# Patient Record
Sex: FEMALE | Race: WHITE | Employment: FULL TIME | URBAN - METROPOLITAN AREA
[De-identification: names, ages, dates, MRNs, and addresses within clinical notes are randomized per-mention and may not be internally consistent; named-entity substitution may affect disease eponyms.]

---

## 2017-01-17 ENCOUNTER — OFFICE VISIT (OUTPATIENT)
Dept: FAMILY MEDICINE CLINIC | Age: 52
End: 2017-01-17

## 2017-01-17 VITALS
BODY MASS INDEX: 34.36 KG/M2 | HEIGHT: 61 IN | DIASTOLIC BLOOD PRESSURE: 74 MMHG | OXYGEN SATURATION: 96 % | WEIGHT: 182 LBS | SYSTOLIC BLOOD PRESSURE: 121 MMHG | RESPIRATION RATE: 16 BRPM | TEMPERATURE: 96.9 F | HEART RATE: 72 BPM

## 2017-01-17 DIAGNOSIS — I10 ESSENTIAL HYPERTENSION: Primary | ICD-10-CM

## 2017-01-17 DIAGNOSIS — Z91.09 ENVIRONMENTAL ALLERGIES: ICD-10-CM

## 2017-01-17 DIAGNOSIS — R42 DIZZINESS: ICD-10-CM

## 2017-01-17 DIAGNOSIS — G89.29 CHRONIC MIDLINE LOW BACK PAIN WITHOUT SCIATICA: ICD-10-CM

## 2017-01-17 DIAGNOSIS — E55.9 HYPOVITAMINOSIS D: ICD-10-CM

## 2017-01-17 DIAGNOSIS — Z51.81 ENCOUNTER FOR MEDICATION MONITORING: ICD-10-CM

## 2017-01-17 DIAGNOSIS — G47.00 INSOMNIA, UNSPECIFIED TYPE: ICD-10-CM

## 2017-01-17 DIAGNOSIS — F32.A DEPRESSION, UNSPECIFIED DEPRESSION TYPE: ICD-10-CM

## 2017-01-17 DIAGNOSIS — M54.50 CHRONIC MIDLINE LOW BACK PAIN WITHOUT SCIATICA: ICD-10-CM

## 2017-01-17 RX ORDER — FAMOTIDINE 40 MG/1
TABLET, FILM COATED ORAL
Refills: 3 | COMMUNITY
Start: 2016-10-23 | End: 2017-01-17

## 2017-01-17 RX ORDER — PILOCARPINE HYDROCHLORIDE 5 MG/1
TABLET, FILM COATED ORAL
Refills: 2 | COMMUNITY
Start: 2016-11-04 | End: 2019-05-20

## 2017-01-17 RX ORDER — CELECOXIB 200 MG/1
CAPSULE ORAL
Refills: 4 | COMMUNITY
Start: 2016-11-04 | End: 2018-05-01 | Stop reason: SDUPTHER

## 2017-01-17 NOTE — MR AVS SNAPSHOT
Visit Information Date & Time Provider Department Dept. Phone Encounter #  
 1/17/2017  8:45 AM Marcheta Krabbe, MD [facility] [phone] [id number] Follow-up Instructions Return in about 3 months (around 4/17/2017). Upcoming Health Maintenance Date Due FOBT Q 1 YEAR AGE 50-75 6/12/2016 Pneumococcal 19-64 Highest Risk (2 of 3 - PCV13) 5/27/2017 PAP AKA CERVICAL CYTOLOGY 11/17/2018 DTaP/Tdap/Td series (2 - Td) 5/1/2023 Allergies as of 1/17/2017  Review Complete On: 1/17/2017 By: Tommie Madison LPN Severity Noted Reaction Type Reactions Other Food  10/18/2013    Other (comments) PT STATES OTHER FOOD ALLERGIES WITH VARYING REACTIONS:  PORK, LETTUCE, LIMA BEANS, BLACK PEPPER, TEA, MALT, WALNUTS. Beef Containing Products High 04/24/2012   Systemic Anaphylaxis Food Extracts High 04/10/2012    Anaphylaxis Beef, milk, peanuts Milk High 04/24/2012   Systemic Anaphylaxis Peanut High 04/24/2012   Systemic Anaphylaxis Alpha-d-galactosidase  11/13/2014    Diarrhea Azithromycin  07/19/2011   Side Effect Nausea and Vomiting Erythromycin  04/19/2010    Nausea and Vomiting Guaifenesin  06/22/2016    Nausea and Vomiting Morphine  10/25/2010    Nausea and Vomiting Projectile vomiting per patient Other Medication  10/03/2013    Other (comments) WHITE CELL STIMULATOR INJECTION (NEULASTIN?) CAUSED SEVERE BONE PAIN LASTING A COUPLE WEEKS. Current Immunizations  Reviewed on 1/17/2017 Name Date Influenza Vaccine 10/31/2016, 9/22/2015 Influenza Vaccine Split 10/20/2010 Tdap 5/1/2013 Reviewed by Tommie Madison LPN on 7/48/8126 at  9:52 AM  
 Reviewed by Marcheta Krabbe, MD on 1/17/2017 at 10:57 AM  
Vitals BP Pulse Temp Resp Height(growth percentile) Weight(growth percentile)  121/74 (BP 1 Location: Left arm, BP Patient Position: Sitting) 72 96.9 °F (36.1 °C) (Oral) 16 5' 1\" (1.549 m) 182 lb (82.6 kg) LMP SpO2 BMI OB Status Smoking Status 08/03/2012 96% 34.39 kg/m2 Hysterectomy Never Smoker Vitals History BMI and BSA Data Body Mass Index Body Surface Area  
 34.39 kg/m 2 1.89 m 2 Preferred Pharmacy Pharmacy Name Phone [id number]- [facility] [address] [phone] Your Updated Medication List  
  
   
This list is accurate as of: 1/17/17 11:10 AM.  Always use your most recent med list.  
  
  
  
  
 albuterol sulfate 90 mcg/actuation Aepb Commonly known as:  Margean Case Take 2 Puffs by inhalation every four (4) hours as needed. ALPRAZolam 0.25 mg tablet Commonly known as:  XANAX  
TAKE 1 OR 2 TABLETS THREE TIMES A DAY AS NEEDED  
  
 BENADRYL 25 mg capsule Generic drug:  diphenhydrAMINE Take 50 mg by mouth every six (6) hours as needed. Indications: ALLERGIC REACTIONS  
  
 celecoxib 200 mg capsule Commonly known as:  CELEBREX  
TAKE 1 CAPSULE TWICE A DAY AS NEEDED CLARITIN 10 mg tablet Generic drug:  loratadine Take 10 mg by mouth daily (after lunch). TAKES AS NEEDED  
  
 EPINEPHrine 0.3 mg/0.3 mL injection Commonly known as:  EPIPEN 2-JULIAN  
0.3 mL by IntraMUSCular route once as needed for up to 1 dose. esomeprazole 40 mg capsule Commonly known as:  NexIUM Take 1 Cap by mouth daily. gabapentin 300 mg capsule Commonly known as:  NEURONTIN  
TAKE ONE CAPSULE BY MOUTH EACH NIGHT HYDROcodone-acetaminophen 5-325 mg per tablet Commonly known as:  NORCO  
TAKE 1 TABLET EVERY 8 HOURS AS NEEDED FOR PAIN  
  
 metoprolol succinate 25 mg XL tablet Commonly known as:  TOPROL XL Take 0.5 Tabs by mouth daily. mometasone 50 mcg/actuation nasal spray Commonly known as:  NASONEX  
2 Sprays by Both Nostrils route as needed. pilocarpine 5 mg tablet Commonly known as:  Dickie Lee  
 Take 1 tab 2 times daily  
  
 promethazine-dextromethorphan 6.25-15 mg/5 mL syrup Commonly known as:  PROMETHAZINE-DM Take 5 mL by mouth every six (6) hours as needed for Cough. tamoxifen 20 mg tablet Commonly known as:  NOLVADEX Take 1 Tab by mouth nightly. traMADol 300 mg tablet Commonly known as:  ULTRAM-ER Take 1 Tab by mouth nightly. zolpidem CR 6.25 mg tablet Commonly known as:  AMBIEN CR  
TAKE 1 TABLET BY MOUTH IN THE EVENING AS NEEDED FOR SLEEP ZyrTEC 10 mg tablet Generic drug:  cetirizine Take 10 mg by mouth nightly. Follow-up Instructions Return in about 3 months (around 4/17/2017). Introducing [facility]! Dear Kay Allan: Thank you for requesting a [employer] account. Our records indicate that you already have an active [employer] account. You can access your account anytime at [email or web address] Did you know that you can access your hospital and ER discharge instructions at any time in [employer]? You can also review all of your test results from your hospital stay or ER visit. Additional Information If you have questions, please visit the Frequently Asked Questions section of the [employer] website at [email or web address] Remember, [employer] is NOT to be used for urgent needs. For medical emergencies, dial 911. Now available from your iPhone and Android! Please provide this summary of care documentation to your next provider. Your primary care clinician is listed as Sally Pollard. If you have any questions after today's visit, please call [phone].

## 2017-01-17 NOTE — PROGRESS NOTES
Pt here for follow up on hypertension. CMP 7/7/2016    Lipid 5/22/2015    Vit D 5/22/2015    A1C 5/21/2015    Pt has had a bilateral mastectomy 4/2012. Colonoscopy 9/14/2011 by Dr. Jack reagan in 10 years.

## 2017-01-17 NOTE — PROGRESS NOTES
HISTORY OF PRESENT ILLNESS  Asher Eckert is a 49 y.o. female. HPI   Follow up on chronic medical problems. C/o nasal congestion, headache and pressure in the face and cough for the past 2 weeks. Coughing up thick yellowish phlegm. No fever or chills noted. Has malaise. Throat is scratchy. Has post nasal drainage. No chest pain or SOB. No wheezing noted. Has trouble with swallowing over the past several weeks. Feels like food gets stuck in the throat. No nausea or abd pain. No vomiting noted. Cardiovascular Review:  The patient has hypertension. Diet and Lifestyle: generally follows a low fat low cholesterol diet, generally follows a low sodium diet, exercises sporadically  Home BP Monitoring: is not measured at home. Pertinent ROS: taking medications as instructed, no medication side effects noted, no TIA's, no chest pain on exertion, no dyspnea on exertion, no swelling of ankles. Anxiety Review:  Patient is seen for ADD, sleep disturbance and anxiety. Current treatment includes Xanax. She is currently not on the zoloft and vynase and tolerating being off of the medications. Ongoing symptoms include: insomnia. She is using [place] for sleep. Reported side effects from the treatment: none. Chronic Pain:  Patient has osteoarthritis and myalgias. She has been intolerant to NSAIDS and suffers with chronic pain. Symptoms onset: problem is longstanding. Rheumatological ROS: stable, mild-to-moderate joint symptoms intermittently, reasonably well controlled by PRN meds. Response to treatment plan: stable. {Choose one or more HPI Chronic Disease Notes, press DELETE if none desired:92012}  {Choose one or more SmartLinks; press DELETE if none desired:[id number]}   {Choose one or more Last Lab values; press DELETE if none desired:[id number]}     Review of Systems   Constitutional: Negative for malaise/fatigue. HENT: Negative for congestion. Eyes: Negative for blurred vision. Respiratory: Negative for cough and shortness of breath. Cardiovascular: Negative for chest pain, palpitations and leg swelling. Gastrointestinal: Negative for abdominal pain, constipation and heartburn. Genitourinary: Negative for dysuria, frequency and urgency. Musculoskeletal: Negative for back pain and joint pain. Neurological: Negative for dizziness, tingling and headaches. Endo/Heme/Allergies: Negative for environmental allergies. Psychiatric/Behavioral: Negative for depression. The patient does not have insomnia. Physical Exam   Constitutional: She appears well-developed and well-nourished. HENT:   Right Ear: Tympanic membrane and ear canal normal.   Left Ear: Tympanic membrane and ear canal normal.   Nose: No mucosal edema or rhinorrhea. Mouth/Throat: Oropharynx is clear and moist and mucous membranes are normal.   Neck: Normal range of motion. Neck supple. No thyromegaly present. Cardiovascular: Normal rate and regular rhythm. No murmur heard. Pulmonary/Chest: Effort normal and breath sounds normal.   Abdominal: Soft. Bowel sounds are normal. There is no tenderness. Musculoskeletal: Normal range of motion. She exhibits no edema. Lymphadenopathy:     She has no cervical adenopathy. Skin: Skin is warm and dry. Psychiatric: She has a normal mood and affect. Nursing note and vitals reviewed.       ASSESSMENT and PLAN  {ASSESSMENT/PLAN:81517}

## 2017-01-17 NOTE — PROGRESS NOTES
Pt was seen with student NP and I agree with note as outline. Assessment and plan discussed with the pt who understand her dx and treatment plan. I have discussed diagnosis listed in this note with pt and/or family. I have discussed treatment plans and options and the risk/benefit analysis of those options, including safe use of medications and possible medication side effects. Through the use of shared decision making we have agreed to the above plan. The patient has received an after-visit summary and questions were answered concerning future plans and follow up. Advise pt of any urgent changes then to proceed to the ER.

## 2017-01-17 NOTE — PROGRESS NOTES
HISTORY OF PRESENT ILLNESS  Corrinne Mitts is a 49 y.o. female. Here for HTN f/u. HPI   Follow up on chronic medical problems. Cardiovascular Review:  The patient has hypertension. Diet and Lifestyle: generally follows a low fat low cholesterol diet, generally follows a low sodium diet, exercises sporadically  Home BP Monitoring: is not measured at home. Taking medications as instructed, no medication side effects noted, no TIA's, no chest pain on exertion, no dyspnea on exertion, no swelling of ankles. Dizziness:   Pt has had dizziness in the past but it has been worse for the past 3 months which happens everyday. No associated weakness, or TIAs. Denies nasal congestion. Started pilocarpine and celebrex in November 2016. No CP, palpitations or SOB. Anxiety Review:  Patient is seen for ADD, sleep disturbance and anxiety. Current treatment includes Xanax. She is currently not on the zoloft and vynase and tolerating being off of the medications. Ongoing symptoms include: insomnia. She is using Trisha Lyme for sleep. Reported side effects from the treatment: none. She is looking for another job, current one is too stressful. 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 This has been discussed with her mental health specialist which she will see next Wednesday. Chronic Pain:  Patient has osteoarthritis and myalgias. She has been intolerant to NSAIDS and suffers with chronic pain. Symptoms onset: problem is longstanding. Rheumatological ROS: stable, mild-to-moderate joint symptoms intermittently, reasonably well controlled by PRN meds. Response to treatment plan: stable.      Patient Active Problem List   Diagnosis Code    Palpitations R00.2    Depression F32.9    Environmental allergies Z91.09    [facility] C[facility]    GERD (gastroesophageal reflux disease) K21.9    Insomnia G47.00    Essential hypertension I10    ADD (attention deficit disorder) F98.8    Encounter for medication monitoring Z51.81       Current Outpatient Prescriptions   Medication Sig Dispense Refill    celecoxib (CELEBREX) 200 mg capsule TAKE 1 CAPSULE TWICE A DAY AS NEEDED  4    pilocarpine (SALAGEN) 5 mg tablet Take 1 tab 2 times daily  2    zolpidem CR (AMBIEN CR) 6.25 mg tablet TAKE 1 TABLET BY MOUTH IN THE EVENING AS NEEDED FOR SLEEP 90 Tab 1    HYDROcodone-acetaminophen (NORCO) 5-325 mg per tablet TAKE 1 TABLET EVERY 8 HOURS AS NEEDED FOR PAIN 60 Tab 0    traMADol (ULTRAM-ER) 300 mg tablet Take 1 Tab by mouth nightly. 90 Tab 1    gabapentin (NEURONTIN) 300 mg capsule TAKE ONE CAPSULE BY MOUTH EACH NIGHT 90 Cap 0    ALPRAZolam (XANAX) 0.25 mg tablet TAKE 1 OR 2 TABLETS THREE TIMES A DAY AS NEEDED 90 Tab 1    metoprolol succinate (TOPROL XL) 25 mg XL tablet Take 0.5 Tabs by mouth daily. 45 Tab 3    promethazine-dextromethorphan (PROMETHAZINE-DM) 6.25-15 mg/5 mL syrup Take 5 mL by mouth every six (6) hours as needed for Cough. 180 mL 1    albuterol sulfate (PROAIR RESPICLICK) 90 mcg/actuation aepb Take 2 Puffs by inhalation every four (4) hours as needed. 1 Inhaler 0    esomeprazole (NEXIUM) 40 mg capsule Take 1 Cap by mouth daily. 30 Cap 3    tamoxifen (NOLVADEX) 20 mg tablet Take 1 Tab by mouth nightly. 90 Tab 3    mometasone (NASONEX) 50 mcg/actuation nasal spray 2 Sprays by Both Nostrils route as needed. 1 Container 11    EPINEPHrine (EPIPEN 2-JULIAN) 0.3 mg/0.3 mL (1:1,000) injection 0.3 mL by IntraMUSCular route once as needed for up to 1 dose. 0.6 mL 1    diphenhydrAMINE (BENADRYL) 25 mg capsule Take 50 mg by mouth every six (6) hours as needed. Indications: ALLERGIC REACTIONS      loratadine (CLARITIN) 10 mg tablet Take 10 mg by mouth daily (after lunch). TAKES AS NEEDED      cetirizine (ZYRTEC) 10 mg tablet Take 10 mg by mouth nightly.          Allergies   Allergen Reactions    Other Food Other (comments) PT STATES OTHER FOOD ALLERGIES WITH VARYING REACTIONS:  PORK, LETTUCE, LIMA BEANS, BLACK PEPPER, TEA, MALT, WALNUTS.  Beef Containing Products Anaphylaxis    Food Extracts Anaphylaxis     Beef, milk, peanuts    Milk Anaphylaxis    Peanut Anaphylaxis    Alpha-D-Galactosidase Diarrhea    Azithromycin Nausea and Vomiting    Erythromycin Nausea and Vomiting    Guaifenesin Nausea and Vomiting    Morphine Nausea and Vomiting     Projectile vomiting per patient    Other Medication Other (comments)     WHITE CELL STIMULATOR INJECTION (NEULASTIN?) CAUSED SEVERE BONE PAIN LASTING A COUPLE WEEKS. Past Medical History   Diagnosis Date    Anemia 10/3/13     \"SINCE I WAS A KID\"    Anxiety      DURING CHEMO; PT STAYS XANAX AS OF 10/3/13    [facility] [id number]     breast,chemo ended 10/2012    Chronic pain      si joints sacrum    Depression     Nausea & vomiting      used patch in past with good results    Other ill-defined conditions(799.89)      anemia,chronic diarrhea, TMJ    Ovarian cyst     Palpitations      TAKES METOPROLOL FOR TACHYCARDIA    PUD (peptic ulcer disease)      occurred at age 33 .     Unspecified adverse effect of anesthesia      TMJ       Past Surgical History   Procedure Laterality Date    Pr laminotomy  2/11/2010    Hx gi       anal fissure repair    Pr colonoscopy flx dx w/collj spec when pfrmd  09/14/2011     Dr Malcom Alcantara Pr breast surgery procedure unlisted       breast biopsy x3 left breast    Hx mastectomy  4/24/2012     double    Hx breast reconstruction  7/10/2013     BREAST NIPPLE RECONSTRUCTION REVISION performed by Pati Luna MD at [address] [place] [address] Hx breast reconstruction  5/22/2012     REINCISION LEFT BREAST FOR REMAINDER OF BREAST CA    Hx breast reconstruction  5/22/2012     R SEROMA EXCISED, REPLACE IMPLANT R BREAST    Hx vascular access  7/2/2012     PORTACATH INSERTION    Hx vascular access  11/2012     PORTCATH REMOVAL    Hx other surgical DENTAL SURGERIES WITH SEDATION    Hx  section  1984    Hx gyn  10/21/13     [facility]/BSO    Hx hysterectomy  10/2013     Dr. Jeffrey Warner       Family History   Problem Relation Age of Onset    Breast Cancer Mother 64    Cancer Mother 64      OF LUNG CA    Hypertension Father     Diabetes Father     Emphysema Father     COPD Father     Heart Attack Sister 50    Heart Disease Sister     Breast Cancer Sister     No Known Problems Brother     Anxiety Brother     No Known Problems Sister     Breast Cancer Paternal Grandmother     Malignant Hyperthermia Neg Hx     Pseudocholinesterase Deficiency Neg Hx     Delayed Awakening Neg Hx     Post-op Nausea/Vomiting Neg Hx     Emergence Delirium Neg Hx     Post-op Cognitive Dysfunction Neg Hx     Other Neg Hx        Social History   Substance Use Topics    Smoking status: Never Smoker    Smokeless tobacco: Never Used    Alcohol use No     Labs done external thru [facility]      Review of Systems   Constitutional: Negative for chills and fever. Eyes: Negative for blurred vision and double vision. Respiratory: Negative for cough. Cardiovascular: Negative for chest pain, palpitations and leg swelling. Gastrointestinal: Negative for abdominal pain, blood in stool and heartburn. Genitourinary: Negative for dysuria and hematuria. Musculoskeletal: Positive for back pain and myalgias. Chronic back pain   Neurological: Positive for dizziness. Negative for headaches. Psychiatric/Behavioral: The patient does not have insomnia. Physical Exam   Constitutional: She is oriented to person, place, and time. She appears well-developed and well-nourished. No distress. HENT:   Head: Normocephalic and atraumatic. Right Ear: External ear normal.   Left Ear: External ear normal.   Nose: Nose normal.   Mouth/Throat: Oropharynx is clear and moist.   Eyes: Pupils are equal, round, and reactive to light. Neck: Neck supple.  No tracheal deviation present. No thyromegaly present. Cardiovascular: Normal rate, regular rhythm, normal heart sounds and intact distal pulses. No murmur heard. Pulmonary/Chest: Effort normal and breath sounds normal. No respiratory distress. She has no wheezes. Abdominal: Soft. Bowel sounds are normal. She exhibits no distension. There is no tenderness. Musculoskeletal: She exhibits no edema. Lymphadenopathy:     She has no cervical adenopathy. Neurological: She is alert and oriented to person, place, and time. Skin: Skin is warm and dry. She is not diaphoretic. No erythema. Psychiatric: She has a normal mood and affect. Vitals reviewed. Visit Vitals    /74 (BP 1 Location: Left arm, BP Patient Position: Sitting)    Pulse 72    Temp 96.9 °F (36.1 °C) (Oral)    Resp 16    Ht 5' 1\" (1.549 m)    Wt 182 lb (82.6 kg)    LMP 08/03/2012    SpO2 96%    BMI 34.39 kg/m2       Current Outpatient Prescriptions:     celecoxib (CELEBREX) 200 mg capsule, TAKE 1 CAPSULE TWICE A DAY AS NEEDED, Disp: , Rfl: 4    pilocarpine (SALAGEN) 5 mg tablet, Take 1 tab 2 times daily, Disp: , Rfl: 2    zolpidem CR (AMBIEN CR) 6.25 mg tablet, TAKE 1 TABLET BY MOUTH IN THE EVENING AS NEEDED FOR SLEEP, Disp: 90 Tab, Rfl: 1    HYDROcodone-acetaminophen (NORCO) 5-325 mg per tablet, TAKE 1 TABLET EVERY 8 HOURS AS NEEDED FOR PAIN, Disp: 60 Tab, Rfl: 0    traMADol (ULTRAM-ER) 300 mg tablet, Take 1 Tab by mouth nightly., Disp: 90 Tab, Rfl: 1    gabapentin (NEURONTIN) 300 mg capsule, TAKE ONE CAPSULE BY MOUTH EACH NIGHT, Disp: 90 Cap, Rfl: 0    ALPRAZolam (XANAX) 0.25 mg tablet, TAKE 1 OR 2 TABLETS THREE TIMES A DAY AS NEEDED, Disp: 90 Tab, Rfl: 1    metoprolol succinate (TOPROL XL) 25 mg XL tablet, Take 0.5 Tabs by mouth daily. , Disp: 45 Tab, Rfl: 3    promethazine-dextromethorphan (PROMETHAZINE-DM) 6.25-15 mg/5 mL syrup, Take 5 mL by mouth every six (6) hours as needed for Cough. , Disp: 180 mL, Rfl: 1    albuterol sulfate (PROAIR RESPICLICK) 90 mcg/actuation aepb, Take 2 Puffs by inhalation every four (4) hours as needed. , Disp: 1 Inhaler, Rfl: 0    esomeprazole (NEXIUM) 40 mg capsule, Take 1 Cap by mouth daily. , Disp: 30 Cap, Rfl: 3    tamoxifen (NOLVADEX) 20 mg tablet, Take 1 Tab by mouth nightly., Disp: 90 Tab, Rfl: 3    mometasone (NASONEX) 50 mcg/actuation nasal spray, 2 Sprays by Both Nostrils route as needed. , Disp: 1 Container, Rfl: 11    EPINEPHrine (EPIPEN 2-JULIAN) 0.3 mg/0.3 mL (1:1,000) injection, 0.3 mL by IntraMUSCular route once as needed for up to 1 dose., Disp: 0.6 mL, Rfl: 1    diphenhydrAMINE (BENADRYL) 25 mg capsule, Take 50 mg by mouth every six (6) hours as needed. Indications: ALLERGIC REACTIONS, Disp: , Rfl:     loratadine (CLARITIN) 10 mg tablet, Take 10 mg by mouth daily (after lunch). TAKES AS NEEDED, Disp: , Rfl:     cetirizine (ZYRTEC) 10 mg tablet, Take 10 mg by mouth nightly., Disp: , Rfl:       ASSESSMENT and Sheila Slider was seen today for hypertension. Diagnoses and all orders for this visit:    Essential hypertension  Stable. Pt is compliant with medications and diet. Dizziness  -     CBC W/O DIFF; Future  -     METABOLIC PANEL, COMPREHENSIVE; Future  -     MRI BRAIN WO CONT; Future  Will hold medications to seen if this will help. Hold Ambien, xanax and tramadol. Hypovitaminosis D  -     VITAMIN D, 25 HYDROXY; Future    Insomnia, unspecified type  Stable    Depression, unspecified depression type  Stable    Chronic midline low back pain without sciatica  RA doctor prescribed celebrex in November 2016 and patient is responding well. Environmental allergies  Stable    Encounter for medication monitoring  Discussed medication side-effects and compliance. Follow-up Disposition:  Return in about 3 months (around 4/17/2017).   lab results and schedule of future lab studies reviewed with patient  reviewed diet, exercise and weight control  cardiovascular risk and specific lipid/LDL goals reviewed  reviewed medications and side effects in detail  radiology results and schedule of future radiology studies reviewed with patient    Pt was seen with student NP and I agree with note as outline. Assessment and plan discussed with the pt who understand her dx and treatment plan. I have discussed diagnosis listed in this note with pt and/or family. I have discussed treatment plans and options and the risk/benefit analysis of those options, including safe use of medications and possible medication side effects. Through the use of shared decision making we have agreed to the above plan. The patient has received an after-visit summary and questions were answered concerning future plans and follow up. Advise pt of any urgent changes then to proceed to the ER.

## 2017-01-23 ENCOUNTER — TELEPHONE (OUTPATIENT)
Dept: FAMILY MEDICINE CLINIC | Age: 52
End: 2017-01-23

## 2017-01-23 DIAGNOSIS — Z13.820 SCREENING FOR OSTEOPOROSIS: Primary | ICD-10-CM

## 2017-01-23 DIAGNOSIS — Z90.710 STATUS POST HYSTERECTOMY: ICD-10-CM

## 2017-01-23 NOTE — TELEPHONE ENCOUNTER
----- Message from Naomi Mota MD sent at 1/17/2017  6:15 PM EST -----  Please schedule her MRI of the head at an [facility] facility

## 2017-01-25 NOTE — TELEPHONE ENCOUNTER
(late entry) Appointment scheduled at [facility] on [address], Friday, January 27, 2017 at 7:45 am for MRI brain w/o contrast.     Patient is going to reschedule this appointment and asked that we place an order for a bone density scan as well. Order placed. Both orders faxed to [facility] central scheduling.

## 2017-02-27 DIAGNOSIS — K21.9 GASTROESOPHAGEAL REFLUX DISEASE WITHOUT ESOPHAGITIS: ICD-10-CM

## 2017-02-27 RX ORDER — ESOMEPRAZOLE MAGNESIUM 40 MG/1
40 CAPSULE, DELAYED RELEASE ORAL DAILY
Qty: 30 CAP | Refills: 11 | Status: SHIPPED | OUTPATIENT
Start: 2017-02-27 | End: 2018-03-23 | Stop reason: SDUPTHER

## 2017-03-01 DIAGNOSIS — F32.A DEPRESSION, UNSPECIFIED DEPRESSION TYPE: ICD-10-CM

## 2017-03-01 RX ORDER — ALPRAZOLAM 0.25 MG/1
TABLET ORAL
Qty: 90 TAB | Refills: 1 | OUTPATIENT
Start: 2017-03-01 | End: 2017-07-05 | Stop reason: SDUPTHER

## 2017-03-24 ENCOUNTER — NURSE NAVIGATOR (OUTPATIENT)
Dept: FAMILY MEDICINE CLINIC | Age: 52
End: 2017-03-24

## 2017-06-05 DIAGNOSIS — G89.4 CHRONIC PAIN SYNDROME: ICD-10-CM

## 2017-06-08 RX ORDER — TAMOXIFEN CITRATE 20 MG/1
20 TABLET ORAL
Qty: 90 TAB | Refills: 3 | OUTPATIENT
Start: 2017-06-08

## 2017-06-08 RX ORDER — HYDROCODONE BITARTRATE AND ACETAMINOPHEN 5; 325 MG/1; MG/1
TABLET ORAL
Qty: 60 TAB | Refills: 0 | OUTPATIENT
Start: 2017-06-08

## 2017-06-08 NOTE — TELEPHONE ENCOUNTER
From: Tash Cortez  To: Karie Nathan MD  Sent: 6/5/2017 2:28 PM EDT  Subject: Medication Renewal Request    Original authorizing provider: MD Tash Gonsalez would like a refill of the following medications:  tamoxifen (NOLVADEX) 20 mg tablet Karie Nathan MD]  HYDROcodone-acetaminophen (NORCO) 5-325 mg per tablet Karie Nathan MD]    Preferred pharmacy: [facility]/PHARMACY [address] [facility], [address] [place] [address] ROAD    Comment:

## 2017-06-09 ENCOUNTER — OFFICE VISIT (OUTPATIENT)
Dept: FAMILY MEDICINE CLINIC | Age: 52
End: 2017-06-09

## 2017-06-09 VITALS
HEART RATE: 74 BPM | BODY MASS INDEX: 35.87 KG/M2 | RESPIRATION RATE: 16 BRPM | DIASTOLIC BLOOD PRESSURE: 81 MMHG | SYSTOLIC BLOOD PRESSURE: 136 MMHG | HEIGHT: 61 IN | OXYGEN SATURATION: 97 % | TEMPERATURE: 97.2 F | WEIGHT: 190 LBS

## 2017-06-09 DIAGNOSIS — M51.36 DDD (DEGENERATIVE DISC DISEASE), LUMBAR: ICD-10-CM

## 2017-06-09 DIAGNOSIS — Z01.89 ENCOUNTER FOR ROUTINE LABORATORY TESTING: ICD-10-CM

## 2017-06-09 DIAGNOSIS — G89.29 ENCOUNTER FOR CHRONIC PAIN MANAGEMENT: ICD-10-CM

## 2017-06-09 DIAGNOSIS — G89.4 CHRONIC PAIN SYNDROME: Primary | ICD-10-CM

## 2017-06-09 RX ORDER — HYDROCODONE BITARTRATE AND ACETAMINOPHEN 5; 325 MG/1; MG/1
TABLET ORAL
Qty: 60 TAB | Refills: 0 | Status: CANCELLED | OUTPATIENT
Start: 2017-06-09

## 2017-06-09 RX ORDER — HYDROCODONE BITARTRATE AND ACETAMINOPHEN 5; 325 MG/1; MG/1
TABLET ORAL
Qty: 60 TAB | Refills: 0 | Status: SHIPPED | OUTPATIENT
Start: 2017-06-09 | End: 2018-01-03 | Stop reason: SDUPTHER

## 2017-06-09 RX ORDER — ZOLPIDEM TARTRATE 6.25 MG/1
TABLET, FILM COATED, EXTENDED RELEASE ORAL
Qty: 90 TAB | Refills: 1 | Status: SHIPPED | OUTPATIENT
Start: 2017-06-09 | End: 2017-07-05 | Stop reason: SDUPTHER

## 2017-06-09 NOTE — PROGRESS NOTES
HISTORY OF PRESENT ILLNESS  Urvashi Israel is a 49 y.o. female. HPI   Encounter for pain management. 1./2. Medical history/Past medical History  Chronic Pain:  Patient has osteoarthritis in the lower back and suffer with chronic pain in her lower back. Has been taking celebrex twice a day as long as she does not have GI upset. She uses the hydrocodone for more severe pain. Pain ranges from 6-9/10. MRI lumbar spine in 2009 showed DDD then with bulging disc and central radial teat in L4-5. She had laminectomy in 2010 for one disc. She has had multiple steroid epidural injections. Symptoms onset: problem is longstanding. Rheumatological ROS: stable, mild-to-moderate joint symptoms intermittently, reasonably well controlled by PRN meds. Response to treatment plan: stable. 3. Applicable records from prior treatment providers are apart of [facility] under the media tab. 4. Diagnostic, therapeutic and laboratory results are available in the [address] [place] [address] chart. 5. Consultation notes are available for review in the media tab of the [address] [place] [address] chart. 6. Treatment goals include pain control so that the pt may be as active and function with her daily activities and improved comfort level. Previously pt has been limited by pain. 7. The risks and benefits of treatment has been discussed at this office visit with the pt. She understands that the medication has addicting potential.  Additionally the pt has been advised that narcotic pain medication may impair mental and/or physical ability required for performance of tasks such as driving or operating any other machinery. 8. Pt has an updated signed pain contract on file and can be found under the FYI section of the [facility] chart. 9. The pain contract is reviewed. Pain medication will be continued at the previous dosage. Urine drug screening will be done today. Diagnostic studies are not indicated at this time.   Interventional procedure are not indicated at this time. 10. Medication prescibed is HYDROcodone-acetaminophen (NORCO) 5-325 mg per tablet .  has been reviewed at this OV by me. 11. Patient instructions have been reviewed in detail as outlined above and in the pain contract. 12. Re-eval is planned for 3 months. Patient Active Problem List   Diagnosis Code    Palpitations R00.2    Depression F32.9    Environmental allergies Z91.09    Breast cancer, stage 1 ([facility] [address] C50.919    GERD (gastroesophageal reflux disease) K21.9    Insomnia G47.00    Essential hypertension I10    ADD (attention deficit disorder) F98.8    Encounter for medication monitoring Z51.81       Current Outpatient Prescriptions   Medication Sig Dispense Refill    ALPRAZolam (XANAX) 0.25 mg tablet TAKE 1 OR 2 TABLETS THREE TIMES A DAY AS NEEDED 90 Tab 1    esomeprazole (NEXIUM) 40 mg capsule Take 1 Cap by mouth daily. 30 Cap 11    celecoxib (CELEBREX) 200 mg capsule TAKE 1 CAPSULE TWICE A DAY AS NEEDED  4    pilocarpine (SALAGEN) 5 mg tablet Take 1 tab 2 times daily  2    zolpidem CR (AMBIEN CR) 6.25 mg tablet TAKE 1 TABLET BY MOUTH IN THE EVENING AS NEEDED FOR SLEEP 90 Tab 1    HYDROcodone-acetaminophen (NORCO) 5-325 mg per tablet TAKE 1 TABLET EVERY 8 HOURS AS NEEDED FOR PAIN 60 Tab 0    traMADol (ULTRAM-ER) 300 mg tablet Take 1 Tab by mouth nightly. 90 Tab 1    gabapentin (NEURONTIN) 300 mg capsule TAKE ONE CAPSULE BY MOUTH EACH NIGHT 90 Cap 0    metoprolol succinate (TOPROL XL) 25 mg XL tablet Take 0.5 Tabs by mouth daily. 45 Tab 3    promethazine-dextromethorphan (PROMETHAZINE-DM) 6.25-15 mg/5 mL syrup Take 5 mL by mouth every six (6) hours as needed for Cough. 180 mL 1    albuterol sulfate (PROAIR RESPICLICK) 90 mcg/actuation aepb Take 2 Puffs by inhalation every four (4) hours as needed. 1 Inhaler 0    tamoxifen (NOLVADEX) 20 mg tablet Take 1 Tab by mouth nightly.  90 Tab 3    mometasone (NASONEX) 50 mcg/actuation nasal spray 2 Sprays by Both Nostrils route as needed. 1 Container 11    EPINEPHrine (EPIPEN 2-JULIAN) 0.3 mg/0.3 mL (1:1,000) injection 0.3 mL by IntraMUSCular route once as needed for up to 1 dose. 0.6 mL 1    diphenhydrAMINE (BENADRYL) 25 mg capsule Take 50 mg by mouth every six (6) hours as needed. Indications: ALLERGIC REACTIONS      loratadine (CLARITIN) 10 mg tablet Take 10 mg by mouth daily (after lunch). TAKES AS NEEDED      cetirizine (ZYRTEC) 10 mg tablet Take 10 mg by mouth nightly. Allergies   Allergen Reactions    Other Food Other (comments)     PT STATES OTHER FOOD ALLERGIES WITH VARYING REACTIONS:  PORK, LETTUCE, LIMA BEANS, BLACK PEPPER, TEA, MALT, WALNUTS.  Beef Containing Products Anaphylaxis    Food Extracts Anaphylaxis     Beef, milk, peanuts    Milk Anaphylaxis    Peanut Anaphylaxis    Alpha-D-Galactosidase Diarrhea    Azithromycin Nausea and Vomiting    Erythromycin Nausea and Vomiting    Guaifenesin Nausea and Vomiting    Morphine Nausea and Vomiting     Projectile vomiting per patient    Other Medication Other (comments)     WHITE CELL STIMULATOR INJECTION (NEULASTIN?) CAUSED SEVERE BONE PAIN LASTING A COUPLE WEEKS. Past Medical History:   Diagnosis Date    Anemia 10/3/13    \"SINCE I WAS A KID\"    Anxiety     DURING CHEMO; PT STAYS XANAX AS OF 10/3/13    Cancer ([facility] [id number]    breast,chemo ended 10/2012    Chronic pain     si joints sacrum    Depression     Nausea & vomiting     used patch in past with good results    Other ill-defined conditions     anemia,chronic diarrhea, TMJ    Ovarian cyst     Palpitations     TAKES METOPROLOL FOR TACHYCARDIA    PUD (peptic ulcer disease)     occurred at age 33 .     Unspecified adverse effect of anesthesia     TMJ       Past Surgical History:   Procedure Laterality Date    BREAST SURGERY PROCEDURE UNLISTED      breast biopsy x3 left breast    HX BREAST RECONSTRUCTION  7/10/2013    BREAST NIPPLE RECONSTRUCTION REVISION performed by Priyank Alexander MD at [facility] MA[facility] OR    HX BREAST RECONSTRUCTION  2012    REINCISION LEFT BREAST FOR REMAINDER OF BREAST CA    HX BREAST RECONSTRUCTION  2012    R SEROMA EXCISED, REPLACE IMPLANT R BREAST    HX  SECTION  1984    HX GI      anal fissure repair    HX GYN  10/21/13    TL[facility]/BSO    HX HYSTERECTOMY  10/2013    Dr. Yemi Reese    HX MASTECTOMY  2012    double    HX OTHER SURGICAL      DENTAL SURGERIES WITH SEDATION    HX VASCULAR ACCESS  2012    PORTACATH INSERTION    HX VASCULAR ACCESS  2012    PORTCATH REMOVAL    LAMINOTOMY  2010    [place] COLONOSCOPY FLX DX W/COLLJ SPEC WHEN PFRMD  2011    Dr Stefan Lagunas       Family History   Problem Relation Age of Onset    Breast Cancer Mother 64    Cancer Mother 64      OF LUNG CA    Hypertension Father     Diabetes Father     Emphysema Father     COPD Father     Heart Attack Sister 50    Heart Disease Sister     Breast Cancer Sister     No Known Problems Brother     Anxiety Brother     No Known Problems Sister     Breast Cancer Paternal Grandmother     Malignant Hyperthermia Neg Hx     Pseudocholinesterase Deficiency Neg Hx     Delayed Awakening Neg Hx     Post-op Nausea/Vomiting Neg Hx     Emergence Delirium Neg Hx     Post-op Cognitive Dysfunction Neg Hx     Other Neg Hx        Social History   Substance Use Topics    Smoking status: Never Smoker    Smokeless tobacco: Never Used    Alcohol use No        Review of Systems   Constitutional: Negative for malaise/fatigue. HENT: Negative for congestion. Eyes: Negative for blurred vision. Respiratory: Negative for cough and shortness of breath. Cardiovascular: Negative for chest pain, palpitations and leg swelling. Gastrointestinal: Negative for abdominal pain, constipation and heartburn. Genitourinary: Negative for dysuria, frequency and urgency. Musculoskeletal: Positive for back pain. Negative for joint pain. Neurological: Negative for dizziness, tingling and headaches. Endo/Heme/Allergies: Negative for environmental allergies. Psychiatric/Behavioral: Negative for depression. The patient does not have insomnia. Physical Exam   Constitutional: She appears well-developed and well-nourished. /81 (BP 1 Location: Left arm, BP Patient Position: Sitting)  Pulse 74  Temp 97.2 °F (36.2 °C) (Oral)   Resp 16  Ht 5' 1\" (1.549 m)  Wt 190 lb (86.2 kg)  LMP 08/03/2012  SpO2 97%  BMI 35.9 kg/m2     HENT:   Right Ear: Tympanic membrane and ear canal normal.   Left Ear: Tympanic membrane and ear canal normal.   Nose: No mucosal edema or rhinorrhea. Mouth/Throat: Oropharynx is clear and moist and mucous membranes are normal.   Neck: Normal range of motion. Neck supple. No thyromegaly present. Cardiovascular: Normal rate and regular rhythm. No murmur heard. Pulmonary/Chest: Effort normal and breath sounds normal.   Abdominal: Soft. Bowel sounds are normal. There is no tenderness. Musculoskeletal: Normal range of motion. She exhibits no edema. Lumbar back: She exhibits tenderness and bony tenderness. She exhibits normal range of motion. Lymphadenopathy:     She has no cervical adenopathy. Skin: Skin is warm and dry. Psychiatric: She has a normal mood and affect. Nursing note and vitals reviewed. ASSESSMENT and PLAN  Jackie Bassett was seen today for medication management. Diagnoses and all orders for this visit:    Chronic pain syndrome//  DDD (degenerative disc disease), lumbar  -     HYDROcodone-acetaminophen (NORCO) 5-325 mg per tablet; TAKE 1 TABLET EVERY 8 HOURS AS NEEDED FOR PAIN    Encounter for chronic pain management  -     9-DRUG SCREEN + OXY, UR  The pt has signed medication agreement. Pain contract is reviewed. Pain medications will be continued at the previous dosage. Urine drug screening will be done today. Diagnostic  studies are not indicated at this time. Interventional procedure are not indicated at this time. Re-eval in 3 months. Encounter for routine laboratory testing  -     CBC W/O DIFF; Future  -     LIPID PANEL; Future  -     METABOLIC PANEL, COMPREHENSIVE; Future  -     TSH 3RD GENERATION; Future  -     HEMOGLOBIN A1C WITH EAG; Future    Other orders  -     zolpidem CR (AMBIEN CR) 6.25 mg tablet; TAKE 1 TABLET BY MOUTH IN THE EVENING AS NEEDED FOR SLEEP      Follow-up Disposition:  Return in about 4 months (around 10/18/2017). I have discussed diagnosis listed in this note with pt and/or family. I have discussed treatment plans and options and the risk/benefit analysis of those options, including safe use of medications and possible medication side effects. Through the use of shared decision making we have agreed to the above plan. The patient has received an after-visit summary and questions were answered concerning future plans and follow up. Advise pt of any urgent changes then to proceed to the ER.

## 2017-06-10 LAB
AMPHETAMINES UR QL SCN: NEGATIVE NG/ML
BARBITURATES UR QL SCN: NEGATIVE NG/ML
BENZODIAZ UR QL SCN: POSITIVE NG/ML
BZE UR QL SCN: NEGATIVE NG/ML
CANNABINOIDS UR QL SCN: NEGATIVE NG/ML
CREAT UR-MCNC: 99.8 MG/DL (ref 20–300)
METHADONE UR QL SCN: NEGATIVE NG/ML
OPIATES UR QL SCN: NEGATIVE NG/ML
OXYCODONE+OXYMORPHONE UR QL SCN: NEGATIVE NG/ML
PCP UR QL SCN: NEGATIVE NG/ML
PH UR: 5.7 [PH] (ref 4.5–8.9)
PLEASE NOTE:, 733163: NORMAL
PROPOXYPH UR QL SCN: NEGATIVE NG/ML

## 2017-07-05 DIAGNOSIS — F32.A DEPRESSION, UNSPECIFIED DEPRESSION TYPE: ICD-10-CM

## 2017-07-05 RX ORDER — ZOLPIDEM TARTRATE 6.25 MG/1
TABLET, FILM COATED, EXTENDED RELEASE ORAL
Qty: 90 TAB | Refills: 0 | OUTPATIENT
Start: 2017-07-05 | End: 2017-10-04 | Stop reason: SDUPTHER

## 2017-07-05 RX ORDER — ALPRAZOLAM 0.25 MG/1
TABLET ORAL
Qty: 90 TAB | Refills: 0 | OUTPATIENT
Start: 2017-07-05 | End: 2017-08-23 | Stop reason: SDUPTHER

## 2017-07-05 NOTE — TELEPHONE ENCOUNTER
From: Carla Link  To: Liudmila Brewer MD  Sent: 7/5/2017 1:26 AM EDT  Subject: Medication Renewal Request    Original authorizing provider: MD Alfreda Mena would like a refill of the following medications:  ALPRAZolam (XANAX) 0.25 mg tablet [Trupti Kirk MD]  zolpidem CR (AMBIEN CR) 6.25 mg tablet Liudmila Brewer MD]    Preferred pharmacy: [facility], [address] [place] [address] ROAD    Comment:

## 2017-07-12 ENCOUNTER — TELEPHONE (OUTPATIENT)
Dept: FAMILY MEDICINE CLINIC | Age: 52
End: 2017-07-12

## 2017-09-01 DIAGNOSIS — J40 BRONCHITIS: ICD-10-CM

## 2017-09-01 RX ORDER — SULFAMETHOXAZOLE AND TRIMETHOPRIM 800; 160 MG/1; MG/1
1 TABLET ORAL 2 TIMES DAILY
Qty: 20 TAB | Refills: 0 | Status: SHIPPED | OUTPATIENT
Start: 2017-09-01 | End: 2017-09-11

## 2017-09-01 RX ORDER — PROMETHAZINE HYDROCHLORIDE AND DEXTROMETHORPHAN HYDROBROMIDE 6.25; 15 MG/5ML; MG/5ML
5 SYRUP ORAL
Qty: 180 ML | Refills: 1 | Status: SHIPPED | OUTPATIENT
Start: 2017-09-01 | End: 2018-01-03

## 2017-10-02 RX ORDER — METOPROLOL SUCCINATE 25 MG/1
12.5 TABLET, EXTENDED RELEASE ORAL DAILY
Qty: 45 TAB | Refills: 1 | Status: SHIPPED | OUTPATIENT
Start: 2017-10-02 | End: 2018-04-19 | Stop reason: SDUPTHER

## 2017-10-05 ENCOUNTER — DOCUMENTATION ONLY (OUTPATIENT)
Dept: FAMILY MEDICINE CLINIC | Age: 52
End: 2017-10-05

## 2017-10-05 RX ORDER — ZOLPIDEM TARTRATE 6.25 MG/1
TABLET, FILM COATED, EXTENDED RELEASE ORAL
Qty: 90 TAB | Refills: 0 | OUTPATIENT
Start: 2017-10-05

## 2017-10-05 RX ORDER — ZOLPIDEM TARTRATE 6.25 MG/1
TABLET, FILM COATED, EXTENDED RELEASE ORAL
Qty: 90 TAB | Refills: 0 | OUTPATIENT
Start: 2017-10-05 | End: 2017-10-06 | Stop reason: SDUPTHER

## 2017-10-05 NOTE — TELEPHONE ENCOUNTER
From: Venu Puga  To: Brent Rucker MD  Sent: 10/4/2017 11:43 PM EDT  Subject: Medication Renewal Request    Original authorizing provider: MD Mike Mitchell would like a refill of the following medications:  zolpidem CR (AMBIEN CR) 6.25 mg tablet Brent Rucker MD]    Preferred pharmacy: [facility], [address] [facility] [address]    Comment:  I will be out of Medicine tomorrow please fill as soon as possible. Thank you.

## 2017-10-06 RX ORDER — ZOLPIDEM TARTRATE 6.25 MG/1
TABLET, FILM COATED, EXTENDED RELEASE ORAL
Qty: 90 TAB | Refills: 1 | OUTPATIENT
Start: 2017-10-06 | End: 2018-01-02 | Stop reason: SDUPTHER

## 2017-12-04 RX ORDER — GABAPENTIN 300 MG/1
CAPSULE ORAL
Qty: 90 CAP | Refills: 0 | Status: SHIPPED | OUTPATIENT
Start: 2017-12-04 | End: 2018-03-14 | Stop reason: SDUPTHER

## 2017-12-04 NOTE — TELEPHONE ENCOUNTER
Requested Prescriptions     Pending Prescriptions Disp Refills    gabapentin (NEURONTIN) 300 mg capsule 90 Cap 0     Sig: TAKE ONE CAPSULE BY MOUTH EACH NIGHT     LOV:6/9/17  NOV: not schedule

## 2017-12-06 DIAGNOSIS — F32.A DEPRESSION, UNSPECIFIED DEPRESSION TYPE: ICD-10-CM

## 2017-12-06 RX ORDER — ALPRAZOLAM 0.25 MG/1
TABLET ORAL
Qty: 90 TAB | Refills: 1 | OUTPATIENT
Start: 2017-12-06 | End: 2018-04-13

## 2017-12-06 NOTE — TELEPHONE ENCOUNTER
Requested Prescriptions     Pending Prescriptions Disp Refills    ALPRAZolam (XANAX) 0.25 mg tablet 90 Tab 1     Sig: TAKE 1 OR 2 TABLETS THREE TIMES A DAY AS NEEDED     LOV:6/9/17  NOV: Not schedule

## 2017-12-21 RX ORDER — TAMOXIFEN CITRATE 20 MG/1
20 TABLET ORAL
Qty: 90 TAB | Refills: 3 | OUTPATIENT
Start: 2017-12-21

## 2018-01-02 DIAGNOSIS — Z91.09 ENVIRONMENTAL ALLERGIES: ICD-10-CM

## 2018-01-02 RX ORDER — MOMETASONE FUROATE 50 UG/1
2 SPRAY, METERED NASAL AS NEEDED
Qty: 1 CONTAINER | Refills: 11 | Status: SHIPPED | OUTPATIENT
Start: 2018-01-02 | End: 2018-01-03 | Stop reason: SDUPTHER

## 2018-01-02 NOTE — TELEPHONE ENCOUNTER
Requested Prescriptions     Pending Prescriptions Disp Refills    mometasone (NASONEX) 50 mcg/actuation nasal spray 1 Container 11     Si Sprays by Both Nostrils route as needed.

## 2018-01-03 ENCOUNTER — OFFICE VISIT (OUTPATIENT)
Dept: FAMILY MEDICINE CLINIC | Age: 53
End: 2018-01-03

## 2018-01-03 VITALS
SYSTOLIC BLOOD PRESSURE: 116 MMHG | TEMPERATURE: 98.4 F | HEIGHT: 61 IN | RESPIRATION RATE: 18 BRPM | WEIGHT: 194.4 LBS | HEART RATE: 88 BPM | BODY MASS INDEX: 36.7 KG/M2 | DIASTOLIC BLOOD PRESSURE: 87 MMHG | OXYGEN SATURATION: 96 %

## 2018-01-03 DIAGNOSIS — Z01.89 ENCOUNTER FOR PAIN MANAGEMENT PLANNING: ICD-10-CM

## 2018-01-03 DIAGNOSIS — Z51.81 ENCOUNTER FOR MEDICATION MONITORING: ICD-10-CM

## 2018-01-03 DIAGNOSIS — R42 DIZZINESS: ICD-10-CM

## 2018-01-03 DIAGNOSIS — R00.2 PALPITATIONS: ICD-10-CM

## 2018-01-03 DIAGNOSIS — E55.9 HYPOVITAMINOSIS D: ICD-10-CM

## 2018-01-03 DIAGNOSIS — I10 ESSENTIAL HYPERTENSION: Primary | ICD-10-CM

## 2018-01-03 DIAGNOSIS — E66.9 NON MORBID OBESITY: ICD-10-CM

## 2018-01-03 DIAGNOSIS — Z91.09 ENVIRONMENTAL ALLERGIES: ICD-10-CM

## 2018-01-03 DIAGNOSIS — M47.816 LUMBAR ARTHROPATHY: ICD-10-CM

## 2018-01-03 RX ORDER — HYDROCODONE BITARTRATE AND ACETAMINOPHEN 5; 325 MG/1; MG/1
TABLET ORAL
Qty: 60 TAB | Refills: 0 | Status: SHIPPED | OUTPATIENT
Start: 2018-01-03 | End: 2018-04-13 | Stop reason: SDUPTHER

## 2018-01-03 RX ORDER — MOMETASONE FUROATE 50 UG/1
2 SPRAY, METERED NASAL AS NEEDED
Qty: 1 CONTAINER | Refills: 11 | Status: SHIPPED | OUTPATIENT
Start: 2018-01-03 | End: 2020-05-28 | Stop reason: SDUPTHER

## 2018-01-03 NOTE — PROGRESS NOTES
Chief Complaint   Patient presents with    Insomnia     follow up     1. Have you been to the ER, urgent care clinic since your last visit? Hospitalized since your last visit? No    2. Have you seen or consulted any other health care providers outside of the [address] [place] [address] since your last visit? Include any pap smears or colon screening.  No

## 2018-01-03 NOTE — MR AVS SNAPSHOT
Visit Information Date & Time Provider Department Dept. Phone Encounter #  
 1/3/2018  3:00 PM Sourav Sanchez MD [facility] [phone] [id number] Follow-up Instructions Return in about 3 months (around 4/16/2018). Upcoming Health Maintenance Date Due FOBT Q 1 YEAR AGE 50-75 6/12/2016 Pneumococcal 19-64 Highest Risk (2 of 3 - PCV13) 5/27/2017 PAP AKA CERVICAL CYTOLOGY 11/17/2018 DTaP/Tdap/Td series (2 - Td) 5/1/2023 Allergies as of 1/3/2018  Review Complete On: 1/3/2018 By: Saba Kemp LPN Severity Noted Reaction Type Reactions Other Food  10/18/2013    Other (comments) PT STATES OTHER FOOD ALLERGIES WITH VARYING REACTIONS:  PORK, LETTUCE, LIMA BEANS, BLACK PEPPER, TEA, MALT, WALNUTS. Beef Containing Products High 04/24/2012   Systemic Anaphylaxis Food Extracts High 04/10/2012    Anaphylaxis Beef, milk, peanuts Milk High 04/24/2012   Systemic Anaphylaxis Peanut High 04/24/2012   Systemic Anaphylaxis Alpha-d-galactosidase  11/13/2014    Diarrhea Azithromycin  07/19/2011   Side Effect Nausea and Vomiting Erythromycin  04/19/2010    Nausea and Vomiting Guaifenesin  06/22/2016    Nausea and Vomiting Morphine  10/25/2010    Nausea and Vomiting Projectile vomiting per patient Other Medication  10/03/2013    Other (comments) WHITE CELL STIMULATOR INJECTION (NEULASTIN?) CAUSED SEVERE BONE PAIN LASTING A COUPLE WEEKS. Current Immunizations  Reviewed on 1/3/2018 Name Date Influenza Vaccine 9/10/2017, 10/31/2016, 9/22/2015 Influenza Vaccine Split 10/20/2010 Tdap 5/1/2013 Reviewed by Sourav Sanchez MD on 1/3/2018 at  4:32 PM  
You Were Diagnosed With   
  
 Codes Comments Dizziness    -  Primary ICD-10-CM: E30 ICD-9-CM: 780. 4 Chronic pain syndrome     ICD-10-CM: G89.4 ICD-9-CM: 338.4 Essential hypertension     ICD-10-CM: I10 
ICD-9-CM: 401.9 Encounter for medication monitoring     ICD-10-CM: Z51.81 
ICD-9-CM: V58.83 Palpitations     ICD-10-CM: R00.2 ICD-9-CM: 785.1 Encounter for pain management planning     ICD-10-CM: Z01.89 ICD-9-CM: V72.85 Hypovitaminosis D     ICD-10-CM: E55.9 ICD-9-CM: 268.9 Environmental allergies     ICD-10-CM: Z91.09 
ICD-9-CM: V15.09 Vitals BP Pulse Temp Resp Height(growth percentile) Weight(growth percentile) 116/87 (BP 1 Location: Right arm, BP Patient Position: Sitting) 88 98.4 °F (36.9 °C) (Oral) 18 5' 1\" (1.549 m) 194 lb 6.4 oz (88.2 kg) LMP SpO2 PF BMI OB Status Smoking Status 08/03/2012 96% 450 L/min 36.73 kg/m2 Hysterectomy Never Smoker Vitals History BMI and BSA Data Body Mass Index Body Surface Area  
 36.73 kg/m 2 1.95 m 2 Preferred Pharmacy Pharmacy Name Phone [id number], [place] [address] [phone] Your Updated Medication List  
  
   
This list is accurate as of: 1/3/18  5:02 PM.  Always use your most recent med list.  
  
  
  
  
 albuterol sulfate 90 mcg/actuation Aepb Commonly known as:  Adán Resides Take 2 Puffs by inhalation every four (4) hours as needed. ALPRAZolam 0.25 mg tablet Commonly known as:  XANAX  
TAKE 1 OR 2 TABLETS THREE TIMES A DAY AS NEEDED  
  
 BENADRYL 25 mg capsule Generic drug:  diphenhydrAMINE Take 50 mg by mouth every six (6) hours as needed. Indications: ALLERGIC REACTIONS  
  
 celecoxib 200 mg capsule Commonly known as:  CELEBREX  
TAKE 1 CAPSULE TWICE A DAY AS NEEDED CLARITIN 10 mg tablet Generic drug:  loratadine Take 10 mg by mouth daily (after lunch). TAKES AS NEEDED  
  
 EPINEPHrine 0.3 mg/0.3 mL injection Commonly known as:  EPIPEN 2-JULIAN  
0.3 mL by IntraMUSCular route once as needed for up to 1 dose. esomeprazole 40 mg capsule Commonly known as:  NexIUM Take 1 Cap by mouth daily. gabapentin 300 mg capsule Commonly known as:  NEURONTIN  
TAKE ONE CAPSULE BY MOUTH EACH NIGHT HYDROcodone-acetaminophen 5-325 mg per tablet Commonly known as:  NORCO  
TAKE 1 TABLET EVERY 8 HOURS AS NEEDED FOR PAIN  
  
 metoprolol succinate 25 mg XL tablet Commonly known as:  TOPROL XL Take 0.5 Tabs by mouth daily. mometasone 50 mcg/actuation nasal spray Commonly known as:  NASONEX  
2 Sprays by Both Nostrils route as needed. pilocarpine 5 mg tablet Commonly known as:  Merryl Whitney Take 1 tab 2 times daily  
  
 tamoxifen 20 mg tablet Commonly known as:  NOLVADEX Take 1 Tab by mouth nightly. zolpidem CR 6.25 mg tablet Commonly known as:  AMBIEN CR  
TAKE 1 TABLET BY MOUTH IN THE EVENING AS NEEDED FOR SLEEP ZyrTEC 10 mg tablet Generic drug:  cetirizine Take 10 mg by mouth nightly. Prescriptions Printed Refills HYDROcodone-acetaminophen (NORCO) 5-325 mg per tablet 0 Sig: TAKE 1 TABLET EVERY 8 HOURS AS NEEDED FOR PAIN Class: Print We Performed the Following 9-DRUG SCREEN + OXY, UR [id number] CPT(R)] METABOLIC PANEL, BASIC [01290 CPT(R)] REFERRAL TO CARDIOLOGY [JKC34 Custom] Comments:  
 cardiologist  
 TSH 3RD GENERATION [98603 CPT(R)] VITAMIN D, 25 HYDROXY [id number] CPT(R)] Follow-up Instructions Return in about 3 months (around 4/16/2018). Referral Information Referral ID Referred By Referred To  
  
 [id number] Daniele MOULTON Not Available Visits Status Start Date End Date 1 New Request 1/3/18 1/3/19 If your referral has a status of pending review or denied, additional information will be sent to support the outcome of this decision. Introducing [facility]! Dear Sterling Pink: Thank you for requesting a [employer] account. Our records indicate that you already have an active [employer] account. You can access your account anytime at [email or web address] Did you know that you can access your hospital and ER discharge instructions at any time in [employer] You can also review all of your test results from your hospital stay or ER visit. Additional Information If you have questions, please visit the Frequently Asked Questions section of the [employer] website at [email or web address] Remember, [employer] is NOT to be used for urgent needs. For medical emergencies, dial 911. Now available from your iPhone and Android! Please provide this summary of care documentation to your next provider. Your primary care clinician is listed as Marek Cruz. If you have any questions after today's visit, please call [phone].

## 2018-01-03 NOTE — PROGRESS NOTES
HISTORY OF PRESENT ILLNESS  Shai Burger is a 47 y.o. female. HPI   Follow up on chronic medical problems. Cardiovascular Review:  The patient has hypertension. Diet and Lifestyle: generally follows a low fat low cholesterol diet, generally follows a low sodium diet, exercises sporadically  Home BP Monitoring: is not measured at home. Pertinent ROS: taking medications as instructed, no medication side effects noted, no TIA's, no chest pain on exertion, no dyspnea on exertion, no swelling of ankles. She has been having period of palpitations and dizziness over the past several months. Feels like her heart is racing. Breaks out into a sweats at times. No chest pain or SOB noted. Sx occurs about 1 to 2 times in a weeks and may last for several minutes. Anxiety Review:  Patient is seen for ADD, sleep disturbance and anxiety. Current treatment includes Xanax. Ongoing symptoms include: insomnia. She is using [place] for sleep. Reported side effects from the treatment: none. Encounter for pain management. 1./2. Medical history/Past medical History  Chronic Pain:  Patient has osteoarthritis in the lower back and suffer with chronic pain in her lower back. Has been taking celebrex twice a day as long as she does not have GI upset. She uses the hydrocodone for more severe pain. Pain ranges from 6-9/10. MRI lumbar spine in 2009 showed DDD then with bulging disc and central radial teat in L4-5. She had laminectomy in 2010 for one disc. She has had multiple steroid epidural injections. Symptoms onset: problem is longstanding. Rheumatological ROS: stable, mild-to-moderate joint symptoms intermittently, reasonably well controlled by PRN meds. Response to treatment plan: stable. 3. Applicable records from prior treatment providers are apart of [facility] under the media tab. 4. Diagnostic, therapeutic and laboratory results are available in the [address] [place] [address] chart.     5. Consultation notes are available for review in the media tab of the [facility] chart. 6. Treatment goals include pain control so that the pt may be as active and function with her daily activities and improved comfort level. Previously pt has been limited by pain. 7. The risks and benefits of treatment has been discussed at this office visit with the pt. She understands that the medication has addicting potential.  Additionally the pt has been advised that narcotic pain medication may impair mental and/or physical ability required for performance of tasks such as driving or operating any other machinery. 8. Pt has an updated signed pain contract on file and can be found under the FYI section of the [facility] chart. 9. The pain contract is reviewed. Pain medication will be continued at the previous dosage. Urine drug screening will be done today. Diagnostic studies are not indicated at this time. Interventional procedure are not indicated at this time. 10. Medication prescibed is HYDROcodone-acetaminophen (NORCO) 5-325 mg per tablet .  has been reviewed at this OV by me. 11. Patient instructions have been reviewed in detail as outlined above and in the pain contract. 12. Re-eval is planned for 3 months. Patient Active Problem List   Diagnosis Code    Palpitations R00.2    Depression F32.9    Environmental allergies Z91.09    Breast cancer, stage 1 ([facility] [address] C50.919    GERD (gastroesophageal reflux disease) K21.9    Insomnia G47.00    Essential hypertension I10    ADD (attention deficit disorder) F98.8    Encounter for medication monitoring Z51.81       Current Outpatient Prescriptions   Medication Sig Dispense Refill    HYDROcodone-acetaminophen (NORCO) 5-325 mg per tablet TAKE 1 TABLET EVERY 8 HOURS AS NEEDED FOR PAIN 60 Tab 0    mometasone (NASONEX) 50 mcg/actuation nasal spray 2 Sprays by Both Nostrils route as needed.  1 Container 11    zolpidem CR (AMBIEN CR) 6.25 mg tablet TAKE 1 TABLET BY MOUTH IN THE EVENING AS NEEDED FOR SLEEP 90 Tab 1    ALPRAZolam (XANAX) 0.25 mg tablet TAKE 1 OR 2 TABLETS THREE TIMES A DAY AS NEEDED 90 Tab 1    gabapentin (NEURONTIN) 300 mg capsule TAKE ONE CAPSULE BY MOUTH EACH NIGHT 90 Cap 0    metoprolol succinate (TOPROL XL) 25 mg XL tablet Take 0.5 Tabs by mouth daily. 45 Tab 1    albuterol sulfate (PROAIR RESPICLICK) 90 mcg/actuation aepb Take 2 Puffs by inhalation every four (4) hours as needed. 1 Inhaler 0    esomeprazole (NEXIUM) 40 mg capsule Take 1 Cap by mouth daily. 30 Cap 11    celecoxib (CELEBREX) 200 mg capsule TAKE 1 CAPSULE TWICE A DAY AS NEEDED  4    pilocarpine (SALAGEN) 5 mg tablet Take 1 tab 2 times daily  2    tamoxifen (NOLVADEX) 20 mg tablet Take 1 Tab by mouth nightly. 90 Tab 3    EPINEPHrine (EPIPEN 2-JULIAN) 0.3 mg/0.3 mL (1:1,000) injection 0.3 mL by IntraMUSCular route once as needed for up to 1 dose. 0.6 mL 1    diphenhydrAMINE (BENADRYL) 25 mg capsule Take 50 mg by mouth every six (6) hours as needed. Indications: ALLERGIC REACTIONS      loratadine (CLARITIN) 10 mg tablet Take 10 mg by mouth daily (after lunch). TAKES AS NEEDED      cetirizine (ZYRTEC) 10 mg tablet Take 10 mg by mouth nightly. Allergies   Allergen Reactions    Other Food Other (comments)     PT STATES OTHER FOOD ALLERGIES WITH VARYING REACTIONS:  PORK, LETTUCE, LIMA BEANS, BLACK PEPPER, TEA, MALT, WALNUTS.  Beef Containing Products Anaphylaxis    Food Extracts Anaphylaxis     Beef, milk, peanuts    Milk Anaphylaxis    Peanut Anaphylaxis    Alpha-D-Galactosidase Diarrhea    Azithromycin Nausea and Vomiting    Erythromycin Nausea and Vomiting    Guaifenesin Nausea and Vomiting    Morphine Nausea and Vomiting     Projectile vomiting per patient    Other Medication Other (comments)     WHITE CELL STIMULATOR INJECTION (NEULASTIN?) CAUSED SEVERE BONE PAIN LASTING A COUPLE WEEKS.          Past Medical History:   Diagnosis Date    Anemia 10/3/13 \"SINCE I WAS A KID\"    Anxiety     DURING CHEMO; PT STAYS XANAX AS OF 10/3/13    Cancer ([facility] [address] [id number]    breast,chemo ended 10/2012    Chronic pain     si joints sacrum    Depression     Nausea & vomiting     used patch in past with good results    Other ill-defined conditions(799.89)     anemia,chronic diarrhea, TMJ    Ovarian cyst     Palpitations     TAKES METOPROLOL FOR TACHYCARDIA    PUD (peptic ulcer disease)     occurred at age 33 .     Unspecified adverse effect of anesthesia     TMJ         Past Surgical History:   Procedure Laterality Date    BREAST SURGERY PROCEDURE UNLISTED      breast biopsy x3 left breast    HX BREAST RECONSTRUCTION  7/10/2013    BREAST NIPPLE RECONSTRUCTION REVISION performed by Marena Seip, MD at [facility] MAIN OR    HX BREAST RECONSTRUCTION  2012    REINCISION LEFT BREAST FOR REMAINDER OF BREAST CA    HX BREAST RECONSTRUCTION  2012    R SEROMA EXCISED, REPLACE IMPLANT R BREAST    HX  SECTION      HX GI      anal fissure repair    HX GYN  10/21/13    [facility]/BSO    HX HYSTERECTOMY  10/2013    Dr. Dora Graham    HX MASTECTOMY  2012    double    HX OTHER SURGICAL      DENTAL SURGERIES WITH SEDATION    HX VASCULAR ACCESS  2012    PORTACATH INSERTION    HX VASCULAR ACCESS  2012    PORTCATH REMOVAL    LAMINOTOMY  2010    OR COLONOSCOPY FLX DX W/COLLJ SPEC WHEN PFRMD  2011    Dr Pam Baker         Family History   Problem Relation Age of Onset    Breast Cancer Mother 64    Cancer Mother 64      OF LUNG CA    Hypertension Father     Diabetes Father     Emphysema Father     COPD Father     Heart Attack Sister 50    Heart Disease Sister     Breast Cancer Sister     No Known Problems Brother     Anxiety Brother     No Known Problems Sister     Breast Cancer Paternal Grandmother     Malignant Hyperthermia Neg Hx     Pseudocholinesterase Deficiency Neg Hx     Delayed Awakening Neg Hx     Post-op Nausea/Vomiting Neg Hx     Emergence Delirium Neg Hx     Post-op Cognitive Dysfunction Neg Hx     Other Neg Hx        Social History   Substance Use Topics    Smoking status: Never Smoker    Smokeless tobacco: Never Used    Alcohol use No        Review of Systems   Constitutional: Negative for malaise/fatigue. HENT: Negative for congestion. Eyes: Negative for blurred vision. Respiratory: Negative for cough and shortness of breath. Cardiovascular: Negative for chest pain, palpitations and leg swelling. Gastrointestinal: Negative for abdominal pain, constipation and heartburn. Genitourinary: Negative for dysuria, frequency and urgency. Musculoskeletal: Positive for back pain. Negative for joint pain. Neurological: Negative for dizziness, tingling and headaches. Endo/Heme/Allergies: Negative for environmental allergies. Psychiatric/Behavioral: Negative for depression. The patient does not have insomnia. Physical Exam   Constitutional: She appears well-developed and well-nourished. /87 (BP 1 Location: Right arm, BP Patient Position: Sitting)  Pulse 88  Temp 98.4 °F (36.9 °C) (Oral)   Resp 18  Ht 5' 1\" (1.549 m)  Wt 194 lb 6.4 oz (88.2 kg)  LMP 08/03/2012  SpO2 96%   L/min  BMI 36.73 kg/m2     HENT:   Right Ear: Tympanic membrane and ear canal normal.   Left Ear: Tympanic membrane and ear canal normal.   Nose: No mucosal edema or rhinorrhea. Mouth/Throat: Oropharynx is clear and moist and mucous membranes are normal.   Neck: Normal range of motion. Neck supple. No thyromegaly present. Cardiovascular: Normal rate and regular rhythm. No murmur heard. Pulmonary/Chest: Effort normal and breath sounds normal.   Abdominal: Soft. Bowel sounds are normal. There is no tenderness. Musculoskeletal: Normal range of motion. She exhibits no edema. Lumbar back: She exhibits tenderness and bony tenderness. She exhibits normal range of motion.    Lymphadenopathy:     She has no cervical adenopathy. Skin: Skin is warm and dry. Small blackhead lesion on the upper left arm. Psychiatric: She has a normal mood and affect. Nursing note and vitals reviewed. ASSESSMENT and PLAN  Diagnoses and all orders for this visit:    1. Essential hypertension  Stable     2. Dizziness  3. Palpitations  -     REFERRAL TO CARDIOLOGY  -     TSH 3RD GENERATION    4. Lumbar arthropathy (HCC)  -     HYDROcodone-acetaminophen (NORCO) 5-325 mg per tablet; TAKE 1 TABLET EVERY 8 HOURS AS NEEDED FOR PAIN    5. Encounter for pain management planning  -     9-DRUG SCREEN + OXY, UR  -     METABOLIC PANEL, BASIC  The pt has signed medication agreement. Pain contract is reviewed. Pain medications will be continued at the previous dosage. Urine drug screening will be done today. Diagnostic  studies are not indicated at this time. Interventional procedure are not indicated at this time. Re-eval in 3 months. 6. Hypovitaminosis D  -     VITAMIN D, 25 HYDROXY    7. Environmental allergies  -     mometasone (NASONEX) 50 mcg/actuation nasal spray; 2 Sprays by Both Nostrils route as needed. 8. Encounter for medication monitoring    9. Non morbid obesity  Discussed weight loss options, diet and exercise. Also reviewed health issues related to obesity. Initial goal of weight loss 10% of current weight. Counseled on goal for exercise of eventual goal of 30-60 minutes 5-7 times a week as per AHA guidelines. Decrease carbohydrates (white foods, sweet foods, sweet drinks and alcohol), increase green leafy vegetables and protein (lean meats and beans). Avoid fried foods. Increase water intake. Eat 3-5 small meals daily. Increase physical activity. Follow-up Disposition:  Return in about 3 months (around 4/16/2018).   reviewed diet, exercise and weight control  cardiovascular risk and specific lipid/LDL goals reviewed  reviewed medications and side effects in detail    I have discussed diagnosis listed in this note with pt and/or family. I have discussed treatment plans and options and the risk/benefit analysis of those options, including safe use of medications and possible medication side effects. Through the use of shared decision making we have agreed to the above plan. The patient has received an after-visit summary and questions were answered concerning future plans and follow up. Advise pt of any urgent changes then to proceed to the ER.

## 2018-01-04 LAB
25(OH)D3+25(OH)D2 SERPL-MCNC: 22.7 NG/ML (ref 30–100)
AMPHETAMINES UR QL SCN: NEGATIVE NG/ML
BARBITURATES UR QL SCN: NEGATIVE NG/ML
BENZODIAZ UR QL SCN: NEGATIVE NG/ML
BUN SERPL-MCNC: 10 MG/DL (ref 6–24)
BUN/CREAT SERPL: 14 (ref 9–23)
BZE UR QL SCN: NEGATIVE NG/ML
CALCIUM SERPL-MCNC: 9.2 MG/DL (ref 8.7–10.2)
CANNABINOIDS UR QL SCN: NEGATIVE NG/ML
CHLORIDE SERPL-SCNC: 101 MMOL/L (ref 96–106)
CO2 SERPL-SCNC: 25 MMOL/L (ref 18–29)
CREAT SERPL-MCNC: 0.72 MG/DL (ref 0.57–1)
CREAT UR-MCNC: 38.8 MG/DL (ref 20–300)
GLUCOSE SERPL-MCNC: 93 MG/DL (ref 65–99)
METHADONE UR QL SCN: NEGATIVE NG/ML
OPIATES UR QL SCN: NEGATIVE NG/ML
OXYCODONE+OXYMORPHONE UR QL SCN: NEGATIVE NG/ML
PCP UR QL SCN: NEGATIVE NG/ML
PH UR: 6.2 [PH] (ref 4.5–8.9)
PLEASE NOTE:, 733163: NORMAL
POTASSIUM SERPL-SCNC: 4 MMOL/L (ref 3.5–5.2)
PROPOXYPH UR QL SCN: NEGATIVE NG/ML
SODIUM SERPL-SCNC: 141 MMOL/L (ref 134–144)
TSH SERPL DL<=0.005 MIU/L-ACNC: 2.33 UIU/ML (ref 0.45–4.5)

## 2018-01-05 RX ORDER — ERGOCALCIFEROL 1.25 MG/1
50000 CAPSULE ORAL
Qty: 5 CAP | Refills: 6 | Status: SHIPPED | OUTPATIENT
Start: 2018-01-05 | End: 2018-09-07 | Stop reason: SDUPTHER

## 2018-01-24 ENCOUNTER — OFFICE VISIT (OUTPATIENT)
Dept: CARDIOLOGY CLINIC | Age: 53
End: 2018-01-24

## 2018-01-24 VITALS
DIASTOLIC BLOOD PRESSURE: 74 MMHG | OXYGEN SATURATION: 98 % | HEIGHT: 61 IN | HEART RATE: 84 BPM | WEIGHT: 194.2 LBS | BODY MASS INDEX: 36.67 KG/M2 | SYSTOLIC BLOOD PRESSURE: 116 MMHG

## 2018-01-24 DIAGNOSIS — R00.2 PALPITATIONS: ICD-10-CM

## 2018-01-24 DIAGNOSIS — I10 ESSENTIAL HYPERTENSION: Primary | ICD-10-CM

## 2018-01-24 DIAGNOSIS — R55 NEAR SYNCOPE: ICD-10-CM

## 2018-01-24 DIAGNOSIS — R06.09 DOE (DYSPNEA ON EXERTION): ICD-10-CM

## 2018-01-24 NOTE — PROGRESS NOTES
Lorenzo England DNP, ANP-BC  Subjective/HPI: Kailey Grubbs is a 47 y.o. female is here for new patient consultation for episodes of intermittent dizziness with near syncopal feelings, palpitations and tachycardia without exertional activities. She states she has been seen by cardiology before with normal echo, stress test and monitoring performed approximately 5 years ago. She reports being on metoprolol for tachycardia and hypertension. Cardiac risk factors include  Obesity, hypertension, early family history of heart disease    PCP Provider  Marek Cruz MD  Past Medical History:   Diagnosis Date    Anemia 10/3/13    \"SINCE I WAS A KID\"    Anxiety     DURING CHEMO; PT STAYS XANAX AS OF 10/3/13    [facility] ([facility] [phone]    breast,chemo ended 10/2012    Chronic pain     si joints sacrum    Depression     Nausea & vomiting     used patch in past with good results    Other ill-defined conditions(799.89)     anemia,chronic diarrhea, TMJ    Ovarian cyst     Palpitations     TAKES METOPROLOL FOR TACHYCARDIA    PUD (peptic ulcer disease)     occurred at age 33 .     Unspecified adverse effect of anesthesia     TMJ      Past Surgical History:   Procedure Laterality Date    BREAST SURGERY PROCEDURE UNLISTED      breast biopsy x3 left breast    HX BREAST RECONSTRUCTION  7/10/2013    BREAST NIPPLE RECONSTRUCTION REVISION performed by Maribel Weinstein MD at [address] HX BREAST RECONSTRUCTION  2012    REINCISION LEFT BREAST FOR REMAINDER OF BREAST CA    HX BREAST RECONSTRUCTION  2012    R SEROMA EXCISED, REPLACE IMPLANT R BREAST    HX  SECTION  1984    HX GI      anal fissure repair    HX GYN  10/21/13    [facility]/BSO    HX HYSTERECTOMY  10/2013    Dr. Nahomi King    HX MASTECTOMY  2012    double    HX OTHER SURGICAL      DENTAL SURGERIES WITH SEDATION    HX VASCULAR ACCESS  2012    PORTACATH INSERTION    HX VASCULAR ACCESS  2012    [facility] REMOVAL    LAMINOTOMY  2010    OK COLONOSCOPY FLX DX W/COLLJ SPEC WHEN PFRMD  2011    Dr Esthela Jacobson Other (comments)     PT STATES OTHER FOOD ALLERGIES WITH VARYING REACTIONS:  PORK, LETTUCE, LIMA BEANS, BLACK PEPPER, TEA, MALT, WALNUTS.  Beef Containing Products Anaphylaxis    Food Extracts Anaphylaxis     Beef, milk, peanuts    Milk Anaphylaxis    Peanut Anaphylaxis    Alpha-D-Galactosidase Diarrhea    Azithromycin Nausea and Vomiting    Erythromycin Nausea and Vomiting    Guaifenesin Nausea and Vomiting    Morphine Nausea and Vomiting     Projectile vomiting per patient    Other Medication Other (comments)     WHITE CELL STIMULATOR INJECTION (NEULASTIN?) CAUSED SEVERE BONE PAIN LASTING A COUPLE WEEKS. Family History   Problem Relation Age of Onset    Breast Cancer Mother 64    Cancer Mother 64      OF LUNG CA    Hypertension Father     Diabetes Father     Emphysema Father     COPD Father     Heart Attack Sister 50    Heart Disease Sister     Breast Cancer Sister     No Known Problems Brother     Anxiety Brother     No Known Problems Sister     Breast Cancer Paternal Grandmother     Malignant Hyperthermia Neg Hx     Pseudocholinesterase Deficiency Neg Hx     Delayed Awakening Neg Hx     Post-op Nausea/Vomiting Neg Hx     Emergence Delirium Neg Hx     Post-op Cognitive Dysfunction Neg Hx     Other Neg Hx       Current Outpatient Prescriptions   Medication Sig    ergocalciferol (VITAMIN D2) 50,000 unit capsule Take 1 Cap by mouth every seven (7) days.  HYDROcodone-acetaminophen (NORCO) 5-325 mg per tablet TAKE 1 TABLET EVERY 8 HOURS AS NEEDED FOR PAIN    mometasone (NASONEX) 50 mcg/actuation nasal spray 2 Sprays by Both Nostrils route as needed.     zolpidem CR (AMBIEN CR) 6.25 mg tablet TAKE 1 TABLET BY MOUTH IN THE EVENING AS NEEDED FOR SLEEP    ALPRAZolam (XANAX) 0.25 mg tablet TAKE 1 OR 2 TABLETS THREE TIMES A DAY AS NEEDED    metoprolol succinate (TOPROL XL) 25 mg XL tablet Take 0.5 Tabs by mouth daily.  albuterol sulfate (PROAIR RESPICLICK) 90 mcg/actuation aepb Take 2 Puffs by inhalation every four (4) hours as needed.  esomeprazole (NEXIUM) 40 mg capsule Take 1 Cap by mouth daily.  celecoxib (CELEBREX) 200 mg capsule TAKE 1 CAPSULE TWICE A DAY AS NEEDED    pilocarpine (SALAGEN) 5 mg tablet Take 1 tab 2 times daily    tamoxifen (NOLVADEX) 20 mg tablet Take 1 Tab by mouth nightly.  EPINEPHrine (EPIPEN 2-JULIAN) 0.3 mg/0.3 mL (1:1,000) injection 0.3 mL by IntraMUSCular route once as needed for up to 1 dose.  diphenhydrAMINE (BENADRYL) 25 mg capsule Take 50 mg by mouth every six (6) hours as needed. Indications: ALLERGIC REACTIONS    loratadine (CLARITIN) 10 mg tablet Take 10 mg by mouth daily (after lunch). TAKES AS NEEDED    cetirizine (ZYRTEC) 10 mg tablet Take 10 mg by mouth nightly.  gabapentin (NEURONTIN) 300 mg capsule TAKE ONE CAPSULE BY MOUTH EACH NIGHT     No current facility-administered medications for this visit. Vitals:    01/24/18 0933 01/24/18 0940 01/24/18 0942   BP: 136/78 130/76 116/74   Pulse: 84     SpO2: 98%     Weight: 194 lb 3.2 oz (88.1 kg)     Height: 5' 1\" (1.549 m)       Social History     Social History    Marital status: SINGLE     Spouse name: N/A    Number of children: N/A    Years of education: N/A     Occupational History    Not on file. Social History Main Topics    Smoking status: Never Smoker    Smokeless tobacco: Never Used    Alcohol use No    Drug use: No    Sexual activity: Yes     Partners: Male     Other Topics Concern    Not on file     Social History Narrative       I have reviewed the nurses notes, vitals, problem list, allergy list, medical history, family, social history and medications. Review of Symptoms:    General: Pt denies excessive weight gain or loss.  Pt is able to conduct ADL's  HEENT: Denies blurred vision, headaches, epistaxis and difficulty swallowing. Respiratory: Denies shortness of breath, + HUYNH, denies wheezing or stridor. Cardiovascular: Denies precordial pain, + palpitations, edema or PND  Gastrointestinal: Denies poor appetite, indigestion, abdominal pain or blood in stool  Musculoskeletal: Denies pain or swelling from muscles or joints  Neurologic: Denies tremor, paresthesias, or sensory motor disturbance  Skin: Denies rash, itching or texture change. Physical Exam:      General: Well developed, in no acute distress, cooperative and alert  HEENT: No carotid bruits, no JVD, trach is midline. Neck Supple, PEERL, EOM intact. Heart:  Normal S1/S2 negative S3 or S4. Regular, no murmur, gallop or rub.   Respiratory: Clear bilaterally x 4, no wheezing or rales  Abdomen:   Soft, non-tender, no masses, bowel sounds are active.   Extremities:  No edema, normal cap refill, no cyanosis, atraumatic. Neuro: A&Ox3, speech clear, gait stable. Skin: Skin color is normal. No rashes or lesions.  Non diaphoretic  Vascular: 2+ pulses symmetric in all extremities    Cardiographics    ECG: Normal sinus rhythm  Results for orders placed or performed during the hospital encounter of 06/26/13   EKG, 12 LEAD, INITIAL   Result Value Ref Range    Ventricular Rate 70 BPM    Atrial Rate 70 BPM    P-R Interval 154 ms    QRS Duration 112 ms    Q-T Interval 412 ms    QTC Calculation (Bezet) 444 ms    Calculated P Axis 50 degrees    Calculated R Axis 31 degrees    Calculated T Axis 55 degrees    Diagnosis       Normal sinus rhythm  Normal ECG  When compared with ECG of 15-GORDON-2013 08:40,  No significant change was found  Confirmed by Chelle Balderrama M.D., Susan Tavarez ([id number]) on 6/26/2013 4:46:15 PM   Results for orders placed or performed in visit on 07/19/11   [facility] POC EKG ROUTINE W/ 12 LEADS, INTER & REP    Impression    wnl         Cardiology Labs:  Lab Results   Component Value Date/Time    Cholesterol, total 196 08/27/2014 09:01 AM    HDL Cholesterol 51 08/27/2014 09:01 AM    LDL, calculated 118 08/27/2014 09:01 AM    Triglyceride 136 08/27/2014 09:01 AM    CHOL/HDL Ratio 3.6 12/15/2009 02:10 PM       Lab Results   Component Value Date/Time    Sodium 141 01/03/2018 05:01 PM    Potassium 4.0 01/03/2018 05:01 PM    Chloride 101 01/03/2018 05:01 PM    CO2 25 01/03/2018 05:01 PM    Anion gap 6 10/22/2013 04:40 AM    Glucose 93 01/03/2018 05:01 PM    BUN 10 01/03/2018 05:01 PM    Creatinine 0.72 01/03/2018 05:01 PM    BUN/Creatinine ratio 14 01/03/2018 05:01 PM    GFR est  01/03/2018 05:01 PM    GFR est non-AA 97 01/03/2018 05:01 PM    Calcium 9.2 01/03/2018 05:01 PM    Bilirubin, total 0.3 08/27/2014 09:01 AM    AST (SGOT) 15 08/27/2014 09:01 AM    Alk. phosphatase 77 08/27/2014 09:01 AM    Protein, total 6.6 08/27/2014 09:01 AM    Albumin 4.3 08/27/2014 09:01 AM    Globulin 3.2 10/03/2013 04:29 PM    A-G Ratio 1.9 08/27/2014 09:01 AM    ALT (SGPT) 20 08/27/2014 09:01 AM           Assessment:     Assessment:     Diagnoses and all orders for this visit:    1. Essential hypertension  -     AMB POC EKG ROUTINE W/ 12 LEADS, INTER & REP  -     2D ECHO COMPLETE ADULT (TTE) W OR WO CONTR; Future  -     LEXISCAN/CARDIOLITE, Clinic Performed; Future  -     LOOP MONITOR, Clinic Performed; Future    2. Palpitations  -     AMB POC EKG ROUTINE W/ 12 LEADS, INTER & REP  -     2D ECHO COMPLETE ADULT (TTE) W OR WO CONTR; Future  -     LEXISCAN/CARDIOLITE, Clinic Performed; Future  -     LOOP MONITOR, Clinic Performed; Future    3. HUYNH (dyspnea on exertion)  -     2D ECHO COMPLETE ADULT (TTE) W OR WO CONTR; Future  -     LEXISCAN/CARDIOLITE, Clinic Performed; Future  -     LOOP MONITOR, Clinic Performed; Future    4. Near syncope  -     2D ECHO COMPLETE ADULT (TTE) W OR WO CONTR; Future  -     LEXISCAN/CARDIOLITE, Clinic Performed; Future  -     LOOP MONITOR, Clinic Performed; Future        ICD-10-CM ICD-9-CM    1.  Essential hypertension I10 401.9 AMB POC EKG ROUTINE W/ 12 LEADS, INTER & REP      2D ECHO COMPLETE ADULT (TTE) W OR WO CONTR      STRESS TEST LEXISCAN/CARDIOLITE      LOOP MONITOR   2. Palpitations R00.2 785.1 AMB POC EKG ROUTINE W/ 12 LEADS, INTER & REP      2D ECHO COMPLETE ADULT (TTE) W OR WO CONTR      STRESS TEST LEXISCAN/CARDIOLITE      LOOP MONITOR   3. HUYNH (dyspnea on exertion) R06.09 786.09 2D ECHO COMPLETE ADULT (TTE) W OR WO CONTR      STRESS TEST LEXISCAN/CARDIOLITE      LOOP MONITOR   4. Near syncope R55 780.2 2D ECHO COMPLETE ADULT (TTE) W OR WO CONTR      STRESS TEST LEXISCAN/CARDIOLITE      LOOP MONITOR     Orders Placed This Encounter    LOOP MONITOR, Clinic Performed     Standing Status:   Future     Standing Expiration Date:   7/24/2018     Order Specific Question:   Reason for Exam:     Answer:   non daily episodes of tachycardiac w/ dizziness    AMB POC EKG ROUTINE W/ 12 LEADS, INTER & REP     Order Specific Question:   Reason for Exam:     Answer:   ROUTINE    LEXISCAN/CARDIOLITE, Clinic Performed     Standing Status:   Future     Standing Expiration Date:   7/24/2018     Order Specific Question:   Reason for Exam:     Answer:   HUYNH, fatigue and strong [address] of CAD    2D ECHO COMPLETE ADULT (TTE) W OR WO CONTR     Standing Status:   Future     Standing Expiration Date:   7/24/2018     Order Specific Question:   Reason for Exam:     Answer:   HUYNH, hx of breast CA with chemotherapy. Order Specific Question:   Contrast Enhancement (Bubble Study, Definity, Optison) may be used if criteria listed in established evidence-based protocol has been identified. Answer:   Yes        Plan:     Patient is a 27-year-old female with recurrent episodes of tachycardia at times associated with dizziness palpitations. Additionally, has progressive dyspnea on exertion and fatigue. Cardiac risk factors include strong family history of early CAD, hypertension, obesity and mildly elevated LDL.   Will evaluate with Lexiscan nuclear stress test as patient reports she will be unable to sufficiently walk on the treadmill, I do not want her beta-blocker  held for stress test either. Echocardiogram for cardiac structure and history of chemotherapy use, no radiation therapy. Event monitor to evaluate for arrhythmia. Reviewed labs from primary care showing normal metabolic panel and thyroid function. Follow-up when testing complete    Lorenzo England NP    This note was created using voice recognition software. Despite editing, there may be syntax errors. [facility]    1/24/2018         Patient seen, examined by me personally. Plan discussed as detailed. Agree with note as outlined by  NP. I confirm findings in history and physical exam. No additional findings noted. Agree with plan as outlined above.      Kareem Ellis MD

## 2018-01-24 NOTE — PROGRESS NOTES
Chief Complaint   Patient presents with    Shortness of Breath    Dizziness    Palpitations     1. Have you been to the ER, urgent care clinic since your last visit? Hospitalized since your last visit? No    2. Have you seen or consulted any other health care providers outside of the [address] [place] [address] since your last visit? Include any pap smears or colon screening.  No

## 2018-01-24 NOTE — MR AVS SNAPSHOT
[address] 
[phone] Patient: Kailey Grubbs MRN:  [id number] Visit Information Date & Time Provider Department Dept. Phone Encounter #  
 2018  9:30 AM Kareem Ellis MD [facility] [phone] [id number] Your Appointments 2018  2:00 PM  
ROUTINE CARE with Marek Cruz MD  
[address]) Appt Note: 3 moths f/u. caharris .3.18  
 [address] [place] [address] [phone]  
[phone] [address] [place] [address] [address] Upcoming Health Maintenance Date Due FOBT Q 1 YEAR AGE 50-75 2016 PAP AKA CERVICAL CYTOLOGY 2018 Pneumococcal 19-64 Highest Risk (3 of 3 - PCV13) 1/3/2019 DTaP/Tdap/Td series (2 - Td) 2023 Allergies as of 2018  Review Complete On: 2018 By: Lorenzo England NP Severity Noted Reaction Type Reactions Other Food  10/18/2013    Other (comments) PT STATES OTHER FOOD ALLERGIES WITH VARYING REACTIONS:  PORK, LETTUCE, LIMA BEANS, BLACK PEPPER, TEA, MALT, WALNUTS. Beef Containing Products High 2012   Systemic Anaphylaxis Food Extracts High 04/10/2012    Anaphylaxis Beef, milk, peanuts Milk High 2012   Systemic Anaphylaxis Peanut High 2012   Systemic Anaphylaxis Alpha-d-galactosidase  2014    Diarrhea Azithromycin  2011   Side Effect Nausea and Vomiting Erythromycin  2010    Nausea and Vomiting Guaifenesin  2016    Nausea and Vomiting Morphine  10/25/2010    Nausea and Vomiting Projectile vomiting per patient Other Medication  10/03/2013    Other (comments) WHITE CELL STIMULATOR INJECTION (NEULASTIN?) CAUSED SEVERE BONE PAIN LASTING A COUPLE WEEKS. Current Immunizations  Reviewed on 1/3/2018 Name Date Influenza Vaccine 9/10/2017, 10/31/2016, 9/22/2015 Influenza Vaccine Split 10/20/2010 Tdap 5/1/2013 Not reviewed this visit You Were Diagnosed With   
  
 Codes Comments Essential hypertension    -  Primary ICD-10-CM: I10 
ICD-9-CM: 401.9 Palpitations     ICD-10-CM: R00.2 ICD-9-CM: 785.1 HUYNH (dyspnea on exertion)     ICD-10-CM: R06.09 
ICD-9-CM: 786.09 Near syncope     ICD-10-CM: R55 
ICD-9-CM: 780. 2 Vitals BP Pulse Height(growth percentile) Weight(growth percentile) LMP SpO2  
 116/74 84 5' 1\" (1.549 m) 194 lb 3.2 oz (88.1 kg) 08/03/2012 98% BMI OB Status Smoking Status 36.69 kg/m2 Hysterectomy Never Smoker Vitals History BMI and BSA Data Body Mass Index Body Surface Area  
 36.69 kg/m 2 1.95 m 2 Preferred Pharmacy Pharmacy Name Phone [id number], [place] - [address] [phone] Your Updated Medication List  
  
   
This list is accurate as of: 1/24/18 11:02 AM.  Always use your most recent med list.  
  
  
  
  
 albuterol sulfate 90 mcg/actuation Aepb Commonly known as:  Brenita Zapata Take 2 Puffs by inhalation every four (4) hours as needed. ALPRAZolam 0.25 mg tablet Commonly known as:  XANAX  
TAKE 1 OR 2 TABLETS THREE TIMES A DAY AS NEEDED  
  
 BENADRYL 25 mg capsule Generic drug:  diphenhydrAMINE Take 50 mg by mouth every six (6) hours as needed. Indications: ALLERGIC REACTIONS  
  
 celecoxib 200 mg capsule Commonly known as:  CELEBREX  
TAKE 1 CAPSULE TWICE A DAY AS NEEDED CLARITIN 10 mg tablet Generic drug:  loratadine Take 10 mg by mouth daily (after lunch). TAKES AS NEEDED  
  
 EPINEPHrine 0.3 mg/0.3 mL injection Commonly known as:  EPIPEN 2-JULIAN  
0.3 mL by IntraMUSCular route once as needed for up to 1 dose. ergocalciferol 50,000 unit capsule Commonly known as:  VITAMIN D2  
 Take 1 Cap by mouth every seven (7) days. esomeprazole 40 mg capsule Commonly known as:  NexIUM Take 1 Cap by mouth daily. gabapentin 300 mg capsule Commonly known as:  NEURONTIN  
TAKE ONE CAPSULE BY MOUTH EACH NIGHT HYDROcodone-acetaminophen 5-325 mg per tablet Commonly known as:  NORCO  
TAKE 1 TABLET EVERY 8 HOURS AS NEEDED FOR PAIN  
  
 metoprolol succinate 25 mg XL tablet Commonly known as:  TOPROL XL Take 0.5 Tabs by mouth daily. mometasone 50 mcg/actuation nasal spray Commonly known as:  NASONEX  
2 Sprays by Both Nostrils route as needed. pilocarpine 5 mg tablet Commonly known as:  Miguelito Garter Take 1 tab 2 times daily  
  
 tamoxifen 20 mg tablet Commonly known as:  NOLVADEX Take 1 Tab by mouth nightly. zolpidem CR 6.25 mg tablet Commonly known as:  AMBIEN CR  
TAKE 1 TABLET BY MOUTH IN THE EVENING AS NEEDED FOR SLEEP ZyrTEC 10 mg tablet Generic drug:  cetirizine Take 10 mg by mouth nightly. We Performed the Following AMB POC EKG ROUTINE W/ 12 LEADS, INTER & REP [01729 CPT(R)] To-Do List   
 01/25/2018 Cardiac Services:  LOOP MONITOR   
  
 01/31/2018 ECHO:  2D ECHO COMPLETE ADULT (TTE) W OR WO CONTR   
  
 01/31/2018 ECG:  STRESS TEST LEXISCAN/CARDIOLITE Introducing [facility]! Dear Fany Perez: Thank you for requesting a [employer] account. Our records indicate that you already have an active [employer] account. You can access your account anytime at [email or web address]. [email or web address]/"[email or web address] Did you know that you can access your hospital and ER discharge instructions at any time in [employer]? You can also review all of your test results from your hospital stay or ER visit. Additional Information If you have questions, please visit the Frequently Asked Questions section of the [employer] website at [email or web address] [email or web address]/. Remember, MyChart is NOT to be used for urgent needs. For medical emergencies, dial 911. Now available from your iPhone and Android! Please provide this summary of care documentation to your next provider. Your primary care clinician is listed as Argenis Adams. If you have any questions after today's visit, please call [phone].

## 2018-03-14 RX ORDER — GABAPENTIN 300 MG/1
CAPSULE ORAL
Qty: 90 CAP | Refills: 1 | Status: SHIPPED | OUTPATIENT
Start: 2018-03-14 | End: 2018-04-13

## 2018-03-23 DIAGNOSIS — K21.9 GASTROESOPHAGEAL REFLUX DISEASE WITHOUT ESOPHAGITIS: ICD-10-CM

## 2018-03-23 RX ORDER — ESOMEPRAZOLE MAGNESIUM 40 MG/1
40 CAPSULE, DELAYED RELEASE ORAL DAILY
Qty: 30 CAP | Refills: 0 | Status: SHIPPED | OUTPATIENT
Start: 2018-03-23 | End: 2018-04-19 | Stop reason: SDUPTHER

## 2018-04-13 ENCOUNTER — OFFICE VISIT (OUTPATIENT)
Dept: FAMILY MEDICINE CLINIC | Age: 53
End: 2018-04-13

## 2018-04-13 VITALS
WEIGHT: 195.8 LBS | TEMPERATURE: 97.6 F | SYSTOLIC BLOOD PRESSURE: 140 MMHG | OXYGEN SATURATION: 98 % | BODY MASS INDEX: 36.97 KG/M2 | HEART RATE: 71 BPM | RESPIRATION RATE: 16 BRPM | DIASTOLIC BLOOD PRESSURE: 83 MMHG | HEIGHT: 61 IN

## 2018-04-13 DIAGNOSIS — F32.A DEPRESSION, UNSPECIFIED DEPRESSION TYPE: ICD-10-CM

## 2018-04-13 DIAGNOSIS — G89.29 ENCOUNTER FOR CHRONIC PAIN MANAGEMENT: ICD-10-CM

## 2018-04-13 DIAGNOSIS — Z51.81 ENCOUNTER FOR MEDICATION MONITORING: ICD-10-CM

## 2018-04-13 DIAGNOSIS — M47.816 LUMBAR ARTHROPATHY: Primary | ICD-10-CM

## 2018-04-13 DIAGNOSIS — F51.01 PRIMARY INSOMNIA: ICD-10-CM

## 2018-04-13 DIAGNOSIS — M54.9 CHRONIC UPPER BACK PAIN: ICD-10-CM

## 2018-04-13 DIAGNOSIS — F41.9 ANXIETY: ICD-10-CM

## 2018-04-13 DIAGNOSIS — I10 ESSENTIAL HYPERTENSION: ICD-10-CM

## 2018-04-13 DIAGNOSIS — G89.29 CHRONIC UPPER BACK PAIN: ICD-10-CM

## 2018-04-13 PROBLEM — E66.01 SEVERE OBESITY (BMI 35.0-39.9) WITH COMORBIDITY (HCC): Status: ACTIVE | Noted: 2018-04-13

## 2018-04-13 RX ORDER — PROMETHAZINE HYDROCHLORIDE AND DEXTROMETHORPHAN HYDROBROMIDE 6.25; 15 MG/5ML; MG/5ML
SYRUP ORAL
Refills: 1 | COMMUNITY
Start: 2018-02-25 | End: 2018-07-17 | Stop reason: ALTCHOICE

## 2018-04-13 RX ORDER — ZOLPIDEM TARTRATE 6.25 MG/1
TABLET, FILM COATED, EXTENDED RELEASE ORAL
Qty: 90 TAB | Refills: 0 | Status: SHIPPED | OUTPATIENT
Start: 2018-04-13 | End: 2018-08-01 | Stop reason: SDUPTHER

## 2018-04-13 RX ORDER — HYDROCODONE BITARTRATE AND ACETAMINOPHEN 5; 325 MG/1; MG/1
TABLET ORAL
Qty: 60 TAB | Refills: 0 | Status: SHIPPED | OUTPATIENT
Start: 2018-04-13 | End: 2018-09-06 | Stop reason: SDUPTHER

## 2018-04-13 RX ORDER — BUSPIRONE HYDROCHLORIDE 10 MG/1
10 TABLET ORAL 2 TIMES DAILY
Qty: 60 TAB | Refills: 6 | Status: SHIPPED | OUTPATIENT
Start: 2018-04-13 | End: 2018-06-15 | Stop reason: SDUPTHER

## 2018-04-13 RX ORDER — EPINEPHRINE 0.3 MG/.3ML
0.3 INJECTION SUBCUTANEOUS
Qty: 0.6 ML | Refills: 1 | Status: SHIPPED | OUTPATIENT
Start: 2018-04-13 | End: 2019-06-03 | Stop reason: SDUPTHER

## 2018-04-13 RX ORDER — PREDNISONE 10 MG/1
TABLET ORAL
Qty: 21 TAB | Refills: 0 | Status: SHIPPED | OUTPATIENT
Start: 2018-04-13 | End: 2018-07-17 | Stop reason: ALTCHOICE

## 2018-04-13 RX ORDER — NALOXONE HYDROCHLORIDE 2 MG/.4ML
2 INJECTION, SOLUTION INTRAMUSCULAR; SUBCUTANEOUS
Qty: 0.4 ML | Refills: 0 | Status: SHIPPED | OUTPATIENT
Start: 2018-04-13 | End: 2018-10-22 | Stop reason: ALTCHOICE

## 2018-04-13 RX ORDER — ALPRAZOLAM 0.25 MG/1
TABLET ORAL
Qty: 30 TAB | Refills: 0 | Status: SHIPPED | OUTPATIENT
Start: 2018-04-13 | End: 2018-07-17

## 2018-04-13 RX ORDER — ALPRAZOLAM 0.25 MG/1
TABLET ORAL
COMMUNITY
End: 2018-04-13 | Stop reason: SDUPTHER

## 2018-04-13 NOTE — MR AVS SNAPSHOT
[facility] 
 
 
 [address] [facility] [address] 
[phone] Patient: Larissa Sloan MRN: [id number] :[id number] Visit Information Date & Time Provider Department Dept. Phone Encounter #  
 2018  2:00 PM Lei Hernandez [phone] [id number] Follow-up Instructions Return in about 3 months (around 2018). Upcoming Health Maintenance Date Due FOBT Q 1 YEAR AGE 50-75 2016 PAP AKA CERVICAL CYTOLOGY 2018 Pneumococcal 19-64 Highest Risk (3 of 3 - PCV13) 1/3/2019 DTaP/Tdap/Td series (2 - Td) 2023 Allergies as of 2018  Review Complete On: 2018 By: Delphine Souza LPN Severity Noted Reaction Type Reactions Other Food  10/18/2013    Other (comments) PT STATES OTHER FOOD ALLERGIES WITH VARYING REACTIONS:  PORK, LETTUCE, LIMA BEANS, BLACK PEPPER, TEA, MALT, WALNUTS. Beef Containing Products High 2012   Systemic Anaphylaxis Food Extracts High 04/10/2012    Anaphylaxis Beef, milk, peanuts Milk High 2012   Systemic Anaphylaxis Peanut High 2012   Systemic Anaphylaxis Alpha-d-galactosidase  2014    Diarrhea Azithromycin  2011   Side Effect Nausea and Vomiting Erythromycin  2010    Nausea and Vomiting Guaifenesin  2016    Nausea and Vomiting Morphine  10/25/2010    Nausea and Vomiting Projectile vomiting per patient Other Medication  10/03/2013    Other (comments) WHITE CELL STIMULATOR INJECTION (NEULASTIN?) CAUSED SEVERE BONE PAIN LASTING A COUPLE WEEKS. Current Immunizations  Reviewed on 1/3/2018 Name Date Influenza Vaccine 9/10/2017, 10/31/2016, 2015 Influenza Vaccine Split 10/20/2010 Tdap 2013 Not reviewed this visit You Were Diagnosed With   
  
 Codes Comments Encounter for chronic pain management    -  Primary ICD-10-CM: G89.29 ICD-9-CM: V65.49 Lumbar arthropathy (HCC)     ICD-10-CM: M46.96 
ICD-9-CM: 721.3 Primary insomnia     ICD-10-CM: F51.01 
ICD-9-CM: 307.42 Vitals BP Pulse Temp Resp Height(growth percentile) Weight(growth percentile) 140/83 (BP 1 Location: Right arm, BP Patient Position: Sitting) 71 97.6 °F (36.4 °C) (Oral) 16 5' 1\" (1.549 m) 195 lb 12.8 oz (88.8 kg) LMP SpO2 BMI OB Status Smoking Status 08/03/2012 98% 37 kg/m2 Hysterectomy Never Smoker BMI and BSA Data Body Mass Index Body Surface Area  
 37 kg/m 2 1.95 m 2 Preferred Pharmacy Pharmacy Name Phone [facility]Y [id number] [address] [phone] Your Updated Medication List  
  
   
This list is accurate as of 4/13/18  3:14 PM.  Always use your most recent med list.  
  
  
  
  
 albuterol sulfate 90 mcg/actuation Aepb Commonly known as:  Asaf Sabot Take 2 Puffs by inhalation every four (4) hours as needed. * ALPRAZolam 0.25 mg tablet Commonly known as:  XANAX  
TAKE 1 OR 2 TABLETS THREE TIMES A DAY AS NEEDED * ALPRAZolam 0.25 mg tablet Commonly known as:  Dick Corti Take 1 1/2 tabs daily as needed BENADRYL 25 mg capsule Generic drug:  diphenhydrAMINE Take 50 mg by mouth every six (6) hours as needed. Indications: ALLERGIC REACTIONS  
  
 busPIRone 10 mg tablet Commonly known as:  BUSPAR Take 1 Tab by mouth two (2) times a day. Indications: Generalized Anxiety Disorder  
  
 celecoxib 200 mg capsule Commonly known as:  CELEBREX  
TAKE 1 CAPSULE TWICE A DAY AS NEEDED CLARITIN 10 mg tablet Generic drug:  loratadine Take 10 mg by mouth daily (after lunch). TAKES AS NEEDED  
  
 EPINEPHrine 0.3 mg/0.3 mL injection Commonly known as:  EPIPEN 2-JULIAN  
0.3 mL by IntraMUSCular route once as needed for up to 1 dose. ergocalciferol 50,000 unit capsule Commonly known as:  VITAMIN D2 Take 1 Cap by mouth every seven (7) days. esomeprazole 40 mg capsule Commonly known as:  NexIUM Take 1 Cap by mouth daily. gabapentin 300 mg capsule Commonly known as:  NEURONTIN  
TAKE ONE CAPSULE BY MOUTH EACH NIGHT HYDROcodone-acetaminophen 5-325 mg per tablet Commonly known as:  NORCO  
TAKE 1 TABLET EVERY 8 HOURS AS NEEDED FOR PAIN  
  
 metoprolol succinate 25 mg XL tablet Commonly known as:  TOPROL XL Take 0.5 Tabs by mouth daily. mometasone 50 mcg/actuation nasal spray Commonly known as:  NASONEX  
2 Sprays by Both Nostrils route as needed. naloxone 2 mg/0.4 mL auto-injector Commonly known as:  EVZIO  
0.4 mL by IntraMUSCular route once as needed for Overdose for up to 1 dose. pilocarpine 5 mg tablet Commonly known as:  [place] Take 1 tab 2 times daily  
  
 predniSONE 10 mg dose pack Commonly known as:  STERAPRED DS See administration instruction per 10mg dose pack  
  
 promethazine-dextromethorphan 6.25-15 mg/5 mL syrup Commonly known as:  PROMETHAZINE-DM  
TAKE 5 ML BY MOUTH EVERY SIX (6) HOURS AS NEEDED FOR COUGH.  
  
 tamoxifen 20 mg tablet Commonly known as:  NOLVADEX Take 1 Tab by mouth nightly. zolpidem CR 6.25 mg tablet Commonly known as:  AMBIEN CR  
TAKE 1 TABLET BY MOUTH IN THE EVENING AS NEEDED FOR SLEEP ZyrTEC 10 mg tablet Generic drug:  cetirizine Take 10 mg by mouth nightly. * Notice: This list has 2 medication(s) that are the same as other medications prescribed for you. Read the directions carefully, and ask your doctor or other care provider to review them with you. Prescriptions Printed Refills HYDROcodone-acetaminophen (NORCO) 5-325 mg per tablet 0 Sig: TAKE 1 TABLET EVERY 8 HOURS AS NEEDED FOR PAIN Class: Print ALPRAZolam (XANAX) 0.25 mg tablet 0 Sig: Take 1 1/2 tabs daily as needed Class: Print  
 zolpidem CR (AMBIEN CR) 6.25 mg tablet 0 Sig: TAKE 1 TABLET BY MOUTH IN THE EVENING AS NEEDED FOR SLEEP Class: Print  
 naloxone (EVZIO) 2 mg/0.4 mL auto-injector 0 Si.4 mL by IntraMUSCular route once as needed for Overdose for up to 1 dose. Class: Print Route: IntraMUSCular Prescriptions Sent to Pharmacy Refills EPINEPHrine (EPIPEN 2-JULIAN) 0.3 mg/0.3 mL injection 1 Si.3 mL by IntraMUSCular route once as needed for up to 1 dose. Class: Normal  
 Pharmacy: [facility]/pharmacy [address] [place] [address] [address] Ph #: [phone] Route: IntraMUSCular  
 busPIRone (BUSPAR) 10 mg tablet 6 Sig: Take 1 Tab by mouth two (2) times a day. Indications: Generalized Anxiety Disorder Class: Normal  
 Pharmacy: [facility]/pharmacy [address] [place] [address] [address] Ph #: [phone] Route: Oral  
 predniSONE (STERAPRED DS) 10 mg dose pack 0 Sig: See administration instruction per 10mg dose pack Class: Normal  
 Pharmacy: [facility]/pharmacy [address] [place] [address] [address] Ph #: [phone] We Performed the Following 9-DRUG SCREEN + OXY, UR [id number] CPT(R)] Follow-up Instructions Return in about 3 months (around 2018). Introducing [facility]! Dear JENNIFER: Thank you for requesting a [employer] account. Our records indicate that you already have an active [employer] account. You can access your account anytime at [email or web address] Did you know that you can access your hospital and ER discharge instructions at any time in [employer]? You can also review all of your test results from your hospital stay or ER visit. Additional Information If you have questions, please visit the Frequently Asked Questions section of the [employer] website at [email or web address] Remember, [employer] is NOT to be used for urgent needs. For medical emergencies, dial 911. Now available from your iPhone and Android! Please provide this summary of care documentation to your next provider. Your primary care clinician is listed as Corey Blanchard. If you have any questions after today's visit, please call [phone].

## 2018-04-13 NOTE — PROGRESS NOTES
HISTORY OF PRESENT ILLNESS  Merlin Hamilton is a 47 y.o. female. HPI   Follow up on chronic medical problems. Cardiovascular Review:  The patient has hypertension. Diet and Lifestyle: generally follows a low fat low cholesterol diet, generally follows a low sodium diet, exercises sporadically  Home BP Monitoring: is not measured at home. Pertinent ROS: taking medications as instructed, no medication side effects noted, no TIA's, no chest pain on exertion, no dyspnea on exertion, no swelling of ankles. She has been having period of palpitations and dizziness over the past several months. Feels like her heart is racing. Breaks out into a sweats at times. No chest pain or SOB noted. Sx occurs about 1 to 2 times in a weeks and may last for several minutes. Anxiety Review:  Patient is seen for ADD, sleep disturbance and anxiety. Current treatment includes Xanax but she is willing to try another anti-anxiety medication in place of the xanax  Ongoing symptoms include: insomnia. She is using [place] for sleep. Reported side effects from the treatment: none. Encounter for pain management. 1./2. Medical history/Past medical History  Chronic Pain:  Patient has osteoarthritis in the lower back and suffer with chronic pain in her lower back. Has been taking celebrex twice a day as long as she does not have GI upset. She uses the hydrocodone for more severe pain. Pain ranges from 6-9/10. MRI lumbar spine in 2009 showed DDD then with bulging disc and central radial teat in L4-5. She had laminectomy in 2010 for one disc. She has had multiple steroid epidural injections. Symptoms onset: problem is longstanding. Rheumatological ROS: stable, mild-to-moderate joint symptoms intermittently, reasonably well controlled by PRN meds. Response to treatment plan: stable. 3. Applicable records from prior treatment providers are apart of [facility] under the media tab.   4. Diagnostic, therapeutic and laboratory results are available in the [address] [place] [address] chart. 5. Consultation notes are available for review in the media tab of the [address] [place] [address] chart. 6. Treatment goals include pain control so that the pt may be as active and function with her daily activities and improved comfort level. Previously pt has been limited by pain. 7. The risks and benefits of treatment has been discussed at this office visit with the pt. She understands that the medication has addicting potential.  Additionally the pt has been advised that narcotic pain medication may impair mental and/or physical ability required for performance of tasks such as driving or operating any other machinery. 8. Pt has an updated signed pain contract on file and can be found under the FYI section of the [facility] chart. 9. The pain contract is reviewed. Pain medication will be continued at the previous dosage. Urine drug screening will be done today. Diagnostic studies are not indicated at this time. Interventional procedure are not indicated at this time. 10. Medication prescibed is HYDROcodone-acetaminophen (NORCO) 5-325 mg per tablet .  has been reviewed at this OV by me. 11. Patient instructions have been reviewed in detail as outlined above and in the pain contract. 12. Re-eval is planned for 3 months. Patient Active Problem List   Diagnosis Code    Palpitations R00.2    Depression F32.9    Environmental allergies Z91.09    Breast cancer, stage 1 ([facility] C50.919    GERD (gastroesophageal reflux disease) K21.9    Insomnia G47.00    Essential hypertension I10    ADD (attention deficit disorder) F98.8    Encounter for medication monitoring Z51.81    Severe obesity (BMI 35.0-39. 9) with comorbidity (HCC) E66.01       Current Outpatient Prescriptions   Medication Sig Dispense Refill    promethazine-dextromethorphan (PROMETHAZINE-DM) 6.25-15 mg/5 mL syrup TAKE 5 ML BY MOUTH EVERY SIX (6) HOURS AS NEEDED FOR COUGH. 1    HYDROcodone-acetaminophen (NORCO) 5-325 mg per tablet TAKE 1 TABLET EVERY 8 HOURS AS NEEDED FOR PAIN 60 Tab 0    ALPRAZolam (XANAX) 0.25 mg tablet Take 1 1/2 tabs daily as needed 30 Tab 0    EPINEPHrine (EPIPEN 2-JULIAN) 0.3 mg/0.3 mL injection 0.3 mL by IntraMUSCular route once as needed for up to 1 dose. 0.6 mL 1    zolpidem CR (AMBIEN CR) 6.25 mg tablet TAKE 1 TABLET BY MOUTH IN THE EVENING AS NEEDED FOR SLEEP 90 Tab 0    naloxone (EVZIO) 2 mg/0.4 mL auto-injector 0.4 mL by IntraMUSCular route once as needed for Overdose for up to 1 dose. 0.4 mL 0    busPIRone (BUSPAR) 10 mg tablet Take 1 Tab by mouth two (2) times a day. Indications: Generalized Anxiety Disorder 60 Tab 6    predniSONE (STERAPRED DS) 10 mg dose pack See administration instruction per 10mg dose pack 21 Tab 0    ergocalciferol (VITAMIN D2) 50,000 unit capsule Take 1 Cap by mouth every seven (7) days. 5 Cap 6    mometasone (NASONEX) 50 mcg/actuation nasal spray 2 Sprays by Both Nostrils route as needed. 1 Container 11    albuterol sulfate (PROAIR RESPICLICK) 90 mcg/actuation aepb Take 2 Puffs by inhalation every four (4) hours as needed. 1 Inhaler 0    celecoxib (CELEBREX) 200 mg capsule TAKE 1 CAPSULE TWICE A DAY AS NEEDED  4    pilocarpine (SALAGEN) 5 mg tablet Take 1 tab 2 times daily  2    tamoxifen (NOLVADEX) 20 mg tablet Take 1 Tab by mouth nightly. 90 Tab 3    diphenhydrAMINE (BENADRYL) 25 mg capsule Take 50 mg by mouth every six (6) hours as needed. Indications: ALLERGIC REACTIONS      loratadine (CLARITIN) 10 mg tablet Take 10 mg by mouth daily (after lunch). TAKES AS NEEDED      cetirizine (ZYRTEC) 10 mg tablet Take 10 mg by mouth nightly.  metoprolol succinate (TOPROL XL) 25 mg XL tablet Take 0.5 Tabs by mouth daily. 45 Tab 1    esomeprazole (NEXIUM) 40 mg capsule Take 1 Cap by mouth daily.  90 Cap 3       Allergies   Allergen Reactions    Other Food Other (comments)     PT STATES OTHER FOOD ALLERGIES WITH VARYING REACTIONS:  PORK, LETTUCE, LIMA BEANS, BLACK PEPPER, TEA, MALT, WALNUTS.  Beef Containing Products Anaphylaxis    Food Extracts Anaphylaxis     Beef, milk, peanuts    Milk Anaphylaxis    Peanut Anaphylaxis    Alpha-D-Galactosidase Diarrhea    Azithromycin Nausea and Vomiting    Erythromycin Nausea and Vomiting    Guaifenesin Nausea and Vomiting    Morphine Nausea and Vomiting     Projectile vomiting per patient    Other Medication Other (comments)     WHITE CELL STIMULATOR INJECTION (NEULASTIN?) CAUSED SEVERE BONE PAIN LASTING A COUPLE WEEKS. Past Medical History:   Diagnosis Date    Anemia 10/3/13    \"SINCE I WAS A KID\"    Anxiety     DURING CHEMO; PT STAYS XANAX AS OF 10/3/13    Cancer ([facility] [id number]    breast,chemo ended 10/2012    Chronic pain     si joints sacrum    Depression     Nausea & vomiting     used patch in past with good results    Other ill-defined conditions(799.89)     anemia,chronic diarrhea, TMJ    Ovarian cyst     Palpitations     TAKES METOPROLOL FOR TACHYCARDIA    PUD (peptic ulcer disease)     occurred at age 33 .     Unspecified adverse effect of anesthesia     TMJ       Past Surgical History:   Procedure Laterality Date    BREAST SURGERY PROCEDURE UNLISTED      breast biopsy x3 left breast    HX BREAST RECONSTRUCTION  7/10/2013    BREAST NIPPLE RECONSTRUCTION REVISION performed by Carolann Schaumann, [facility] at [facility] [address] HX BREAST RECONSTRUCTION  2012    REINCISION LEFT BREAST FOR REMAINDER OF BREAST CA    HX BREAST RECONSTRUCTION  2012    R SEROMA EXCISED, REPLACE IMPLANT R BREAST    HX  SECTION  1984    HX GI      anal fissure repair    HX GYN  10/21/13    [facility]/BSO    HX HYSTERECTOMY  10/2013    Dr. Erich Galeas    HX MASTECTOMY  2012    double    HX OTHER SURGICAL      DENTAL SURGERIES WITH SEDATION    HX VASCULAR ACCESS  2012    PORTACATH INSERTION    HX VASCULAR ACCESS  2012    PORTCATH REMOVAL    LAMINOTOMY  2010    AR COLONOSCOPY FLX DX W/COLLJ SPEC WHEN PFRMD  2011    Dr Adams Ill       Family History   Problem Relation Age of Onset    Breast Cancer Mother 64    Cancer Mother 64      OF LUNG CA    Hypertension Father     Diabetes Father     Emphysema Father     COPD Father     Heart Attack Sister 50    Heart Disease Sister     Breast Cancer Sister     No Known Problems Brother     Anxiety Brother     No Known Problems Sister     Breast Cancer Paternal Grandmother     Malignant Hyperthermia Neg Hx     Pseudocholinesterase Deficiency Neg Hx     Delayed Awakening Neg Hx     Post-op Nausea/Vomiting Neg Hx     Emergence Delirium Neg Hx     Post-op Cognitive Dysfunction Neg Hx     Other Neg Hx        Social History   Substance Use Topics    Smoking status: Never Smoker    Smokeless tobacco: Never Used    Alcohol use No        Lab Results  Component Value Date/Time   WBC 5.9 2014 04:16 PM   HGB 12.2 2014 04:16 PM   HCT 37.4 2014 04:16 PM   PLATELET 293  04:16 PM   MCV 85 2014 04:16 PM     Lab Results  Component Value Date/Time   Cholesterol, total 196 2014 09:01 AM   HDL Cholesterol 51 2014 09:01 AM   LDL, calculated 118 (H) 2014 09:01 AM   LDL-C, External 139 2015 01:33 PM   Triglyceride 136 2014 09:01 AM   CHOL/HDL Ratio 3.6 12/15/2009 02:10 PM     Lab Results  Component Value Date/Time   TSH 2.330 2018 05:01 PM   T4, Free 1.05 2011 10:55 AM      Lab Results   Component Value Date/Time    Sodium 141 2018 05:01 PM    Potassium 4.0 2018 05:01 PM    Chloride 101 2018 05:01 PM    CO2 25 2018 05:01 PM    Anion gap 6 10/22/2013 04:40 AM    Glucose 93 2018 05:01 PM    BUN 10 2018 05:01 PM    Creatinine 0.72 2018 05:01 PM    BUN/Creatinine ratio 14 2018 05:01 PM    GFR est  2018 05:01 PM    GFR est non-AA 97 2018 05:01 PM    Calcium 9.2 2018 05:01 PM    Bilirubin, total 0.3 08/27/2014 09:01 AM    ALT (SGPT) 20 08/27/2014 09:01 AM    AST (SGOT) 15 08/27/2014 09:01 AM    Alk. phosphatase 77 08/27/2014 09:01 AM    Protein, total 6.6 08/27/2014 09:01 AM    Albumin 4.3 08/27/2014 09:01 AM    Globulin 3.2 10/03/2013 04:29 PM    A-G Ratio 1.9 08/27/2014 09:01 AM      Lab Results   Component Value Date/Time    Hemoglobin A1c (POC) 5.4% 08/27/2014 09:01 AM    Hemoglobin A1c, External 5.7 05/21/2015 01:33 PM         Review of Systems   Constitutional: Negative for malaise/fatigue. HENT: Negative for congestion. Eyes: Negative for blurred vision. Respiratory: Negative for cough and shortness of breath. Cardiovascular: Negative for chest pain, palpitations and leg swelling. Gastrointestinal: Negative for abdominal pain, constipation and heartburn. Genitourinary: Negative for dysuria, frequency and urgency. Musculoskeletal: Positive for back pain. Negative for joint pain. Neurological: Negative for dizziness, tingling and headaches. Endo/Heme/Allergies: Negative for environmental allergies. Psychiatric/Behavioral: Negative for depression. The patient does not have insomnia. Physical Exam   Constitutional: She appears well-developed and well-nourished. /83 (BP 1 Location: Right arm, BP Patient Position: Sitting)  Pulse 71  Temp 97.6 °F (36.4 °C) (Oral)   Resp 16  Ht 5' 1\" (1.549 m)  Wt 195 lb 12.8 oz (88.8 kg)  LMP 08/03/2012  SpO2 98%  BMI 37 kg/m2   HENT:   Right Ear: Tympanic membrane and ear canal normal.   Left Ear: Tympanic membrane and ear canal normal.   Nose: No mucosal edema or rhinorrhea. Mouth/Throat: Oropharynx is clear and moist and mucous membranes are normal.   Neck: Normal range of motion. Neck supple. No thyromegaly present. Cardiovascular: Normal rate and regular rhythm. No murmur heard. Pulmonary/Chest: Effort normal and breath sounds normal.   Abdominal: Soft. Bowel sounds are normal. There is no tenderness. Musculoskeletal: She exhibits no edema. Lumbar back: She exhibits tenderness, bony tenderness and pain. She exhibits normal range of motion and no spasm. Lymphadenopathy:     She has no cervical adenopathy. Skin: Skin is warm and dry. Psychiatric: She has a normal mood and affect. Nursing note and vitals reviewed. ASSESSMENT and PLAN  Diagnoses and all orders for this visit:    1. Lumbar arthropathy (HCC)  -     HYDROcodone-acetaminophen (NORCO) 5-325 mg per tablet; TAKE 1 TABLET EVERY 8 HOURS AS NEEDED FOR PAIN  -     predniSONE (STERAPRED DS) 10 mg dose pack; See administration instruction per 10mg dose pack    2. Chronic upper back pain  -     XR SPINE THORAC 2 V; Future  -     predniSONE (STERAPRED DS) 10 mg dose pack; See administration instruction per 10mg dose pack    3. Encounter for chronic pain management  -     naloxone (EVZIO) 2 mg/0.4 mL auto-injector; 0.4 mL by IntraMUSCular route once as needed for Overdose for up to 1 dose. -     9-DRUG SCREEN + OXY, UR  The pt has signed medication agreement. Pain contract is reviewed. Pain medications will be continued at the previous dosage. Urine drug screening will be done today. Diagnostic  studies are not indicated at this time. Interventional procedure are not indicated at this time. Re-eval in 3 months. 4. Primary insomnia  -     ALPRAZolam (XANAX) 0.25 mg tablet; Take 1 1/2 tabs daily as needed  -     zolpidem CR (AMBIEN CR) 6.25 mg tablet; TAKE 1 TABLET BY MOUTH IN THE EVENING AS NEEDED FOR SLEEP    5. Essential hypertension  Discussed sodium restriction, high k rich diet, maintaining ideal body weight and regular exercise program such as daily walking 30 min perday 4-5 times per week, as physiologic means to achieve blood pressure control.  Medication compliance advised. 6. Anxiety  Discussed stopping the xanax since she is taking an opioid also. -     Add busPIRone (BUSPAR) 10 mg tablet;  Take 1 Tab by mouth two (2) times a day. Indications: Generalized Anxiety Disorder    7. Depression, unspecified depression type  dermatitis    8. Encounter for medication monitoring    Other orders  -     EPINEPHrine (EPIPEN 2-JULIAN) 0.3 mg/0.3 mL injection; 0.3 mL by IntraMUSCular route once as needed for up to 1 dose. Follow-up Disposition:  Return in about 3 months (around 7/13/2018). reviewed diet, exercise and weight control  cardiovascular risk and specific lipid/LDL goals reviewed  reviewed medications and side effects in detail  specific diabetic recommendations: low cholesterol diet, weight control and daily exercise discussed and glycohemoglobin and other lab monitoring discussed     I have discussed diagnosis listed in this note with pt and/or family. I have discussed treatment plans and options and the risk/benefit analysis of those options, including safe use of medications and possible medication side effects. Through the use of shared decision making we have agreed to the above plan. The patient has received an after-visit summary and questions were answered concerning future plans and follow up. Advise pt of any urgent changes then to proceed to the ER.

## 2018-04-13 NOTE — PROGRESS NOTES
Chief Complaint   Patient presents with    Medication Management     1. Have you been to the ER, urgent care clinic since your last visit? Hospitalized since your last visit? No    2. Have you seen or consulted any other health care providers outside of the [address] [place] [address] since your last visit? Include any pap smears or colon screening.  No

## 2018-04-14 LAB
AMPHETAMINES UR QL SCN: NEGATIVE NG/ML
BARBITURATES UR QL SCN: NEGATIVE NG/ML
BENZODIAZ UR QL SCN: POSITIVE NG/ML
BZE UR QL SCN: NEGATIVE NG/ML
CANNABINOIDS UR QL SCN: NEGATIVE NG/ML
CREAT UR-MCNC: 121.3 MG/DL (ref 20–300)
METHADONE UR QL SCN: NEGATIVE NG/ML
OPIATES UR QL SCN: NEGATIVE NG/ML
OXYCODONE+OXYMORPHONE UR QL SCN: NEGATIVE NG/ML
PCP UR QL SCN: NEGATIVE NG/ML
PH UR: 5.3 [PH] (ref 4.5–8.9)
PLEASE NOTE:, 733163: NORMAL
PROPOXYPH UR QL SCN: NEGATIVE NG/ML

## 2018-04-19 DIAGNOSIS — K21.9 GASTROESOPHAGEAL REFLUX DISEASE WITHOUT ESOPHAGITIS: ICD-10-CM

## 2018-04-19 RX ORDER — METOPROLOL SUCCINATE 25 MG/1
12.5 TABLET, EXTENDED RELEASE ORAL DAILY
Qty: 45 TAB | Refills: 1 | Status: SHIPPED | OUTPATIENT
Start: 2018-04-19 | End: 2018-10-01 | Stop reason: SDUPTHER

## 2018-04-19 RX ORDER — ESOMEPRAZOLE MAGNESIUM 40 MG/1
40 CAPSULE, DELAYED RELEASE ORAL DAILY
Qty: 90 CAP | Refills: 3 | Status: SHIPPED | OUTPATIENT
Start: 2018-04-19 | End: 2018-07-17

## 2018-05-01 DIAGNOSIS — G89.29 CHRONIC MIDLINE LOW BACK PAIN WITHOUT SCIATICA: ICD-10-CM

## 2018-05-01 DIAGNOSIS — M54.50 CHRONIC MIDLINE LOW BACK PAIN WITHOUT SCIATICA: ICD-10-CM

## 2018-05-01 RX ORDER — CELECOXIB 200 MG/1
CAPSULE ORAL
Qty: 60 CAP | Refills: 11 | Status: SHIPPED | OUTPATIENT
Start: 2018-05-01 | End: 2018-06-12 | Stop reason: SDUPTHER

## 2018-05-01 NOTE — TELEPHONE ENCOUNTER
----- Message from Silvia Palomo sent at 5/1/2018 11:53 AM EDT -----  Regarding: /Refill  Pt is requesting a refill on Celebrax (dosage unknown) sent to the [facility] pharmacy on file.

## 2018-05-04 NOTE — TELEPHONE ENCOUNTER
----- Message from Bassam Umanzor sent at 9/1/2017  1:40 PM EDT -----  Regarding: Dr. Milton Vazquez/telephone  The patient is requesting a call back from the nurse with an appointment to be seen today for bronchitis.  [phone]
Pt c/o coughing up yellow sputum and feels like she is getting bronchitis and she had it last year about the same time. Would like something called in if possible. Will check with Dr. Prasanth Chi and call back. Will load Bactrim DS 1 BID x 10 days, promethazine DM cough syrup, and Proair respiclick per Dr. Prasanth Chi. Pt notified.
Yes

## 2018-05-07 ENCOUNTER — DOCUMENTATION ONLY (OUTPATIENT)
Dept: FAMILY MEDICINE CLINIC | Age: 53
End: 2018-05-07

## 2018-05-10 ENCOUNTER — TELEPHONE (OUTPATIENT)
Dept: FAMILY MEDICINE CLINIC | Age: 53
End: 2018-05-10

## 2018-05-10 NOTE — TELEPHONE ENCOUNTER
Called and LM to notify patient Evzio 2 mg /0.4ml was not approved by insurance Dr Jory Barber is aware and do not want to order another medication at this time.

## 2018-05-29 DIAGNOSIS — K21.9 GASTROESOPHAGEAL REFLUX DISEASE WITHOUT ESOPHAGITIS: Primary | ICD-10-CM

## 2018-05-29 RX ORDER — PANTOPRAZOLE SODIUM 40 MG/1
40 TABLET, DELAYED RELEASE ORAL DAILY
Qty: 30 TAB | Refills: 11 | Status: SHIPPED | OUTPATIENT
Start: 2018-05-29 | End: 2019-07-27 | Stop reason: SDUPTHER

## 2018-05-29 NOTE — TELEPHONE ENCOUNTER
Patient's insurance will not cover Nexium but will cover Protonix 40mg,     Will change to Protonix 40mg per Dr. Harrison Stiles. Called patient LM insurance will not cover Nexium but will cover Protonix. Dr. Harrison Stiles will change to Protonix. Any questions please call back.

## 2018-06-08 RX ORDER — AMOXICILLIN 500 MG/1
500 CAPSULE ORAL 3 TIMES DAILY
Qty: 30 CAP | Refills: 0 | Status: SHIPPED | OUTPATIENT
Start: 2018-06-08 | End: 2018-06-18

## 2018-06-08 NOTE — TELEPHONE ENCOUNTER
Called and notified patient  prescribed amoxicillin 500 mg 3 times a day for 10 days and medication will be sent to pharmacy. Patient verbalize understanding.

## 2018-06-12 DIAGNOSIS — G89.29 CHRONIC MIDLINE LOW BACK PAIN WITHOUT SCIATICA: ICD-10-CM

## 2018-06-12 DIAGNOSIS — M54.50 CHRONIC MIDLINE LOW BACK PAIN WITHOUT SCIATICA: ICD-10-CM

## 2018-06-12 RX ORDER — ERGOCALCIFEROL 1.25 MG/1
50000 CAPSULE ORAL
Qty: 5 CAP | Refills: 6 | Status: CANCELLED | OUTPATIENT
Start: 2018-06-12

## 2018-06-12 RX ORDER — CELECOXIB 200 MG/1
CAPSULE ORAL
Qty: 180 CAP | Refills: 3 | Status: SHIPPED | OUTPATIENT
Start: 2018-06-12 | End: 2019-09-08 | Stop reason: SDUPTHER

## 2018-06-12 RX ORDER — ERGOCALCIFEROL 1.25 MG/1
50000 CAPSULE ORAL
Qty: 12 CAP | Refills: 3 | Status: SHIPPED | OUTPATIENT
Start: 2018-06-12 | End: 2018-07-17

## 2018-06-12 NOTE — TELEPHONE ENCOUNTER
[facility] Requests A 90 day supply on the patients behalf. Last Visit: 4/13/1878-Rckcui-Sqhkqg  Next Appt: 7/17/1893-Cwwpzy-Crvojf  Previous Refill Encounter: N/A      Requested Prescriptions     Pending Prescriptions Disp Refills    ergocalciferol (ERGOCALCIFEROL) 50,000 unit capsule 12 Cap 3     Sig: Take 1 Cap by mouth every seven (7) days.     celecoxib (CELEBREX) 200 mg capsule 180 Cap 3     Sig: TAKE 1 CAPSULE TWICE A DAY AS NEEDED

## 2018-06-15 DIAGNOSIS — F41.9 ANXIETY: ICD-10-CM

## 2018-06-15 RX ORDER — BUSPIRONE HYDROCHLORIDE 10 MG/1
10 TABLET ORAL 2 TIMES DAILY
Qty: 180 TAB | Refills: 3 | Status: SHIPPED | OUTPATIENT
Start: 2018-06-15 | End: 2018-07-17

## 2018-07-17 ENCOUNTER — OFFICE VISIT (OUTPATIENT)
Dept: FAMILY MEDICINE CLINIC | Age: 53
End: 2018-07-17

## 2018-07-17 VITALS
SYSTOLIC BLOOD PRESSURE: 128 MMHG | TEMPERATURE: 97.7 F | HEART RATE: 75 BPM | BODY MASS INDEX: 36.4 KG/M2 | HEIGHT: 61 IN | DIASTOLIC BLOOD PRESSURE: 81 MMHG | OXYGEN SATURATION: 95 % | WEIGHT: 192.8 LBS | RESPIRATION RATE: 16 BRPM

## 2018-07-17 DIAGNOSIS — J98.09 BRONCHOSPASTIC AIRWAY DISEASE: ICD-10-CM

## 2018-07-17 DIAGNOSIS — Z13.1 SCREENING FOR DIABETES MELLITUS: ICD-10-CM

## 2018-07-17 DIAGNOSIS — F32.A DEPRESSION, UNSPECIFIED DEPRESSION TYPE: ICD-10-CM

## 2018-07-17 DIAGNOSIS — R05.8 ALLERGIC COUGH: ICD-10-CM

## 2018-07-17 DIAGNOSIS — I10 ESSENTIAL HYPERTENSION: Primary | ICD-10-CM

## 2018-07-17 DIAGNOSIS — Z91.09 ENVIRONMENTAL ALLERGIES: ICD-10-CM

## 2018-07-17 DIAGNOSIS — Z23 NEED FOR SHINGLES VACCINE: ICD-10-CM

## 2018-07-17 DIAGNOSIS — K21.9 GASTROESOPHAGEAL REFLUX DISEASE WITHOUT ESOPHAGITIS: ICD-10-CM

## 2018-07-17 DIAGNOSIS — Z51.81 ENCOUNTER FOR MEDICATION MONITORING: ICD-10-CM

## 2018-07-17 LAB — HBA1C MFR BLD HPLC: 5.8 %

## 2018-07-17 RX ORDER — IPRATROPIUM BROMIDE AND ALBUTEROL SULFATE 2.5; .5 MG/3ML; MG/3ML
3 SOLUTION RESPIRATORY (INHALATION)
Qty: 30 NEBULE | Refills: 0 | Status: CANCELLED
Start: 2018-07-17 | End: 2018-07-17

## 2018-07-17 RX ORDER — PREDNISONE 10 MG/1
10 TABLET ORAL 2 TIMES DAILY
Qty: 10 TAB | Refills: 0 | Status: SHIPPED | OUTPATIENT
Start: 2018-07-17 | End: 2018-07-22

## 2018-07-17 NOTE — PROGRESS NOTES
HISTORY OF PRESENT ILLNESS  Zell Meigs is a 47 y.o. female. HPI   Follow up on chronic medical problems. Cardiovascular Review:  The patient has hypertension. Diet and Lifestyle: generally follows a low fat low cholesterol diet, generally follows a low sodium diet, exercises sporadically  Home BP Monitoring: is not measured at home. Pertinent ROS: taking medications as instructed, no medication side effects noted, no TIA's, no chest pain on exertion, no dyspnea on exertion, no swelling of ankles. Anxiety Review:  Patient is seen for ADD, sleep disturbance and anxiety. Current treatment includes Xanax. Ongoing symptoms include: insomnia. She is using Missy Millers for sleep. Reported side effects from the treatment: none. The patient is being seen for cough and wheezing which seem to be induce by heat and humidity and allergies. She has had increase sx over the past few weeks and needing to use the albuterol inhaler. Symptoms occur: less than x1 in a week. Cough however has been going on for the pat 2-3 months. Wheezing when present is described as mild and easily relieved with rescue bronchodilator. Patient Active Problem List   Diagnosis Code    Palpitations R00.2    Depression F32.9    Environmental allergies Z91.09    Breast cancer, stage 1 ([facility] [address] C50.919    GERD (gastroesophageal reflux disease) K21.9    Insomnia G47.00    Essential hypertension I10    ADD (attention deficit disorder) F98.8    Encounter for medication monitoring Z51.81    Severe obesity (BMI 35.0-39. 9) with comorbidity (HCC) E66.01       Current Outpatient Prescriptions   Medication Sig Dispense Refill    varicella-zoster recombinant, PF, (SHINGRIX) 50 mcg/0.5 mL susr injection 0.5 mL by IntraMUSCular route once for 1 dose. Repeat second dose in 6 months. 0.5 mL 1    predniSONE (DELTASONE) 10 mg tablet Take 1 Tab by mouth two (2) times a day for 5 days.  10 Tab 0    celecoxib (CELEBREX) 200 mg capsule TAKE 1 CAPSULE TWICE A DAY AS NEEDED 180 Cap 3    pantoprazole (PROTONIX) 40 mg tablet Take 1 Tab by mouth daily. 30 Tab 11    metoprolol succinate (TOPROL XL) 25 mg XL tablet Take 0.5 Tabs by mouth daily. 45 Tab 1    HYDROcodone-acetaminophen (NORCO) 5-325 mg per tablet TAKE 1 TABLET EVERY 8 HOURS AS NEEDED FOR PAIN 60 Tab 0    EPINEPHrine (EPIPEN 2-JULIAN) 0.3 mg/0.3 mL injection 0.3 mL by IntraMUSCular route once as needed for up to 1 dose. 0.6 mL 1    zolpidem CR (AMBIEN CR) 6.25 mg tablet TAKE 1 TABLET BY MOUTH IN THE EVENING AS NEEDED FOR SLEEP 90 Tab 0    naloxone (EVZIO) 2 mg/0.4 mL auto-injector 0.4 mL by IntraMUSCular route once as needed for Overdose for up to 1 dose. 0.4 mL 0    ergocalciferol (VITAMIN D2) 50,000 unit capsule Take 1 Cap by mouth every seven (7) days. 5 Cap 6    mometasone (NASONEX) 50 mcg/actuation nasal spray 2 Sprays by Both Nostrils route as needed. 1 Container 11    albuterol sulfate (PROAIR RESPICLICK) 90 mcg/actuation aepb Take 2 Puffs by inhalation every four (4) hours as needed. 1 Inhaler 0    pilocarpine (SALAGEN) 5 mg tablet Take 1 tab 2 times daily  2    tamoxifen (NOLVADEX) 20 mg tablet Take 1 Tab by mouth nightly. 90 Tab 3    diphenhydrAMINE (BENADRYL) 25 mg capsule Take 50 mg by mouth every six (6) hours as needed. Indications: ALLERGIC REACTIONS      loratadine (CLARITIN) 10 mg tablet Take 10 mg by mouth daily (after lunch). TAKES AS NEEDED      cetirizine (ZYRTEC) 10 mg tablet Take 10 mg by mouth nightly. Allergies   Allergen Reactions    Other Food Other (comments)     PT STATES OTHER FOOD ALLERGIES WITH VARYING REACTIONS:  PORK, LETTUCE, LIMA BEANS, BLACK PEPPER, TEA, MALT, WALNUTS.     Beef Containing Products Anaphylaxis    Food Extracts Anaphylaxis     Beef, milk, peanuts    Milk Anaphylaxis    Peanut Anaphylaxis    Alpha-D-Galactosidase Diarrhea    Azithromycin Nausea and Vomiting    Erythromycin Nausea and Vomiting    Guaifenesin Nausea and Vomiting    Morphine Nausea and Vomiting     Projectile vomiting per patient    Other Medication Other (comments)     WHITE CELL STIMULATOR INJECTION (NEULASTIN?) CAUSED SEVERE BONE PAIN LASTING A COUPLE WEEKS. Past Medical History:   Diagnosis Date    Anemia 10/3/13    \"SINCE I WAS A KID\"    Anxiety     DURING CHEMO; PT STAYS XANAX AS OF 10/3/13    Cancer ([facility] [address] [id number]    breast,chemo ended 10/2012    Chronic pain     si joints sacrum    Depression     Nausea & vomiting     used patch in past with good results    Other ill-defined conditions(799.89)     anemia,chronic diarrhea, TMJ    Ovarian cyst     Palpitations     TAKES METOPROLOL FOR TACHYCARDIA    PUD (peptic ulcer disease)     occurred at age 33 .     Unspecified adverse effect of anesthesia     TMJ       Past Surgical History:   Procedure Laterality Date    BREAST SURGERY PROCEDURE UNLISTED      breast biopsy x3 left breast    HX BREAST RECONSTRUCTION  7/10/2013    BREAST NIPPLE RECONSTRUCTION REVISION performed by Lucy Su MD at [facility] MA[facility] OR    HX BREAST RECONSTRUCTION  2012    REINCISION LEFT BREAST FOR REMAINDER OF BREAST CA    HX BREAST RECONSTRUCTION  2012    R SEROMA EXCISED, REPLACE IMPLANT R BREAST    HX  SECTION      HX ENDOSCOPY      [facility].    HX GI      anal fissure repair    HX GYN  10/21/13    TLH/BSO    HX HYSTERECTOMY  10/2013    Dr. Fely Carrillo    HX MASTECTOMY  2012    double    HX OTHER SURGICAL      DENTAL SURGERIES WITH SEDATION    HX VASCULAR ACCESS  2012    PORTACATH INSERTION    HX VASCULAR ACCESS  2012    PORTCATH REMOVAL    LAMINOTOMY  2010    NM COLONOSCOPY FLX DX W/COLLJ SPEC WHEN PFRMD  2011    Dr Mary Barakat       Family History   Problem Relation Age of Onset   [address] [place] [address] Breast Cancer Mother 64    Cancer Mother 64      OF LUNG CA    Hypertension Father     Diabetes Father     Emphysema Father     COPD Father     Heart Attack Sister 50    Heart Disease Sister     Breast Cancer Sister     No Known Problems Brother     Anxiety Brother     No Known Problems Sister     Breast Cancer Paternal Grandmother     Malignant Hyperthermia Neg Hx     Pseudocholinesterase Deficiency Neg Hx     Delayed Awakening Neg Hx     Post-op Nausea/Vomiting Neg Hx     Emergence Delirium Neg Hx     Post-op Cognitive Dysfunction Neg Hx     Other Neg Hx        Social History   Substance Use Topics    Smoking status: Never Smoker    Smokeless tobacco: Never Used    Alcohol use No        Lab Results  Component Value Date/Time   WBC 5.9 01/17/2014 04:16 PM   HGB 12.2 01/17/2014 04:16 PM   HCT 37.4 01/17/2014 04:16 PM   PLATELET 961 96/92/3443 04:16 PM   MCV 85 01/17/2014 04:16 PM     Lab Results  Component Value Date/Time   Cholesterol, total 196 08/27/2014 09:01 AM   HDL Cholesterol 51 08/27/2014 09:01 AM   LDL, calculated 118 (H) 08/27/2014 09:01 AM   LDL-C, External 139 05/21/2015 01:33 PM   Triglyceride 136 08/27/2014 09:01 AM   CHOL/HDL Ratio 3.6 12/15/2009 02:10 PM     Lab Results  Component Value Date/Time   TSH 2.330 01/03/2018 05:01 PM   T4, Free 1.05 03/19/2011 10:55 AM      Lab Results   Component Value Date/Time    Sodium 141 01/03/2018 05:01 PM    Potassium 4.0 01/03/2018 05:01 PM    Chloride 101 01/03/2018 05:01 PM    CO2 25 01/03/2018 05:01 PM    Anion gap 6 10/22/2013 04:40 AM    Glucose 93 01/03/2018 05:01 PM    BUN 10 01/03/2018 05:01 PM    Creatinine 0.72 01/03/2018 05:01 PM    BUN/Creatinine ratio 14 01/03/2018 05:01 PM    GFR est  01/03/2018 05:01 PM    GFR est non-AA 97 01/03/2018 05:01 PM    Calcium 9.2 01/03/2018 05:01 PM    Bilirubin, total 0.3 08/27/2014 09:01 AM    ALT (SGPT) 20 08/27/2014 09:01 AM    AST (SGOT) 15 08/27/2014 09:01 AM    Alk.  phosphatase 77 08/27/2014 09:01 AM    Protein, total 6.6 08/27/2014 09:01 AM    Albumin 4.3 08/27/2014 09:01 AM    Globulin 3.2 10/03/2013 04:29 PM    A-G Ratio 1.9 08/27/2014 09:01 AM      Lab Results   Component Value Date/Time    Hemoglobin A1c (POC) 5.8 07/17/2018 03:50 PM    Hemoglobin A1c, External 5.7 05/21/2015 01:33 PM         Review of Systems   Constitutional: Negative for malaise/fatigue. HENT: Negative for congestion. Eyes: Negative for blurred vision. Respiratory: Negative for cough and shortness of breath. Cardiovascular: Negative for chest pain, palpitations and leg swelling. Gastrointestinal: Negative for abdominal pain, constipation and heartburn. Genitourinary: Negative for dysuria, frequency and urgency. Musculoskeletal: Negative for back pain and joint pain. Neurological: Negative for dizziness, tingling and headaches. Endo/Heme/Allergies: Negative for environmental allergies. Psychiatric/Behavioral: Negative for depression. The patient does not have insomnia. Physical Exam   Constitutional: She appears well-developed and well-nourished. /81 (BP 1 Location: Left arm, BP Patient Position: Sitting)  Pulse 75  Temp 97.7 °F (36.5 °C) (Oral)   Resp 16  Ht 5' 1\" (1.549 m)  Wt 192 lb 12.8 oz (87.5 kg)  LMP 08/03/2012  SpO2 95%   L/min  BMI 36.43 kg/m2   HENT:   Right Ear: Tympanic membrane and ear canal normal.   Left Ear: Tympanic membrane and ear canal normal.   Nose: No mucosal edema or rhinorrhea. Mouth/Throat: Oropharynx is clear and moist and mucous membranes are normal.   Neck: Normal range of motion. Neck supple. No thyromegaly present. Cardiovascular: Normal rate and regular rhythm. No murmur heard. Pulmonary/Chest: Effort normal. She has wheezes (faint anterior chest.  ). Abdominal: Soft. Bowel sounds are normal. There is no tenderness. Musculoskeletal: Normal range of motion. She exhibits no edema. Lymphadenopathy:     She has no cervical adenopathy. Skin: Skin is warm and dry. Psychiatric: She has a normal mood and affect. Nursing note and vitals reviewed.       ASSESSMENT and PLAN  Diagnoses and all orders for this visit:    1. Essential hypertension  Stable   -     LIPID PANEL    2. Depression, unspecified depression type  Stable     3. Environmental allergies//  4. Allergic cough  -     XR CHEST PA LAT; Future    5. Bronchospastic airway disease  -     predniSONE (DELTASONE) 10 mg tablet; Take 1 Tab by mouth two (2) times a day for 5 days. Continue use of the inhaler as needed. 6. Gastroesophageal reflux disease without esophagitis  Stable on protonix     7. Encounter for medication monitoring  -     METABOLIC PANEL, COMPREHENSIVE  -     CBC W/O DIFF    8. Need for shingles vaccine  -     varicella-zoster recombinant, PF, (SHINGRIX) 50 mcg/0.5 mL susr injection; 0.5 mL by IntraMUSCular route once for 1 dose. Repeat second dose in 6 months. 9. Screening for diabetes mellitus  -     AMB POC HEMOGLOBIN A1C    Follow-up Disposition:  Return in about 3 months (around 10/17/2018). reviewed diet, exercise and weight control  cardiovascular risk and specific lipid/LDL goals reviewed  reviewed medications and side effects in detail     I have discussed diagnosis listed in this note with pt and/or family. I have discussed treatment plans and options and the risk/benefit analysis of those options, including safe use of medications and possible medication side effects. Through the use of shared decision making we have agreed to the above plan. The patient has received an after-visit summary and questions were answered concerning future plans and follow up. Advise pt of any urgent changes then to proceed to the ER.

## 2018-07-17 NOTE — MR AVS SNAPSHOT
[facility] 
 
 
 [address] [facility] [address] [id number] 
[phone] Patient: Missy Gomez MRN: [id number] :1965 Visit Information Date & Time Provider Department Dept. Phone Encounter #  
 2018  2:45 PM Arvind FerraroLei [phone] [id number] Follow-up Instructions Return in about 3 months (around 10/17/2018). Upcoming Health Maintenance Date Due FOBT Q 1 YEAR AGE 50-75 2016 Influenza Age 5 to Adult 2018 PAP AKA CERVICAL CYTOLOGY 2018 Pneumococcal 19-64 Highest Risk (3 of 3 - PCV13) 1/3/2019 DTaP/Tdap/Td series (2 - Td) 2023 Allergies as of 2018  Review Complete On: 2018 By: Arvind Ferraro MD  
  
 Severity Noted Reaction Type Reactions Other Food  10/18/2013    Other (comments) PT STATES OTHER FOOD ALLERGIES WITH VARYING REACTIONS:  PORK, LETTUCE, LIMA BEANS, BLACK PEPPER, TEA, MALT, WALNUTS. Beef Containing Products High 2012   Systemic Anaphylaxis Food Extracts High 04/10/2012    Anaphylaxis Beef, milk, peanuts Milk High 2012   Systemic Anaphylaxis Peanut High 2012   Systemic Anaphylaxis Alpha-d-galactosidase  2014    Diarrhea Azithromycin  2011   Side Effect Nausea and Vomiting Erythromycin  2010    Nausea and Vomiting Guaifenesin  2016    Nausea and Vomiting Morphine  10/25/2010    Nausea and Vomiting Projectile vomiting per patient Other Medication  10/03/2013    Other (comments) WHITE CELL STIMULATOR INJECTION (NEULASTIN?) CAUSED SEVERE BONE PAIN LASTING A COUPLE WEEKS. Current Immunizations  Reviewed on 2018 Name Date Influenza Vaccine 9/10/2017, 10/31/2016, 2015 Influenza Vaccine Split 10/20/2010 Tdap 2013  Reviewed by Arvind Ferraro MD on 2018 at  3:17 PM  
 You Were Diagnosed With   
  
 Codes Comments Essential hypertension    -  Primary ICD-10-CM: I10 
ICD-9-CM: 401.9 Depression, unspecified depression type     ICD-10-CM: F32.9 ICD-9-CM: 406 Environmental allergies     ICD-10-CM: Z91.09 
ICD-9-CM: V15.09 Gastroesophageal reflux disease without esophagitis     ICD-10-CM: K21.9 ICD-9-CM: 530.81 Encounter for medication monitoring     ICD-10-CM: Z51.81 
ICD-9-CM: V58.83 Need for shingles vaccine     ICD-10-CM: W23 ICD-9-CM: V04.89 Screening for diabetes mellitus     ICD-10-CM: Z13.1 ICD-9-CM: V77.1 Allergic cough     ICD-10-CM: R05 ICD-9-CM: 786.2 HX: breast cancer     ICD-10-CM: Z85.3 ICD-9-CM: V10.3 Cough     ICD-10-CM: R05 ICD-9-CM: 575. 2 Vitals BP Pulse Temp Resp Height(growth percentile) Weight(growth percentile) 128/81 (BP 1 Location: Left arm, BP Patient Position: Sitting) 75 97.7 °F (36.5 °C) (Oral) 16 5' 1\" (1.549 m) 192 lb 12.8 oz (87.5 kg) LMP SpO2 PF BMI OB Status Smoking Status 08/03/2012 95% 400 L/min 36.43 kg/m2 Hysterectomy Never Smoker Vitals History BMI and BSA Data Body Mass Index Body Surface Area  
 36.43 kg/m 2 1.94 m 2 Preferred Pharmacy Pharmacy Name Phone [facility]ACY [facility][id number], [place] - [address] [phone] Your Updated Medication List  
  
   
This list is accurate as of 7/17/18  4:25 PM.  Always use your most recent med list.  
  
  
  
  
 albuterol sulfate 90 mcg/actuation Aepb Commonly known as:  Manda Fanti Take 2 Puffs by inhalation every four (4) hours as needed. ALPRAZolam 0.25 mg tablet Commonly known as:  Rayray Beecham Take 1 1/2 tabs daily as needed BENADRYL 25 mg capsule Generic drug:  diphenhydrAMINE Take 50 mg by mouth every six (6) hours as needed. Indications: ALLERGIC REACTIONS  
  
 celecoxib 200 mg capsule Commonly known as:  CELEBREX TAKE 1 CAPSULE TWICE A DAY AS NEEDED CLARITIN 10 mg tablet Generic drug:  loratadine Take 10 mg by mouth daily (after lunch). TAKES AS NEEDED  
  
 EPINEPHrine 0.3 mg/0.3 mL injection Commonly known as:  EPIPEN 2-JULIAN  
0.3 mL by IntraMUSCular route once as needed for up to 1 dose. ergocalciferol 50,000 unit capsule Commonly known as:  VITAMIN D2 Take 1 Cap by mouth every seven (7) days. HYDROcodone-acetaminophen 5-325 mg per tablet Commonly known as:  NORCO  
TAKE 1 TABLET EVERY 8 HOURS AS NEEDED FOR PAIN  
  
 metoprolol succinate 25 mg XL tablet Commonly known as:  TOPROL XL Take 0.5 Tabs by mouth daily. mometasone 50 mcg/actuation nasal spray Commonly known as:  NASONEX  
2 Sprays by Both Nostrils route as needed. naloxone 2 mg/0.4 mL auto-injector Commonly known as:  EVZIO  
0.4 mL by IntraMUSCular route once as needed for Overdose for up to 1 dose. pantoprazole 40 mg tablet Commonly known as:  PROTONIX Take 1 Tab by mouth daily. pilocarpine 5 mg tablet Commonly known as:  Khadijah Winthrop Take 1 tab 2 times daily  
  
 predniSONE 10 mg tablet Commonly known as:  Renee Aver Take 1 Tab by mouth two (2) times a day for 5 days. tamoxifen 20 mg tablet Commonly known as:  NOLVADEX Take 1 Tab by mouth nightly. varicella-zoster recombinant (PF) 50 mcg/0.5 mL Susr injection Commonly known as:  SHINGRIX  
0.5 mL by IntraMUSCular route once for 1 dose. Repeat second dose in 6 months. zolpidem CR 6.25 mg tablet Commonly known as:  AMBIEN CR  
TAKE 1 TABLET BY MOUTH IN THE EVENING AS NEEDED FOR SLEEP ZyrTEC 10 mg tablet Generic drug:  cetirizine Take 10 mg by mouth nightly. Prescriptions Printed Refills  
 varicella-zoster recombinant, PF, (SHINGRIX) 50 mcg/0.5 mL susr injection 1 Si.5 mL by IntraMUSCular route once for 1 dose. Repeat second dose in 6 months. Class: Print Route: IntraMUSCular Prescriptions Sent to Pharmacy Refills  
 predniSONE (DELTASONE) 10 mg tablet 0 Sig: Take 1 Tab by mouth two (2) times a day for 5 days. Class: Normal  
 Pharmacy: [facility]/pharmacy [id number] [address]  #: [phone] Route: Oral  
  
We Performed the Following AMB POC HEMOGLOBIN A1C [40874 CPT(R)] CBC W/O DIFF [38460 CPT(R)] LIPID PANEL [49277 CPT(R)] METABOLIC PANEL, COMPREHENSIVE [47808 CPT(R)] Follow-up Instructions Return in about 3 months (around 10/17/2018). To-Do List   
 07/17/2018 Imaging:  XR CHEST PA LAT Introducing [address]! Dear Morgan Black: Thank you for requesting a [employer] account. Our records indicate that you already have an active [employer] account. You can access your account anytime at [email or web address] Did you know that you can access your hospital and ER discharge instructions at any time in [employer]? You can also review all of your test results from your hospital stay or ER visit. Additional Information If you have questions, please visit the Frequently Asked Questions section of the [employer] website at [email or web address]. Remember, [employer] is NOT to be used for urgent needs. For medical emergencies, dial 911. Now available from your iPhone and Android! Please provide this summary of care documentation to your next provider. Your primary care clinician is listed as Keisha Beck. If you have any questions after today's visit, please call [phone].

## 2018-07-17 NOTE — PROGRESS NOTES
Chief Complaint   Patient presents with    Hypertension     3m f/u     1. Have you been to the ER, urgent care clinic since your last visit? Hospitalized since your last visit? No    2. Have you seen or consulted any other health care providers outside of the [address] [place] [address] since your last visit? Include any pap smears or colon screening. No    Pt c/o slight cough x 8 months.

## 2018-07-18 LAB
ALBUMIN SERPL-MCNC: 4.5 G/DL (ref 3.5–5.5)
ALBUMIN/GLOB SERPL: 1.7 {RATIO} (ref 1.2–2.2)
ALP SERPL-CCNC: 109 IU/L (ref 39–117)
ALT SERPL-CCNC: 35 IU/L (ref 0–32)
AST SERPL-CCNC: 23 IU/L (ref 0–40)
BILIRUB SERPL-MCNC: 0.4 MG/DL (ref 0–1.2)
BUN SERPL-MCNC: 8 MG/DL (ref 6–24)
BUN/CREAT SERPL: 12 (ref 9–23)
CALCIUM SERPL-MCNC: 9.7 MG/DL (ref 8.7–10.2)
CHLORIDE SERPL-SCNC: 103 MMOL/L (ref 96–106)
CHOLEST SERPL-MCNC: 221 MG/DL (ref 100–199)
CO2 SERPL-SCNC: 21 MMOL/L (ref 20–29)
CREAT SERPL-MCNC: 0.69 MG/DL (ref 0.57–1)
ERYTHROCYTE [DISTWIDTH] IN BLOOD BY AUTOMATED COUNT: 15.2 % (ref 12.3–15.4)
GLOBULIN SER CALC-MCNC: 2.6 G/DL (ref 1.5–4.5)
GLUCOSE SERPL-MCNC: 92 MG/DL (ref 65–99)
HCT VFR BLD AUTO: 38.7 % (ref 34–46.6)
HDLC SERPL-MCNC: 42 MG/DL
HGB BLD-MCNC: 12.5 G/DL (ref 11.1–15.9)
INTERPRETATION, 910389: NORMAL
LDLC SERPL CALC-MCNC: 148 MG/DL (ref 0–99)
MCH RBC QN AUTO: 25.9 PG (ref 26.6–33)
MCHC RBC AUTO-ENTMCNC: 32.3 G/DL (ref 31.5–35.7)
MCV RBC AUTO: 80 FL (ref 79–97)
PLATELET # BLD AUTO: 273 X10E3/UL (ref 150–379)
POTASSIUM SERPL-SCNC: 4.2 MMOL/L (ref 3.5–5.2)
PROT SERPL-MCNC: 7.1 G/DL (ref 6–8.5)
RBC # BLD AUTO: 4.83 X10E6/UL (ref 3.77–5.28)
SODIUM SERPL-SCNC: 141 MMOL/L (ref 134–144)
TRIGL SERPL-MCNC: 153 MG/DL (ref 0–149)
VLDLC SERPL CALC-MCNC: 31 MG/DL (ref 5–40)
WBC # BLD AUTO: 6.4 X10E3/UL (ref 3.4–10.8)

## 2018-08-01 DIAGNOSIS — F51.01 PRIMARY INSOMNIA: ICD-10-CM

## 2018-08-01 RX ORDER — ZOLPIDEM TARTRATE 6.25 MG/1
TABLET, FILM COATED, EXTENDED RELEASE ORAL
Qty: 90 TAB | Refills: 0 | OUTPATIENT
Start: 2018-08-01 | End: 2018-10-22 | Stop reason: SDUPTHER

## 2018-08-01 NOTE — TELEPHONE ENCOUNTER
Last Visit: 7/1794-Habfix-Ugpthe  Next Appt: 10/96-Usgxby-Vbkhhl  Previous Refill Encounter: 4/13-90+0    Requested Prescriptions     Pending Prescriptions Disp Refills    zolpidem CR (AMBIEN CR) 6.25 mg tablet 90 Tab 0     Sig: TAKE 1 TABLET BY MOUTH IN THE EVENING AS NEEDED FOR SLEEP

## 2018-09-06 DIAGNOSIS — M47.816 LUMBAR ARTHROPATHY: ICD-10-CM

## 2018-09-06 NOTE — TELEPHONE ENCOUNTER
/Print    Last Visit: 7/1747-Fshijn-Ugicap  Next Appt: 10/91-Clfdzx-Djtmyh  Previous Refill Encounter: 4/13-60+0    Requested Prescriptions     Pending Prescriptions Disp Refills    HYDROcodone-acetaminophen (NORCO) 5-325 mg per tablet 60 Tab 0     Sig: TAKE 1 TABLET EVERY 8 HOURS AS NEEDED FOR PAIN

## 2018-09-06 NOTE — TELEPHONE ENCOUNTER
From: Jose Mullen  To: Claude Combe, MD  Sent: 9/6/2018 4:16 PM EDT  Subject: Medication Renewal Request    Original authorizing provider: Claude Combe, MD    [facility] would like a refill of the following medications:  HYDROcodone-acetaminophen (Sergei Ill) 5-325 mg per tablet Claude Combe, MD]    Preferred pharmacy: [facility], [address] [place] [address] ROAD    Comment:

## 2018-09-07 RX ORDER — HYDROCODONE BITARTRATE AND ACETAMINOPHEN 5; 325 MG/1; MG/1
TABLET ORAL
Qty: 60 TAB | Refills: 0 | Status: SHIPPED | OUTPATIENT
Start: 2018-09-07 | End: 2018-10-22 | Stop reason: SDUPTHER

## 2018-09-07 RX ORDER — ERGOCALCIFEROL 1.25 MG/1
50000 CAPSULE ORAL
Qty: 13 CAP | Refills: 3 | Status: SHIPPED | OUTPATIENT
Start: 2018-09-07 | End: 2019-05-20

## 2018-09-07 NOTE — TELEPHONE ENCOUNTER
Last Visit: 7/1792-Uaqjtt-Khjiez  Next Appt: 10/15-Vgmfuh-Zzrgnv  Previous Refill Encounter: 1/5-5+6    Requested Prescriptions     Pending Prescriptions Disp Refills    ergocalciferol (VITAMIN D2) 50,000 unit capsule 13 Cap 3     Sig: Take 1 Cap by mouth every seven (7) days.

## 2018-10-01 RX ORDER — METOPROLOL SUCCINATE 25 MG/1
12.5 TABLET, EXTENDED RELEASE ORAL DAILY
Qty: 45 TAB | Refills: 3 | Status: SHIPPED | OUTPATIENT
Start: 2018-10-01 | End: 2019-04-22

## 2018-10-01 NOTE — TELEPHONE ENCOUNTER
Last Visit: 7/17  Next Appt: 10/22  Previous Refill Encounter: 4/19-45+1    Requested Prescriptions     Pending Prescriptions Disp Refills    metoprolol succinate (TOPROL XL) 25 mg XL tablet 45 Tab 3     Sig: Take 0.5 Tabs by mouth daily.

## 2018-10-22 ENCOUNTER — OFFICE VISIT (OUTPATIENT)
Dept: FAMILY MEDICINE CLINIC | Age: 53
End: 2018-10-22

## 2018-10-22 VITALS
OXYGEN SATURATION: 98 % | SYSTOLIC BLOOD PRESSURE: 124 MMHG | HEIGHT: 61 IN | WEIGHT: 197.6 LBS | TEMPERATURE: 96.8 F | HEART RATE: 72 BPM | DIASTOLIC BLOOD PRESSURE: 78 MMHG | BODY MASS INDEX: 37.31 KG/M2 | RESPIRATION RATE: 16 BRPM

## 2018-10-22 DIAGNOSIS — M47.816 LUMBAR ARTHROPATHY: ICD-10-CM

## 2018-10-22 DIAGNOSIS — G89.29 CHRONIC UPPER BACK PAIN: ICD-10-CM

## 2018-10-22 DIAGNOSIS — Z51.81 ENCOUNTER FOR MEDICATION MONITORING: ICD-10-CM

## 2018-10-22 DIAGNOSIS — I10 ESSENTIAL HYPERTENSION: Primary | ICD-10-CM

## 2018-10-22 DIAGNOSIS — F41.9 ANXIETY: Primary | ICD-10-CM

## 2018-10-22 DIAGNOSIS — F51.01 PRIMARY INSOMNIA: ICD-10-CM

## 2018-10-22 DIAGNOSIS — Z01.89 ENCOUNTER FOR PAIN MANAGEMENT PLANNING: ICD-10-CM

## 2018-10-22 DIAGNOSIS — E66.9 NON MORBID OBESITY: ICD-10-CM

## 2018-10-22 DIAGNOSIS — M54.9 CHRONIC UPPER BACK PAIN: ICD-10-CM

## 2018-10-22 DIAGNOSIS — Z91.09 ENVIRONMENTAL ALLERGIES: ICD-10-CM

## 2018-10-22 DIAGNOSIS — F41.9 ANXIETY: ICD-10-CM

## 2018-10-22 RX ORDER — DESONIDE 0.5 MG/ML
LOTION TOPICAL
Qty: 59 ML | Refills: 0 | Status: SHIPPED | OUTPATIENT
Start: 2018-10-22

## 2018-10-22 RX ORDER — ZOLPIDEM TARTRATE 6.25 MG/1
TABLET, FILM COATED, EXTENDED RELEASE ORAL
Qty: 90 TAB | Refills: 0 | Status: SHIPPED | OUTPATIENT
Start: 2018-10-22 | End: 2019-01-17 | Stop reason: SDUPTHER

## 2018-10-22 RX ORDER — HYDROCODONE BITARTRATE AND ACETAMINOPHEN 5; 325 MG/1; MG/1
TABLET ORAL
Qty: 60 TAB | Refills: 0 | Status: SHIPPED | OUTPATIENT
Start: 2018-10-22 | End: 2018-12-12 | Stop reason: SDUPTHER

## 2018-10-22 NOTE — PATIENT INSTRUCTIONS
Healthy Upper Back: Exercises  Your Care Instructions  Here are some examples of exercises for your upper back. Start each exercise slowly. Ease off the exercise if you start to have pain. Your doctor or physical therapist will tell you when you can start these exercises and which ones will work best for you. How to do the exercises  Lower neck and upper back stretch    1. Stretch your arms out in front of your body. Clasp one hand on top of your other hand. 2. Gently reach out so that you feel your shoulder blades stretching away from each other. 3. Gently bend your head forward. 4. Hold for 15 to 30 seconds. 5. Repeat 2 to 4 times. Midback stretch    1. Kneel on the floor, and sit back on your ankles. 2. Lean forward, place your hands on the floor, and stretch your arms out in front of you. Rest your head between your arms. 3. Gently push your chest toward the floor, reaching as far in front of you as possible. 4. Hold for 15 to 30 seconds. 5. Repeat 2 to 4 times. Shoulder rolls    1. Sit comfortably with your feet shoulder-width apart. You can also do this exercise while standing. 2. Roll your shoulders up, then back, and then down in a smooth, circular motion. 3. Repeat 2 to 4 times. Wall push-up    1. Stand against a wall with your feet about 12 to 24 inches back from the wall. If you feel any pain when you do this exercise, stand closer to the wall. 2. Place your hands on the wall slightly wider apart than your shoulders, and lean forward. 3. Gently lean your body toward the wall. Then push back to your starting position. Keep the motion smooth and controlled. 4. Repeat 8 to 12 times. Resisted shoulder blade squeeze    1. Sit or stand, holding the band in both hands in front of you. Keep your elbows close to your sides, bent at a 90-degree angle. Your palms should face up. 2. Squeeze your shoulder blades together, and move your arms to the outside, stretching the band.  Be sure to keep your elbows at your sides while you do this. 3. Relax. 4. Repeat 8 to 12 times. Resisted rows    1. Put the band around a solid object, such as a bedpost, at about waist level. Hold one end of the band in each hand. 2. With your elbows at your sides and bent to 90 degrees, pull the band back to move your shoulder blades toward each other. Return to the starting position. 3. Repeat 8 to 12 times. Follow-up care is a key part of your treatment and safety. Be sure to make and go to all appointments, and call your doctor if you are having problems. It's also a good idea to know your test results and keep a list of the medicines you take. Where can you learn more? Go to http://linn-eboni.info/. Enter P998 in the search box to learn more about \"Healthy Upper Back: Exercises. \"  Current as of: November 29, 2017  Content Version: 11.8  © 0155-4305 [employer]. Care instructions adapted under license by [employer] (which disclaims liability or warranty for this information). If you have questions about a medical condition or this instruction, always ask your healthcare professional. [address] any warranty or liability for your use of this information.

## 2018-10-22 NOTE — PROGRESS NOTES
Chief Complaint   Patient presents with    Hypertension     3 month follow up     1. Have you been to the ER, urgent care clinic since your last visit? Hospitalized since your last visit? No    2. Have you seen or consulted any other health care providers outside of the [address] [place] [address] since your last visit? Include any pap smears or colon screening. No    Requesting to restart xanax, taking flight in November , Stopped taking buspar -does not help.

## 2018-10-22 NOTE — PROGRESS NOTES
HISTORY OF PRESENT ILLNESS  Niyah Huizar is a 47 y.o. female. Follow up on chronic medical problems. Cardiovascular Review:  The patient has hypertension. Diet and Lifestyle: generally follows a low fat low cholesterol diet, generally follows a low sodium diet, exercises sporadically  Home BP Monitoring: is not measured at home. Pertinent ROS: taking medications as instructed, no medication side effects noted, no TIA's, no chest pain on exertion, no dyspnea on exertion, no swelling of ankles. Anxiety Review:  Patient is seen for ADD, sleep disturbance and anxiety. Current treatment includes Xanax. Ongoing symptoms include: insomnia. She is using [place] for sleep. Reported side effects from the treatment: none. Encounter for pain management. 1./2. Medical history/Past medical History  Chronic Pain:  Patient has osteoarthritis in the lower back and suffer with chronic pain in her lower back. Has been taking celebrex twice a day as long as she does not have GI upset. She uses the hydrocodone for more severe pain. Pain ranges from 6-9/10. MRI lumbar spine in 2009 showed DDD then with bulging disc and central radial teat in L4-5. She had laminectomy in 2010 for one disc. She has had multiple steroid epidural injections. She is still having some upper back pain that comes and goes. She did not get xray done from last visit. Symptoms onset: problem is longstanding. Rheumatological ROS: stable, mild-to-moderate joint symptoms intermittently, reasonably well controlled by PRN meds. Response to treatment plan: stable. 3. Applicable records from prior treatment providers are apart of [facility] under the media tab. 4. Diagnostic, therapeutic and laboratory results are available in the [address] [place] [address] chart. 5. Consultation notes are available for review in the media tab of the [address] [place] [address] chart.   6. Treatment goals include pain control so that the pt may be as active and function with her daily activities and improved comfort level. Previously pt has been limited by pain. 7. The risks and benefits of treatment has been discussed at this office visit with the pt. She understands that the medication has addicting potential.  Additionally the pt has been advised that narcotic pain medication may impair mental and/or physical ability required for performance of tasks such as driving or operating any other machinery. 8. Pt has an updated signed pain contract on file and can be found under the FYI section of the [facility] chart. 9. The pain contract is reviewed. Pain medication will be continued at the previous dosage. Urine drug screening will be done today. Diagnostic studies are not indicated at this time. Interventional procedure are not indicated at this time. 10. Medication prescibed is HYDROcodone-acetaminophen (NORCO) 5-325 mg per tablet .  has been reviewed at this OV by me. 11. Patient instructions have been reviewed in detail as outlined above and in the pain contract. 12. Re-eval is planned for 3 months. Patient Active Problem List   Diagnosis Code    Palpitations R00.2    Depression F32.9    Environmental allergies Z91.09    Breast cancer, stage 1 ([facility] [address] C50.919    GERD (gastroesophageal reflux disease) K21.9    Insomnia G47.00    Essential hypertension I10    ADD (attention deficit disorder) F98.8    Encounter for medication monitoring Z51.81    Severe obesity (BMI 35.0-39. 9) with comorbidity (HCC) E66.01       Current Outpatient Medications   Medication Sig Dispense Refill    HYDROcodone-acetaminophen (NORCO) 5-325 mg per tablet TAKE 1 TABLET EVERY 8 HOURS AS NEEDED FOR PAIN 60 Tab 0    zolpidem CR (AMBIEN CR) 6.25 mg tablet TAKE 1 TABLET BY MOUTH IN THE EVENING AS NEEDED FOR SLEEP 90 Tab 0    desonide (TRIDESILON) 0.05 % topical lotion Apply  to affected area two (2) times daily as needed for Skin Irritation.  59 mL 0    metoprolol succinate (TOPROL XL) 25 mg XL tablet Take 0.5 Tabs by mouth daily. 45 Tab 3    ergocalciferol (VITAMIN D2) 50,000 unit capsule Take 1 Cap by mouth every seven (7) days. 13 Cap 3    celecoxib (CELEBREX) 200 mg capsule TAKE 1 CAPSULE TWICE A DAY AS NEEDED 180 Cap 3    pantoprazole (PROTONIX) 40 mg tablet Take 1 Tab by mouth daily. 30 Tab 11    EPINEPHrine (EPIPEN 2-JULIAN) 0.3 mg/0.3 mL injection 0.3 mL by IntraMUSCular route once as needed for up to 1 dose. 0.6 mL 1    mometasone (NASONEX) 50 mcg/actuation nasal spray 2 Sprays by Both Nostrils route as needed. 1 Container 11    albuterol sulfate (PROAIR RESPICLICK) 90 mcg/actuation aepb Take 2 Puffs by inhalation every four (4) hours as needed. 1 Inhaler 0    pilocarpine (SALAGEN) 5 mg tablet Take 1 tab 2 times daily  2    diphenhydrAMINE (BENADRYL) 25 mg capsule Take 50 mg by mouth every six (6) hours as needed. Indications: ALLERGIC REACTIONS      loratadine (CLARITIN) 10 mg tablet Take 10 mg by mouth daily (after lunch). TAKES AS NEEDED      cetirizine (ZYRTEC) 10 mg tablet Take 10 mg by mouth nightly. Allergies   Allergen Reactions    Other Food Other (comments)     PT STATES OTHER FOOD ALLERGIES WITH VARYING REACTIONS:  PORK, LETTUCE, LIMA BEANS, BLACK PEPPER, TEA, MALT, WALNUTS.  Beef Containing Products Anaphylaxis    Food Extracts Anaphylaxis     Beef, milk, peanuts    Milk Anaphylaxis    Peanut Anaphylaxis    Alpha-D-Galactosidase Diarrhea    Azithromycin Nausea and Vomiting    Erythromycin Nausea and Vomiting    Guaifenesin Nausea and Vomiting    Morphine Nausea and Vomiting     Projectile vomiting per patient    Other Medication Other (comments)     WHITE CELL STIMULATOR INJECTION (NEULASTIN?) CAUSED SEVERE BONE PAIN LASTING A COUPLE WEEKS.          Past Medical History:   Diagnosis Date    Anemia 10/3/13    \"SINCE I WAS A KID\"    Anxiety     DURING CHEMO; PT STAYS XANAX AS OF 10/3/13    Cancer ([facility] [id number] breast,chemo ended 10/2012    Chronic pain     si joints sacrum    Depression     Nausea & vomiting     used patch in past with good results    Other ill-defined conditions(799.89)     anemia,chronic diarrhea, TMJ    Ovarian cyst     Palpitations     TAKES METOPROLOL FOR TACHYCARDIA    PUD (peptic ulcer disease)     occurred at age 33 .     Unspecified adverse effect of anesthesia     TMJ         Past Surgical History:   Procedure Laterality Date    BREAST SURGERY PROCEDURE UNLISTED      breast biopsy x3 left breast    HX BREAST RECONSTRUCTION  2012    REINCISION LEFT BREAST FOR REMAINDER OF BREAST CA    HX BREAST RECONSTRUCTION  2012    R SEROMA EXCISED, REPLACE IMPLANT R BREAST    HX  SECTION  1984    HX ENDOSCOPY      [facility] HX GI      anal fissure repair    HX GYN  10/21/13    [facility]/BSO    HX HYSTERECTOMY  10/2013    Dr. David Waller    HX MASTECTOMY  2012    double    HX OTHER SURGICAL      DENTAL SURGERIES WITH SEDATION    HX VASCULAR ACCESS  2012    PORTACATH INSERTION    HX VASCULAR ACCESS  2012    PORTCATH REMOVAL    LAMINOTOMY  2010    [place] COLONOSCOPY FLX DX W/COLLJ SPEC WHEN PFRMD  2011    Dr Lan Ron         Family History   Problem Relation Age of Onset    Breast Cancer Mother 64    Cancer Mother 64         OF LUNG CA    Hypertension Father     Diabetes Father     Emphysema Father     COPD Father     Heart Attack Sister 50    Heart Disease Sister     Breast Cancer Sister     No Known Problems Brother     Anxiety Brother     No Known Problems Sister     Breast Cancer Paternal Grandmother     Malignant Hyperthermia Neg Hx     Pseudocholinesterase Deficiency Neg Hx     Delayed Awakening Neg Hx     Post-op Nausea/Vomiting Neg Hx     Emergence Delirium Neg Hx     Post-op Cognitive Dysfunction Neg Hx     Other Neg Hx        Social History     Tobacco Use    Smoking status: Never Smoker    Smokeless tobacco: Never Used   Substance Use Topics    Alcohol use: No     Alcohol/week: 0.0 oz        Lab Results   Component Value Date/Time    WBC 6.4 07/17/2018 03:50 PM    HGB 12.5 07/17/2018 03:50 PM    HCT 38.7 07/17/2018 03:50 PM    PLATELET 271 04/36/4935 03:50 PM    MCV 80 07/17/2018 03:50 PM     Lab Results   Component Value Date/Time    Cholesterol, total 221 (H) 07/17/2018 03:50 PM    HDL Cholesterol 42 07/17/2018 03:50 PM    LDL, calculated 148 (H) 07/17/2018 03:50 PM    LDL-C, External 139 05/21/2015 01:33 PM    Triglyceride 153 (H) 07/17/2018 03:50 PM    CHOL/HDL Ratio 3.6 12/15/2009 02:10 PM     Lab Results   Component Value Date/Time    TSH 2.330 01/03/2018 05:01 PM    T4, Free 1.05 03/19/2011 10:55 AM      Lab Results   Component Value Date/Time    Sodium 141 07/17/2018 03:50 PM    Potassium 4.2 07/17/2018 03:50 PM    Chloride 103 07/17/2018 03:50 PM    CO2 21 07/17/2018 03:50 PM    Anion gap 6 10/22/2013 04:40 AM    Glucose 92 07/17/2018 03:50 PM    BUN 8 07/17/2018 03:50 PM    Creatinine 0.69 07/17/2018 03:50 PM    BUN/Creatinine ratio 12 07/17/2018 03:50 PM    GFR est  07/17/2018 03:50 PM    GFR est non- 07/17/2018 03:50 PM    Calcium 9.7 07/17/2018 03:50 PM    Bilirubin, total 0.4 07/17/2018 03:50 PM    ALT (SGPT) 35 (H) 07/17/2018 03:50 PM    AST (SGOT) 23 07/17/2018 03:50 PM    Alk. phosphatase 109 07/17/2018 03:50 PM    Protein, total 7.1 07/17/2018 03:50 PM    Albumin 4.5 07/17/2018 03:50 PM    Globulin 3.2 10/03/2013 04:29 PM    A-G Ratio 1.7 07/17/2018 03:50 PM      Lab Results   Component Value Date/Time    Hemoglobin A1c (POC) 5.8 07/17/2018 03:50 PM    Hemoglobin A1c, External 5.7 05/21/2015 01:33 PM         Review of Systems   Constitutional: Negative for malaise/fatigue. HENT: Negative for congestion. Eyes: Negative for blurred vision. Respiratory: Negative for cough and shortness of breath. Cardiovascular: Negative for chest pain, palpitations and leg swelling. Gastrointestinal: Negative for abdominal pain, constipation and heartburn. Genitourinary: Negative for dysuria, frequency and urgency. Musculoskeletal: Positive for back pain. Negative for joint pain. Neurological: Negative for dizziness, tingling and headaches. Endo/Heme/Allergies: Negative for environmental allergies. Psychiatric/Behavioral: Negative for depression. The patient does not have insomnia. Physical Exam   Constitutional: She appears well-developed and well-nourished. /81 (BP 1 Location: Left arm, BP Patient Position: Sitting)  Pulse 75  Temp 97.7 °F (36.5 °C) (Oral)   Resp 16  Ht 5' 1\" (1.549 m)  Wt 192 lb 12.8 oz (87.5 kg)  LMP 08/03/2012  SpO2 95%   L/min  BMI 36.43 kg/m2   HENT:   Right Ear: Tympanic membrane and ear canal normal.   Left Ear: Tympanic membrane and ear canal normal.   Nose: No mucosal edema or rhinorrhea. Mouth/Throat: Oropharynx is clear and moist and mucous membranes are normal.   Neck: Normal range of motion. Neck supple. No thyromegaly present. Cardiovascular: Normal rate and regular rhythm. No murmur heard. Pulmonary/Chest: Effort normal. She has no wheezes. Abdominal: Soft. Bowel sounds are normal. There is no tenderness. Musculoskeletal: Normal range of motion. She exhibits no edema. Lymphadenopathy:     She has no cervical adenopathy. Skin: Skin is warm and dry. Psychiatric: She has a normal mood and affect. Nursing note and vitals reviewed. ASSESSMENT and PLAN  Diagnoses and all orders for this visit:    1. Essential hypertension  Discussed sodium restriction, high k rich diet, maintaining ideal body weight and regular exercise program such as daily walking 30 min perday 4-5 times per week, as physiologic means to achieve blood pressure control.  Medication compliance advised.      2. Lumbar arthropathy  -     Refill HYDROcodone-acetaminophen (NORCO) 5-325 mg per tablet; TAKE 1 TABLET EVERY 8 HOURS AS NEEDED FOR PAIN    3. Primary insomnia  -     Refill zolpidem CR (AMBIEN CR) 6.25 mg tablet; TAKE 1 TABLET BY MOUTH IN THE EVENING AS NEEDED FOR SLEEP    4. Anxiety  Stable     5. Chronic upper back pain//  6. Encounter for pain management planning  -     9-DRUG SCREEN + OXY, UR    7. Environmental allergies  Stable     8. Non morbid obesity  I have reviewed/discussed the above normal BMI with the patient. I have recommended the following interventions: dietary management education, guidance, and counseling . 9. Encounter for medication monitoring    Other orders  -     Refill desonide (TRIDESILON) 0.05 % topical lotion; Apply  to affected area two (2) times daily as needed for Skin Irritation. Follow-up Disposition:  Return in about 5 months (around 3/22/2019).   reviewed diet, exercise and weight control  cardiovascular risk and specific lipid/LDL goals reviewed  reviewed medications and side effects in detail

## 2018-10-23 LAB
AMPHETAMINES UR QL SCN: NEGATIVE NG/ML
BARBITURATES UR QL SCN: NEGATIVE NG/ML
BENZODIAZ UR QL SCN: NEGATIVE NG/ML
BZE UR QL SCN: NEGATIVE NG/ML
CANNABINOIDS UR QL SCN: NEGATIVE NG/ML
CREAT UR-MCNC: 31.3 MG/DL (ref 20–300)
METHADONE UR QL SCN: NEGATIVE NG/ML
OPIATES UR QL SCN: NEGATIVE NG/ML
OXYCODONE+OXYMORPHONE UR QL SCN: NEGATIVE NG/ML
PCP UR QL: NEGATIVE NG/ML
PH UR: 5.7 [PH] (ref 4.5–8.9)
PLEASE NOTE:, 733163: NORMAL
PROPOXYPH UR QL SCN: NEGATIVE NG/ML

## 2018-10-23 RX ORDER — ALPRAZOLAM 0.25 MG/1
0.38 TABLET ORAL
Qty: 45 TAB | Refills: 1 | OUTPATIENT
Start: 2018-10-23 | End: 2018-12-14 | Stop reason: SDUPTHER

## 2018-11-27 RX ORDER — TRIAMCINOLONE ACETONIDE 1 MG/ML
LOTION TOPICAL 2 TIMES DAILY
Qty: 60 ML | Refills: 0 | Status: SHIPPED | OUTPATIENT
Start: 2018-11-27 | End: 2019-05-20

## 2018-12-10 NOTE — TELEPHONE ENCOUNTER
Pt is calling for a hydrocodone refill   States she only got 21 last month   The pharmacy told her we could call the insurance co. So that she will be able to get 60 as it was written       Best number to reach her is [phone]

## 2018-12-11 ENCOUNTER — DOCUMENTATION ONLY (OUTPATIENT)
Dept: FAMILY MEDICINE CLINIC | Age: 53
End: 2018-12-11

## 2018-12-12 DIAGNOSIS — M47.816 LUMBAR ARTHROPATHY: ICD-10-CM

## 2018-12-12 RX ORDER — HYDROCODONE BITARTRATE AND ACETAMINOPHEN 5; 325 MG/1; MG/1
TABLET ORAL
Qty: 21 TAB | Refills: 0 | Status: SHIPPED | OUTPATIENT
Start: 2018-12-12 | End: 2019-01-17 | Stop reason: SDUPTHER

## 2018-12-13 ENCOUNTER — DOCUMENTATION ONLY (OUTPATIENT)
Dept: FAMILY MEDICINE CLINIC | Age: 53
End: 2018-12-13

## 2018-12-14 ENCOUNTER — TELEPHONE (OUTPATIENT)
Dept: FAMILY MEDICINE CLINIC | Age: 53
End: 2018-12-14

## 2018-12-14 DIAGNOSIS — F41.9 ANXIETY: ICD-10-CM

## 2018-12-14 RX ORDER — ALPRAZOLAM 0.25 MG/1
0.38 TABLET ORAL
Qty: 45 TAB | Refills: 1 | Status: SHIPPED | OUTPATIENT
Start: 2018-12-14 | End: 2020-03-20 | Stop reason: SDUPTHER

## 2018-12-14 NOTE — TELEPHONE ENCOUNTER
Patient is requesting a RX refill     ALPRAZolam (XANAX) 0.25 mg tablet she can be reached @ 21 [phone]

## 2018-12-28 RX ORDER — AMOXICILLIN AND CLAVULANATE POTASSIUM 875; 125 MG/1; MG/1
1 TABLET, FILM COATED ORAL 2 TIMES DAILY
Qty: 20 TAB | Refills: 0 | Status: SHIPPED | OUTPATIENT
Start: 2018-12-28 | End: 2019-01-07

## 2018-12-28 NOTE — TELEPHONE ENCOUNTER
----- Message from Vicky Irvin sent at 12/28/2018  3:48 PM EST -----  Regarding: /Telephone  Pt advised that she has been sick for the past week with a chest cold and sinus infection.       Best contact:[phone]

## 2019-01-17 DIAGNOSIS — M47.816 LUMBAR ARTHROPATHY: ICD-10-CM

## 2019-01-17 DIAGNOSIS — F51.01 PRIMARY INSOMNIA: ICD-10-CM

## 2019-01-18 RX ORDER — ZOLPIDEM TARTRATE 6.25 MG/1
TABLET, FILM COATED, EXTENDED RELEASE ORAL
Qty: 90 TAB | Refills: 0 | OUTPATIENT
Start: 2019-01-18 | End: 2019-01-25 | Stop reason: SDUPTHER

## 2019-01-18 RX ORDER — HYDROCODONE BITARTRATE AND ACETAMINOPHEN 5; 325 MG/1; MG/1
TABLET ORAL
Qty: 21 TAB | Refills: 0 | Status: SHIPPED | OUTPATIENT
Start: 2019-01-18 | End: 2019-03-08 | Stop reason: SDUPTHER

## 2019-01-25 ENCOUNTER — OFFICE VISIT (OUTPATIENT)
Dept: FAMILY MEDICINE CLINIC | Age: 54
End: 2019-01-25

## 2019-01-25 ENCOUNTER — DOCUMENTATION ONLY (OUTPATIENT)
Dept: FAMILY MEDICINE CLINIC | Age: 54
End: 2019-01-25

## 2019-01-25 VITALS
TEMPERATURE: 96.8 F | DIASTOLIC BLOOD PRESSURE: 85 MMHG | OXYGEN SATURATION: 96 % | WEIGHT: 198 LBS | HEIGHT: 61 IN | SYSTOLIC BLOOD PRESSURE: 130 MMHG | RESPIRATION RATE: 16 BRPM | BODY MASS INDEX: 37.38 KG/M2 | HEART RATE: 73 BPM

## 2019-01-25 DIAGNOSIS — R53.83 MALAISE AND FATIGUE: ICD-10-CM

## 2019-01-25 DIAGNOSIS — R53.81 MALAISE AND FATIGUE: ICD-10-CM

## 2019-01-25 DIAGNOSIS — F51.01 PRIMARY INSOMNIA: ICD-10-CM

## 2019-01-25 DIAGNOSIS — J40 BRONCHITIS: Primary | ICD-10-CM

## 2019-01-25 LAB
FLUAV+FLUBV AG NOSE QL IA.RAPID: NEGATIVE POS/NEG
FLUAV+FLUBV AG NOSE QL IA.RAPID: NEGATIVE POS/NEG
MONONUCLEOSIS SCREEN POC: NEGATIVE
VALID INTERNAL CONTROL?: YES
VALID INTERNAL CONTROL?: YES

## 2019-01-25 RX ORDER — ZOLPIDEM TARTRATE 6.25 MG/1
TABLET, FILM COATED, EXTENDED RELEASE ORAL
Qty: 90 TAB | Refills: 1 | OUTPATIENT
Start: 2019-01-25 | End: 2019-04-22 | Stop reason: SDUPTHER

## 2019-01-25 RX ORDER — PREDNISONE 10 MG/1
10 TABLET ORAL 2 TIMES DAILY
Qty: 10 TAB | Refills: 0 | Status: SHIPPED | OUTPATIENT
Start: 2019-01-25 | End: 2019-01-30

## 2019-01-25 RX ORDER — FLUORIDE (SODIUM) 1.1 %
PASTE (ML) DENTAL
Refills: 4 | COMMUNITY
Start: 2018-10-31

## 2019-01-25 NOTE — PROGRESS NOTES
Chief Complaint Patient presents with  Cough  Fatigue 1. Have you been to the ER, urgent care clinic since your last visit? Hospitalized since your last visit? NO 
 
2. Have you seen or consulted any other health care providers outside of the [address] [place] [address] since your last visit? Include any pap smears or colon screening. NO Sick at 12/25/18 with  Sneezing, chest cold, coughing up mucus, we called in N , finished 1/10/19, still feel fatigued .

## 2019-01-26 LAB
25(OH)D3+25(OH)D2 SERPL-MCNC: 26 NG/ML (ref 30–100)
BUN SERPL-MCNC: 10 MG/DL (ref 6–24)
BUN/CREAT SERPL: 14 (ref 9–23)
CALCIUM SERPL-MCNC: 9.6 MG/DL (ref 8.7–10.2)
CHLORIDE SERPL-SCNC: 101 MMOL/L (ref 96–106)
CO2 SERPL-SCNC: 24 MMOL/L (ref 20–29)
CREAT SERPL-MCNC: 0.73 MG/DL (ref 0.57–1)
GLUCOSE SERPL-MCNC: 128 MG/DL (ref 65–99)
POTASSIUM SERPL-SCNC: 3.9 MMOL/L (ref 3.5–5.2)
SODIUM SERPL-SCNC: 140 MMOL/L (ref 134–144)
VIT B12 SERPL-MCNC: 760 PG/ML (ref 232–1245)

## 2019-01-28 NOTE — PROGRESS NOTES
HISTORY OF PRESENT ILLNESS Kristina Segovia is a 47 y.o. female. HPI  
C/o nasal and chest congestion and cough for the past 2 to 3 weeks. Had zpak which did help with the congestion. Cough has lingered still. Still feeling some congestion in the chest.  No fever or chills noted. Has malaise and has felt extremely tired. Throat is scratchy. Has post nasal drainage. No chest pain or SOB. No wheezing noted. No abd sx. Sleeping well at night except when the cough may wake her up. Patient Active Problem List  
Diagnosis Code  Palpitations R00.2  Depression F32.9  Environmental allergies Z91.09  
 [facility], [facility] C50.919  GERD (gastroesophageal reflux disease) K21.9  Insomnia G47.00  Essential hypertension I10  
 ADD (attention deficit disorder) F98.8  Encounter for medication monitoring Z51.81  Severe obesity (BMI 35.0-39. 9) with comorbidity ([facility].) E66.01  
 
 
Current Outpatient Medications Medication Sig Dispense Refill  PREVIDENT 5000 BOOSTER PLUS 1.1 % pste USE ONCE A DAY, PREFERABLY AT BEDTIME  4  
 predniSONE (DELTASONE) 10 mg tablet Take 10 mg by mouth two (2) times a day for 5 days. 10 Tab 0  
 HYDROcodone-acetaminophen (NORCO) 5-325 mg per tablet TAKE 1 TABLET EVERY 8 HOURS AS NEEDED FOR PAIN 21 Tab 0  ALPRAZolam (XANAX) 0.25 mg tablet Take 1.5 Tabs by mouth daily as needed for Anxiety. 45 Tab 1  
 triamcinolone (KENALOG) 0.1 % lotion Apply  to affected area two (2) times a day. use thin layer 60 mL 0  
 desonide (TRIDESILON) 0.05 % topical lotion Apply  to affected area two (2) times daily as needed for Skin Irritation. 59 mL 0  
 metoprolol succinate (TOPROL XL) 25 mg XL tablet Take 0.5 Tabs by mouth daily. 45 Tab 3  
 ergocalciferol (VITAMIN D2) 50,000 unit capsule Take 1 Cap by mouth every seven (7) days. 13 Cap 3  celecoxib (CELEBREX) 200 mg capsule TAKE 1 CAPSULE TWICE A DAY AS NEEDED 180 Cap 3  
  pantoprazole (PROTONIX) 40 mg tablet Take 1 Tab by mouth daily. 30 Tab 11  
 EPINEPHrine (EPIPEN 2-JULIAN) 0.3 mg/0.3 mL injection 0.3 mL by IntraMUSCular route once as needed for up to 1 dose. 0.6 mL 1  
 mometasone (NASONEX) 50 mcg/actuation nasal spray 2 Sprays by Both Nostrils route as needed. 1 Container 11  
 albuterol sulfate (PROAIR RESPICLICK) 90 mcg/actuation aepb Take 2 Puffs by inhalation every four (4) hours as needed. 1 Inhaler 0  
 pilocarpine (SALAGEN) 5 mg tablet Take 1 tab 2 times daily  2  
 diphenhydrAMINE (BENADRYL) 25 mg capsule Take 50 mg by mouth every six (6) hours as needed. Indications: ALLERGIC REACTIONS  cetirizine (ZYRTEC) 10 mg tablet Take 10 mg by mouth nightly.  zolpidem CR (AMBIEN CR) 6.25 mg tablet TAKE 1 TABLET BY MOUTH EVERY EVENING AS NEEDED FOR SLEEP 90 Tab 1  
 loratadine (CLARITIN) 10 mg tablet Take 10 mg by mouth daily (after lunch). TAKES AS NEEDED Allergies Allergen Reactions  Other Food Other (comments) PT STATES OTHER FOOD ALLERGIES WITH VARYING REACTIONS:  PORK, LETTUCE, LIMA BEANS, BLACK PEPPER, TEA, MALT, WALNUTS.  Beef Containing Products Anaphylaxis  Food Extracts Anaphylaxis Beef, milk, peanuts  Milk Anaphylaxis  Peanut Anaphylaxis  Alpha-D-Galactosidase Diarrhea  Azithromycin Nausea and Vomiting  Erythromycin Nausea and Vomiting  Guaifenesin Nausea and Vomiting  Morphine Nausea and Vomiting Projectile vomiting per patient  Other Medication Other (comments) WHITE CELL STIMULATOR INJECTION (NEULASTIN?) CAUSED SEVERE BONE PAIN LASTING A COUPLE WEEKS. Past Medical History:  
Diagnosis Date  Anemia 10/3/13 \"SINCE I WAS A KID\"  Anxiety DURING CHEMO; PT STAYS XANAX AS OF 10/3/13  Cancer [facility]) [id number]  
 breast,chemo ended 10/2012  Chronic pain   
 si joints sacrum  Depression  Nausea & vomiting   
 used patch in past with good results  Other ill-defined conditions(149.89)   
 anemia,chronic diarrhea, TMJ  
 Ovarian cyst   
 Palpitations TAKES METOPROLOL FOR TACHYCARDIA  PUD (peptic ulcer disease)   
 occurred at age 33 .  Unspecified adverse effect of anesthesia TMJ Past Surgical History:  
Procedure Laterality Date  BREAST SURGERY PROCEDURE UNLISTED    
 breast biopsy x3 left breast  
 HX BREAST RECONSTRUCTION  7/10/2013 BREAST NIPPLE RECONSTRUCTION REVISION performed by Alyssa Plaza MD at [address] HX BREAST RECONSTRUCTION  2012 REINCISION LEFT BREAST FOR REMAINDER OF BREAST CA  
 HX BREAST RECONSTRUCTION  2012 R SEROMA EXCISED, REPLACE IMPLANT R BREAST [facility]  HX ENDOSCOPY [facility] HX GI    
 anal fissure repair  HX GYN  10/21/13 TLH/BSO  HX HYSTERECTOMY  10/2013 Dr. Freddy Newman  HX MASTECTOMY  2012  
 double  HX OTHER SURGICAL DENTAL SURGERIES WITH SEDATION  
 HX VASCULAR ACCESS  2012 PORTACATH INSERTION  
 HX VASCULAR ACCESS  2012 PORTCATH REMOVAL  LAMINOTOMY  2010  [facility] COLONOS[facility] FLX DX W/COLLJ SPEC WHEN PFRMD  2011 Dr Stacie Arce Family History Problem Relation Age of Onset Shashi Laurent Mother 64 [place] Mother 64  OF LUNG CA  
 Hypertension Father  Diabetes Father  Emphysema Father  COPD Father  Heart Attack Sister 50  
 Heart Disease Sister  Breast Cancer Sister  No Known Problems Brother  Anxiety Brother  No Known Problems Sister  Breast Cancer Paternal Grandmother  Malignant Hyperthermia Neg Hx  Pseudocholinesterase Deficiency Neg Hx  Delayed Awakening Neg Hx  Post-op Nausea/Vomiting Neg Hx  Emergence Delirium Neg Hx  Post-op Cognitive Dysfunction Neg Hx   
 Other Neg Hx Social History Tobacco Use  Smoking status: Never Smoker  Smokeless tobacco: Never Used Substance Use Topics  Alcohol use: No  
  Alcohol/week: 0.0 oz Lab Results Component Value Date/Time WBC 6.4 07/17/2018 03:50 PM  
 HGB 12.5 07/17/2018 03:50 PM  
 HCT 38.7 07/17/2018 03:50 PM  
 PLATELET 880 97/21/3997 03:50 PM  
 MCV 80 07/17/2018 03:50 PM  
 
Lab Results Component Value Date/Time Cholesterol, total 221 (H) 07/17/2018 03:50 PM  
 HDL Cholesterol 42 07/17/2018 03:50 PM  
 LDL, calculated 148 (H) 07/17/2018 03:50 PM  
 LDL-C, External 139 05/21/2015 01:33 PM  
 Triglyceride 153 (H) 07/17/2018 03:50 PM  
 CHOL/HDL Ratio 3.6 12/15/2009 02:10 PM  
 
Lab Results Component Value Date/Time TSH 2.330 01/03/2018 05:01 PM  
 T4, Free 1.05 03/19/2011 10:55 AM  
  
Lab Results Component Value Date/Time Sodium 140 01/25/2019 09:49 AM  
 Potassium 3.9 01/25/2019 09:49 AM  
 Chloride 101 01/25/2019 09:49 AM  
 CO2 24 01/25/2019 09:49 AM  
 Anion gap 6 10/22/2013 04:40 AM  
 Glucose 128 (H) 01/25/2019 09:49 AM  
 BUN 10 01/25/2019 09:49 AM  
 Creatinine 0.73 01/25/2019 09:49 AM  
 BUN/Creatinine ratio 14 01/25/2019 09:49 AM  
 GFR est  01/25/2019 09:49 AM  
 GFR est non-AA 95 01/25/2019 09:49 AM  
 Calcium 9.6 01/25/2019 09:49 AM  
 Bilirubin, total 0.4 07/17/2018 03:50 PM  
 ALT (SGPT) 35 (H) 07/17/2018 03:50 PM  
 AST (SGOT) 23 07/17/2018 03:50 PM  
 Alk. phosphatase 109 07/17/2018 03:50 PM  
 Protein, total 7.1 07/17/2018 03:50 PM  
 Albumin 4.5 07/17/2018 03:50 PM  
 Globulin 3.2 10/03/2013 04:29 PM  
 A-G Ratio 1.7 07/17/2018 03:50 PM  
  
Lab Results Component Value Date/Time Hemoglobin A1c (POC) 5.8 07/17/2018 03:50 PM  
 Hemoglobin A1c, External 5.7 05/21/2015 01:33 PM  
   
Review of Systems Constitutional: Positive for malaise/fatigue. HENT: Positive for congestion and sore throat. Eyes: Negative for blurred vision. Respiratory: Positive for cough. Negative for shortness of breath. Cardiovascular: Negative for chest pain, palpitations and leg swelling. Gastrointestinal: Negative for abdominal pain, constipation and heartburn. Genitourinary: Negative for dysuria, frequency and urgency. Musculoskeletal: Negative for back pain and joint pain. Neurological: Negative for dizziness, tingling and headaches. Endo/Heme/Allergies: Negative for environmental allergies. Psychiatric/Behavioral: Negative for depression. The patient does not have insomnia. Physical Exam  
Constitutional: She appears well-developed and well-nourished. /85 (BP 1 Location: Left arm, BP Patient Position: Sitting)   Pulse 73   Temp 96.8 °F (36 °C) (Oral)   Resp 16   Ht 5' 1\" (1.549 m)   Wt 198 lb (89.8 kg)   LMP 08/03/2012   SpO2 96%    L/min   BMI 37.41 kg/m² HENT:  
Right Ear: Tympanic membrane and ear canal normal.  
Left Ear: Tympanic membrane and ear canal normal.  
Nose: Mucosal edema and rhinorrhea present. Mouth/Throat: Mucous membranes are normal. Posterior oropharyngeal erythema present. No oropharyngeal exudate. Neck: Trachea normal, normal range of motion and full passive range of motion without pain. Neck supple. No edema present. No thyromegaly present. Cardiovascular: Normal rate and regular rhythm. Pulmonary/Chest: Effort normal and breath sounds normal. She has no wheezes. She has no rales. Abdominal: Soft. Normal appearance and bowel sounds are normal. There is no tenderness. Lymphadenopathy:  
  She has no cervical adenopathy. Nursing note and vitals reviewed. ASSESSMENT and PLAN Diagnoses and all orders for this visit: 1. Bronchitis -     XR CHEST PA LAT; Future -     predniSONE (DELTASONE) 10 mg tablet; Take 10 mg by mouth two (2) times a day for 5 days. 2. Malaise and fatigue -     METABOLIC PANEL, BASIC 
-     AMB POC COMPLETE CBC, AUTOMATED 
-     AMB POC ADILIA INFLUENZA A/B TEST 
-     VITAMIN B12 
-     VITAMIN D, 25 HYDROXY -     predniSONE (DELTASONE) 10 mg tablet; Take 10 mg by mouth two (2) times a day for 5 days. 
-     POC HETEROPHILE ANTIBODY SCREEN Increase fluids and increased rest.   
 
 
Follow-up Disposition:  
reviewed medications and side effects in detail I have discussed diagnosis listed in this note with pt and/or family. I have discussed treatment plans and options and the risk/benefit analysis of those options, including safe use of medications and possible medication side effects. Through the use of shared decision making we have agreed to the above plan. The patient has received an after-visit summary and questions were answered concerning future plans and follow up. Advise pt of any urgent changes then to proceed to the ER.

## 2019-03-08 DIAGNOSIS — M47.816 LUMBAR ARTHROPATHY: ICD-10-CM

## 2019-03-12 RX ORDER — HYDROCODONE BITARTRATE AND ACETAMINOPHEN 5; 325 MG/1; MG/1
1 TABLET ORAL
Qty: 21 TAB | Refills: 0 | Status: SHIPPED | OUTPATIENT
Start: 2019-03-12 | End: 2019-04-01 | Stop reason: SDUPTHER

## 2019-03-13 ENCOUNTER — TELEPHONE (OUTPATIENT)
Dept: FAMILY MEDICINE CLINIC | Age: 54
End: 2019-03-13

## 2019-03-13 ENCOUNTER — DOCUMENTATION ONLY (OUTPATIENT)
Dept: FAMILY MEDICINE CLINIC | Age: 54
End: 2019-03-13

## 2019-04-01 ENCOUNTER — OFFICE VISIT (OUTPATIENT)
Dept: FAMILY MEDICINE CLINIC | Age: 54
End: 2019-04-01

## 2019-04-01 VITALS
DIASTOLIC BLOOD PRESSURE: 88 MMHG | RESPIRATION RATE: 16 BRPM | WEIGHT: 202 LBS | BODY MASS INDEX: 38.14 KG/M2 | HEIGHT: 61 IN | SYSTOLIC BLOOD PRESSURE: 132 MMHG | HEART RATE: 76 BPM | OXYGEN SATURATION: 96 % | TEMPERATURE: 97.3 F

## 2019-04-01 DIAGNOSIS — M47.816 LUMBAR ARTHROPATHY: ICD-10-CM

## 2019-04-01 DIAGNOSIS — M54.9 CHRONIC UPPER BACK PAIN: Primary | ICD-10-CM

## 2019-04-01 DIAGNOSIS — Z01.89 ENCOUNTER FOR PAIN MANAGEMENT PLANNING: ICD-10-CM

## 2019-04-01 DIAGNOSIS — G89.29 ENCOUNTER FOR CHRONIC PAIN MANAGEMENT: ICD-10-CM

## 2019-04-01 DIAGNOSIS — G89.29 CHRONIC UPPER BACK PAIN: Primary | ICD-10-CM

## 2019-04-01 RX ORDER — HYDROCODONE BITARTRATE AND ACETAMINOPHEN 5; 325 MG/1; MG/1
1 TABLET ORAL
Qty: 21 TAB | Refills: 0 | Status: SHIPPED | OUTPATIENT
Start: 2019-04-01 | End: 2019-07-09

## 2019-04-01 NOTE — PROGRESS NOTES
HISTORY OF PRESENT ILLNESS Sánchez Huang is a 47 y.o. female. Encounter for Pain management. 1./2. Medical history/Past medical History Chronic Pain: 
Patient has osteoarthritis in the lower back and suffer with chronic pain in her upper and lower back. Has been taking celebrex twice a day as long as she does not have GI upset. She uses the hydrocodone for more severe pain. Pain ranges from 6-9/10. MRI lumbar spine in 2009 showed DDD then with bulging disc and central radial tear in L4-5. She had laminectomy in 2010 for one disc. She has had multiple steroid epidural injections. Last GABY has been several years. Her back pain comes and goes related to multiple factors such as activity, driving long distances,  stress, weather. Symptoms onset: problem is longstanding. Rheumatological ROS: stable, mild-to-moderate joint symptoms intermittently, reasonably well controlled by PRN meds. Response to treatment plan: stable. 3. Applicable records from prior treatment providers are apart of [facility] under the media tab. 4. Diagnostic, therapeutic and laboratory results are available in the [address] [place] [address] chart. 5. Consultation notes are available for review in the media tab of the [address] [place] [address] chart. 6. Treatment goals include pain control so that the pt may be as active and function with her daily activities and improved comfort level. Previously pt has been limited by pain. 7. The risks and benefits of treatment has been discussed at this office visit with the pt. She understands that the medication has addicting potential.  Additionally the pt has been advised that narcotic pain medication may impair mental and/or physical ability required for performance of tasks such as driving or operating any other machinery. 8. Pt has an updated signed pain contract on file and can be found under the FYI section of the [facility] chart. 9. The pain contract is reviewed. Pain medication will be continued at the previous dosage. Urine drug screening will be done today. Diagnostic studies are not indicated at this time. Interventional procedure are not indicated at this time. 10. Medication prescibed is HYDROcodone-acetaminophen (NORCO) 5-325 mg per tablet .  has been reviewed at this OV by me. Refill is needed today. Pill count 5. 11. Patient instructions have been reviewed in detail as outlined above and in the pain contract. 12. Re-eval is planned for 3 months. She reports the following adverse side effects: none. Aberrant behaviors: None. Concomitant use of a benzodiazepine: yes as needed. Will continue on current dose of medications as coarse has been stable and there are no signs of overuse, misuese, diversion, or concerning side effects Naloxone prescription is warranted. It has been provided or is already on file. Patient Active Problem List  
Diagnosis Code  Palpitations R00.2  Depression F32.9  Environmental allergies Z91.09  
 [facility] C50.919  GERD (gastroesophageal reflux disease) K21.9  Insomnia G47.00  Essential hypertension I10  
 ADD (attention deficit disorder) F98.8  Encounter for medication monitoring Z51.81  Severe obesity (BMI 35.0-39. 9) with comorbidity ([facility].) E66.01  
 
 
Current Outpatient Medications Medication Sig Dispense Refill  
 HYDROcodone-acetaminophen (NORCO) 5-325 mg per tablet Take 1 Tab by mouth every eight (8) hours as needed for Pain for up to 99 days. Max Daily Amount: 3 Tabs. 21 Tab 0  
 PREVIDENT 5000 BOOSTER PLUS 1.1 % pste USE ONCE A DAY, PREFERABLY AT BEDTIME  4  
 zolpidem CR (AMBIEN CR) 6.25 mg tablet TAKE 1 TABLET BY MOUTH EVERY EVENING AS NEEDED FOR SLEEP 90 Tab 1  ALPRAZolam (XANAX) 0.25 mg tablet Take 1.5 Tabs by mouth daily as needed for Anxiety.  45 Tab 1  
  triamcinolone (KENALOG) 0.1 % lotion Apply  to affected area two (2) times a day. use thin layer 60 mL 0  
 desonide (TRIDESILON) 0.05 % topical lotion Apply  to affected area two (2) times daily as needed for Skin Irritation. 59 mL 0  
 metoprolol succinate (TOPROL XL) 25 mg XL tablet Take 0.5 Tabs by mouth daily. 45 Tab 3  
 ergocalciferol (VITAMIN D2) 50,000 unit capsule Take 1 Cap by mouth every seven (7) days. 13 Cap 3  celecoxib (CELEBREX) 200 mg capsule TAKE 1 CAPSULE TWICE A DAY AS NEEDED 180 Cap 3  pantoprazole (PROTONIX) 40 mg tablet Take 1 Tab by mouth daily. 30 Tab 11  
 EPINEPHrine (EPIPEN 2-JULIAN) 0.3 mg/0.3 mL injection 0.3 mL by IntraMUSCular route once as needed for up to 1 dose. 0.6 mL 1  
 mometasone (NASONEX) 50 mcg/actuation nasal spray 2 Sprays by Both Nostrils route as needed. 1 Container 11  
 albuterol sulfate (PROAIR RESPICLICK) 90 mcg/actuation aepb Take 2 Puffs by inhalation every four (4) hours as needed. 1 Inhaler 0  
 pilocarpine (SALAGEN) 5 mg tablet Take 1 tab 2 times daily  2  
 diphenhydrAMINE (BENADRYL) 25 mg capsule Take 50 mg by mouth every six (6) hours as needed. Indications: ALLERGIC REACTIONS  loratadine (CLARITIN) 10 mg tablet Take 10 mg by mouth daily (after lunch). TAKES AS NEEDED  cetirizine (ZYRTEC) 10 mg tablet Take 10 mg by mouth nightly. Allergies Allergen Reactions  Other Food Other (comments) PT STATES OTHER FOOD ALLERGIES WITH VARYING REACTIONS:  PORK, LETTUCE, LIMA BEANS, BLACK PEPPER, TEA, MALT, WALNUTS.  Beef Containing Products Anaphylaxis  Food Extracts Anaphylaxis Beef, milk, peanuts  Milk Anaphylaxis  Peanut Anaphylaxis  Alpha-D-Galactosidase Diarrhea  Azithromycin Nausea and Vomiting  Erythromycin Nausea and Vomiting  Guaifenesin Nausea and Vomiting  Morphine Nausea and Vomiting Projectile vomiting per patient  Other Medication Other (comments) WHITE CELL STIMULATOR INJECTION (NEULASTIN?) CAUSED SEVERE BONE PAIN LASTING A COUPLE WEEKS. Past Medical History:  
Diagnosis Date  Anemia 10/3/13 \"SINCE I WAS A KID\"  Anxiety DURING CHEMO; PT STAYS XANAX AS OF 10/3/13  Cancer [facility]) [id number]  
 breast,chemo ended 10/2012  Chronic pain   
 si joints sacrum  Depression  Nausea & vomiting   
 used patch in past with good results  Other ill-defined conditions(799.89)   
 anemia,chronic diarrhea, TMJ  
 Ovarian cyst   
 Palpitations TAKES METOPROLOL FOR TACHYCARDIA  PUD (peptic ulcer disease)   
 occurred at age 33 .  Unspecified adverse effect of anesthesia TMJ Past Surgical History:  
Procedure Laterality Date  BREAST SURGERY PROCEDURE UNLISTED    
 breast biopsy x3 left breast  
 HX BREAST RECONSTRUCTION  7/10/2013 BREAST NIPPLE RECONSTRUCTION REVISION performed by Steven Valdez MD at [address] [place] [address] HX BREAST RECONSTRUCTION  2012 REINCISION LEFT BREAST FOR REMAINDER OF BREAST CA  
 HX BREAST RECONSTRUCTION  2012 R SEROMA EXCISED, REPLACE IMPLANT R BREAST [facility]  HX ENDOSCOPY [facility] HX GI    
 anal fissure repair  HX GYN  10/21/13 TLH/BSO  HX HYSTERECTOMY  10/2013 Dr. Juanito Sosa  HX MASTECTOMY  2012  
 double  HX OTHER SURGICAL DENTAL SURGERIES WITH SEDATION  
 HX VASCULAR ACCESS  2012 PORTACATH INSERTION  
 HX VASCULAR ACCESS  2012 PORTCATH REMOVAL  LAMINOTOMY  2010  [facility] COLONOSCOPY FLX DX W/COLLJ SPEC WHEN PFRMD  2011 Dr Ferrara Family History Problem Relation Age of Onset Jimkarlie Maryann Mother 64 [place] Díaz Mother 64  OF LUNG CA  
 Hypertension Father  Diabetes Father  Emphysema Father  COPD Father  Heart Attack Sister 50  
 Heart Disease Sister  Breast Cancer Sister  No Known Problems Brother  Anxiety Brother  No Known Problems Sister  Breast Cancer Paternal Grandmother  Malignant Hyperthermia Neg Hx  Pseudocholinesterase Deficiency Neg Hx  Delayed Awakening Neg Hx  Post-op Nausea/Vomiting Neg Hx  Emergence Delirium Neg Hx  Post-op Cognitive Dysfunction Neg Hx   
 Other Neg Hx Social History Tobacco Use  Smoking status: Never Smoker  Smokeless tobacco: Never Used Substance Use Topics  Alcohol use: No  
  Alcohol/week: 0.0 oz Lab Results Component Value Date/Time WBC 6.4 07/17/2018 03:50 PM  
 HGB 12.5 07/17/2018 03:50 PM  
 HCT 38.7 07/17/2018 03:50 PM  
 PLATELET 855 41/05/1795 03:50 PM  
 MCV 80 07/17/2018 03:50 PM  
 
Lab Results Component Value Date/Time Cholesterol, total 221 (H) 07/17/2018 03:50 PM  
 HDL Cholesterol 42 07/17/2018 03:50 PM  
 LDL, calculated 148 (H) 07/17/2018 03:50 PM  
 LDL-C, External 139 05/21/2015 01:33 PM  
 Triglyceride 153 (H) 07/17/2018 03:50 PM  
 CHOL/HDL Ratio 3.6 12/15/2009 02:10 PM  
 
Lab Results Component Value Date/Time TSH 2.330 01/03/2018 05:01 PM  
 T4, Free 1.05 03/19/2011 10:55 AM  
  
Lab Results Component Value Date/Time Sodium 140 01/25/2019 09:49 AM  
 Potassium 3.9 01/25/2019 09:49 AM  
 Chloride 101 01/25/2019 09:49 AM  
 CO2 24 01/25/2019 09:49 AM  
 Anion gap 6 10/22/2013 04:40 AM  
 Glucose 128 (H) 01/25/2019 09:49 AM  
 BUN 10 01/25/2019 09:49 AM  
 Creatinine 0.73 01/25/2019 09:49 AM  
 BUN/Creatinine ratio 14 01/25/2019 09:49 AM  
 GFR est  01/25/2019 09:49 AM  
 GFR est non-AA 95 01/25/2019 09:49 AM  
 Calcium 9.6 01/25/2019 09:49 AM  
 Bilirubin, total 0.4 07/17/2018 03:50 PM  
 ALT (SGPT) 35 (H) 07/17/2018 03:50 PM  
 AST (SGOT) 23 07/17/2018 03:50 PM  
 Alk. phosphatase 109 07/17/2018 03:50 PM  
 Protein, total 7.1 07/17/2018 03:50 PM  
 Albumin 4.5 07/17/2018 03:50 PM  
 Globulin 3.2 10/03/2013 04:29 PM  
 A-G Ratio 1.7 07/17/2018 03:50 PM  
  
Lab Results Component Value Date/Time Hemoglobin A1c (POC) 5.8 07/17/2018 03:50 PM  
 Hemoglobin A1c, External 5.7 05/21/2015 01:33 PM  
   
Review of Systems Constitutional: Negative for malaise/fatigue. HENT: Negative for congestion. Eyes: Negative for blurred vision. Respiratory: Negative for cough and shortness of breath. Cardiovascular: Negative for chest pain, palpitations and leg swelling. Gastrointestinal: Negative for abdominal pain, constipation and heartburn. Genitourinary: Negative for dysuria, frequency and urgency. Musculoskeletal: Positive for back pain. Negative for joint pain. Neurological: Negative for dizziness, tingling and headaches. Endo/Heme/Allergies: Negative for environmental allergies. Psychiatric/Behavioral: Negative for depression. The patient does not have insomnia. Physical Exam  
Constitutional: She appears well-developed and well-nourished. /88 (BP 1 Location: Left arm, BP Patient Position: Sitting)   Pulse 76   Temp 97.3 °F (36.3 °C) (Oral)   Resp 16   Ht 5' 1\" (1.549 m)   Wt 202 lb (91.6 kg)   LMP 08/03/2012   SpO2 96%   BMI 38.17 kg/m² HENT:  
Right Ear: Tympanic membrane and ear canal normal.  
Left Ear: Tympanic membrane and ear canal normal.  
Nose: No mucosal edema or rhinorrhea. Mouth/Throat: Oropharynx is clear and moist and mucous membranes are normal.  
Neck: Normal range of motion. Neck supple. No thyromegaly present. Cardiovascular: Normal rate and regular rhythm. No murmur heard. Pulmonary/Chest: Effort normal. She has no wheezes. Abdominal: Soft. Bowel sounds are normal. There is no tenderness. Musculoskeletal: Normal range of motion. She exhibits no edema. Thoracic back: She exhibits tenderness. She exhibits normal range of motion, no bony tenderness and no pain. Lumbar back: She exhibits tenderness and bony tenderness. She exhibits normal range of motion, no pain and no spasm. Lymphadenopathy: She has no cervical adenopathy. Neurological: She has normal strength and normal reflexes. No sensory deficit. Coordination and gait normal.  
Skin: Skin is warm and dry. Psychiatric: She has a normal mood and affect. Nursing note and vitals reviewed. ASSESSMENT and PLAN Diagnoses and all orders for this visit: 
 
1. Chronic upper back pain 2. Lumbar arthropathy 
-     HYDROcodone-acetaminophen (NORCO) 5-325 mg per tablet; Take 1 Tab by mouth every eight (8) hours as needed for Pain for up to 99 days. Max Daily Amount: 3 Tabs. 3. Encounter for chronic pain management 
-     9-DRUG SCREEN + OXY, UR The pt has signed medication agreement. Pain contract is reviewed. Pain medications will be continued at the previous dosage. Urine drug screening will be done today. Diagnostic  studies are not indicated at this time. Interventional procedure are not indicated at this time. Re-eval in 3 months. 4. Encounter for pain management planning Follow-up and Dispositions · Return in about 5 months (around 9/1/2019). reviewed medications and side effects in detail I have discussed diagnosis listed in this note with pt and/or family. I have discussed treatment plans and options and the risk/benefit analysis of those options, including safe use of medications and possible medication side effects. Through the use of shared decision making we have agreed to the above plan. The patient has received an after-visit summary and questions were answered concerning future plans and follow up. Advise pt of any urgent changes then to proceed to the ER.

## 2019-04-01 NOTE — LETTER
Name:Mark BISWAS [place] :1965 MR #:[id number] Provider Belkis Quintanilla MD  
*[id number]* BSMG-491 () Page 1 of 5 Initial [employer] CONTROLLED SUBSTANCE AGREEMENT I may be prescribed medications that are controlled substances as part  of my treatment plan for management of my medical condition(s). The goal of my treatment plan is to maintain and/or improve my health and wellbeing. Because controlled substances have an increased risk of abuse or harm, continual re-evaluation is needed determine if the goals of my treatment plan are being met for my safety and the safety of others. Kal Arcos  am entering into this Controlled Substance Agreement with my provider, Eric Kern MD at [address] . I understand that successful treatment requires mutual trust and honesty between me and my provider. I understand that there are state and federal laws and regulations which apply to the medications that my provider may prescribe that must be followed. I understand there are risks and benefits ts of taking the medicines that my provider may prescribe. I understand and agree that following this Agreement is necessary in continuing my provider-patient relationship and success of my treatment plan. As a part of my treatment plan, I agree to the following: COMMUNICATION: 
 
1. I will communicate fully with my provider about my medical condition(s), including the effect on my daily life and how well my medications are helping. I will tell my provider all of the medications that I take for any reason, including medications I receive from another health care provider, and will notify my provider about all issues, problems or concerns, including any side effects, which may be related to my medications. I understand that this information allows my provider to adjust my treatment plan to help manage my medical condition.  I understand that this information will become part of my permanent medical record. 2. I will notify my provider if I have a history of alcohol/drug misuse/addiction or if I have had treatment for alcohol/drug addiction in the past, or if I have a new problem with or concern about alcohol/drug use/addiction, because this increases the likelihood of high risk behaviors and may lead to serious medical conditions. 3. Females Only: I will notify my provider if I am or become pregnant, or if I intend to become pregnant, or if I intend to breastfeed. I understand that communication of these issues with my provider is important, due to possible effects my medication could have on an unborn fetus or breastfeeding child. Name:. Shanekalistrobi Khoury :1965 MR #:[id number] Provider Denny Brooks MD  
*[id number]* BSMG-491 () Page 2 of 5 Initial [employer] MISUSE OF MEDICATIONS / DRUGS: 
 
1. I agree to take all controlled substances as prescribed, and will not misuse or abuse any controlled substances prescribed by my provider. For my safety, I will not increase the amount of medicine I take without first talking with and getting permission from my provider. 2. If I have a medical emergency, another health care provider may prescribe me medication. If I seek emergency treatment, I will notify my provider within seventy-two (72) hours. 3. I understand that my provider may discuss my use and/or possible misuse/abuse of controlled substances and alcohol, as appropriate, with any health care provider involved in my care, pharmacist or legal authority. ILLEGAL DRUGS: 
 
1. I will not use illegal drugs of any kind, including but not limited to marijuana, heroin, cocaine, or any prescription drug which is not prescribed to me. DRUG DIVERSION / PRESCRIPTION FRAUD: 
 
1. I will not share, sell, trade, give away, or otherwise misuse my prescriptions or medications. 2. I will not alter any prescriptions provided to me by my provider. SINGLE PROVIDER: 
 
1. I agree that all controlled substances that I take will be prescribed only by my provider (or his/her covering provider) under this Agreement. This agreement does not prevent me from seeking emergency medical treatment or receiving pain management related to a surgery. PROTECTING MEDICATIONS: 
 
1. I am responsible for keeping my prescriptions and medications in a safe and secure place including safeguarding them from loss or theft. I understand that lost, stolen or damaged/destroyed prescriptions or medications will not be replaced. Name:Hernandez Shelley :1965 MR #:[id number] Provider Doreen Cornejo MD  
*[id number]* BSMG-491 () Page 3 of 5 Initial [employer] PRESCRIPTION RENEWALS/REFILLS: 
 
1. I will follow my controlled substance medication schedule as prescribed by my provider. 2. I understand and agree that I will make any requests for renewals or refills of my prescriptions only at the time of an office visit or during my providers regular office hours subject to the prescription refill requirements of the individual practice. 3. I understand that my provider may not call in prescriptions for controlled substances to my pharmacy. 4. I understand that my provider may adjust or discontinue these medications as deemed appropriate for my medical treatment plan. This Agreement does not guarantee the prescription of controlled medications. 5. I agree that if my medications are adjusted or discontinued, I will properly dispose of any remaining medications. I understand that I will be required to dispose of any remaining controlled medications prior to being provided with any prescriptions for other controlled medications.  
 
 
1. I authorize my provider and my pharmacy to cooperate fully with any local, state, or federal law enforcement agency in the investigation of any possible misuse, sale, or other diversion of my controlled substance prescriptions or medications. RISKS: 
 
 
1. I understand that if I do not adhere to this Agreement in any way, my provider may change my prescriptions, stop prescribing controlled substances or end our provider-patient relationship. 2. If my provider decides to stop prescribing medication, or decides to end our provider-patient relationship,my provider may require that I taper my medications slowly. If necessary, my provider may also provide a prescription for other medications to treat my withdrawal symptoms. UNDERSTANDING THIS AGREEMENT: 
 
I understand that my provider may adjust or stop my prescriptions for controlled substances based on my medical condition and my treatment plan. I understand that this Agreement does not guarantee that I will be prescribed medications or controlled substances. I understand that controlled substances may be just one part 
of my treatment plan. My initial on each page and my signature below shows that I have read each page of this Agreement, I have had an opportunity to ask questions, and all of my questions have been answered to my satisfaction by my provider. By signing below, I agree to comply with this Agreement, and I understand that if I do not follow the Agreements listed above, my provider may stop 
 
 
 
_________________________________________  Date/Time 4/1/2019 9:54 AM   
             (Patient Signature)

## 2019-04-01 NOTE — PROGRESS NOTES
Chief Complaint Patient presents with  Medication Management  Alopecia  
  patient c/o hair loss 1. Have you been to the ER, urgent care clinic since your last visit? Hospitalized since your last visit? No 
 
2. Have you seen or consulted any other health care providers outside of the [address] [place] [address] since your last visit? Include any pap smears or colon screening.  No

## 2019-04-02 LAB
AMPHETAMINES UR QL SCN: NEGATIVE NG/ML
BARBITURATES UR QL SCN: NEGATIVE NG/ML
BENZODIAZ UR QL SCN: NEGATIVE NG/ML
BZE UR QL SCN: NEGATIVE NG/ML
CANNABINOIDS UR QL SCN: NEGATIVE NG/ML
CREAT UR-MCNC: 115 MG/DL (ref 20–300)
METHADONE UR QL SCN: NEGATIVE NG/ML
OPIATES UR QL SCN: NEGATIVE NG/ML
OXYCODONE+OXYMORPHONE UR QL SCN: NEGATIVE NG/ML
PCP UR QL: NEGATIVE NG/ML
PH UR: 5.3 [PH] (ref 4.5–8.9)
PLEASE NOTE:, 733163: NORMAL
PROPOXYPH UR QL SCN: NEGATIVE NG/ML

## 2019-04-08 ENCOUNTER — APPOINTMENT (OUTPATIENT)
Dept: GENERAL RADIOLOGY | Age: 54
End: 2019-04-08
Attending: EMERGENCY MEDICINE
Payer: COMMERCIAL

## 2019-04-08 ENCOUNTER — HOSPITAL ENCOUNTER (EMERGENCY)
Age: 54
Discharge: HOME | End: 2019-04-08
Attending: EMERGENCY MEDICINE
Payer: COMMERCIAL

## 2019-04-08 VITALS
RESPIRATION RATE: 17 BRPM | WEIGHT: 202.16 LBS | TEMPERATURE: 97.2 F | OXYGEN SATURATION: 95 % | HEART RATE: 82 BPM | SYSTOLIC BLOOD PRESSURE: 136 MMHG | HEIGHT: 60 IN | BODY MASS INDEX: 39.69 KG/M2 | DIASTOLIC BLOOD PRESSURE: 92 MMHG

## 2019-04-08 DIAGNOSIS — E87.6 HYPOKALEMIA: ICD-10-CM

## 2019-04-08 DIAGNOSIS — M79.89 LEG SWELLING: Primary | ICD-10-CM

## 2019-04-08 LAB
ALBUMIN SERPL-MCNC: 3.8 G/DL (ref 3.5–5)
ALBUMIN/GLOB SERPL: 1 {RATIO} (ref 1.1–2.2)
ALP SERPL-CCNC: 108 U/L (ref 45–117)
ALT SERPL-CCNC: 45 U/L (ref 12–78)
ANION GAP SERPL CALC-SCNC: 7 MMOL/L (ref 5–15)
AST SERPL-CCNC: 26 U/L (ref 15–37)
BASOPHILS # BLD: 0.1 K/UL (ref 0–0.1)
BASOPHILS NFR BLD: 1 % (ref 0–1)
BILIRUB SERPL-MCNC: 0.3 MG/DL (ref 0.2–1)
BNP SERPL-MCNC: 30 PG/ML
BUN SERPL-MCNC: 11 MG/DL (ref 6–20)
BUN/CREAT SERPL: 15 (ref 12–20)
CALCIUM SERPL-MCNC: 8.9 MG/DL (ref 8.5–10.1)
CHLORIDE SERPL-SCNC: 106 MMOL/L (ref 97–108)
CK SERPL-CCNC: 57 U/L (ref 26–192)
CO2 SERPL-SCNC: 27 MMOL/L (ref 21–32)
CREAT SERPL-MCNC: 0.74 MG/DL (ref 0.55–1.02)
D DIMER PPP FEU-MCNC: 0.31 MG/L FEU (ref 0–0.65)
DIFFERENTIAL METHOD BLD: ABNORMAL
EOSINOPHIL # BLD: 0.2 K/UL (ref 0–0.4)
EOSINOPHIL NFR BLD: 2 % (ref 0–7)
ERYTHROCYTE [DISTWIDTH] IN BLOOD BY AUTOMATED COUNT: 14.2 % (ref 11.5–14.5)
GLOBULIN SER CALC-MCNC: 3.8 G/DL (ref 2–4)
GLUCOSE SERPL-MCNC: 121 MG/DL (ref 65–100)
HCT VFR BLD AUTO: 38 % (ref 35–47)
HGB BLD-MCNC: 12.2 G/DL (ref 11.5–16)
IMM GRANULOCYTES # BLD AUTO: 0.1 K/UL (ref 0–0.04)
IMM GRANULOCYTES NFR BLD AUTO: 1 % (ref 0–0.5)
LYMPHOCYTES # BLD: 1.9 K/UL (ref 0.8–3.5)
LYMPHOCYTES NFR BLD: 25 % (ref 12–49)
MCH RBC QN AUTO: 25.7 PG (ref 26–34)
MCHC RBC AUTO-ENTMCNC: 32.1 G/DL (ref 30–36.5)
MCV RBC AUTO: 80 FL (ref 80–99)
MONOCYTES # BLD: 0.5 K/UL (ref 0–1)
MONOCYTES NFR BLD: 7 % (ref 5–13)
NEUTS SEG # BLD: 5 K/UL (ref 1.8–8)
NEUTS SEG NFR BLD: 64 % (ref 32–75)
NRBC # BLD: 0 K/UL (ref 0–0.01)
NRBC BLD-RTO: 0 PER 100 WBC
PLATELET # BLD AUTO: 252 K/UL (ref 150–400)
PMV BLD AUTO: 11.2 FL (ref 8.9–12.9)
POTASSIUM SERPL-SCNC: 3.2 MMOL/L (ref 3.5–5.1)
PROT SERPL-MCNC: 7.6 G/DL (ref 6.4–8.2)
RBC # BLD AUTO: 4.75 M/UL (ref 3.8–5.2)
SODIUM SERPL-SCNC: 140 MMOL/L (ref 136–145)
TROPONIN I SERPL-MCNC: <0.05 NG/ML
WBC # BLD AUTO: 7.7 K/UL (ref 3.6–11)

## 2019-04-08 PROCEDURE — 85379 FIBRIN DEGRADATION QUANT: CPT

## 2019-04-08 PROCEDURE — 71046 X-RAY EXAM CHEST 2 VIEWS: CPT

## 2019-04-08 PROCEDURE — 82550 ASSAY OF CK (CPK): CPT

## 2019-04-08 PROCEDURE — 83880 ASSAY OF NATRIURETIC PEPTIDE: CPT

## 2019-04-08 PROCEDURE — 74011250637 HC RX REV CODE- 250/637: Performed by: EMERGENCY MEDICINE

## 2019-04-08 PROCEDURE — 80053 COMPREHEN METABOLIC PANEL: CPT

## 2019-04-08 PROCEDURE — 36415 COLL VENOUS BLD VENIPUNCTURE: CPT

## 2019-04-08 PROCEDURE — 84484 ASSAY OF TROPONIN QUANT: CPT

## 2019-04-08 PROCEDURE — 99284 EMERGENCY DEPT VISIT MOD MDM: CPT

## 2019-04-08 PROCEDURE — 85025 COMPLETE CBC W/AUTO DIFF WBC: CPT

## 2019-04-08 PROCEDURE — 93005 ELECTROCARDIOGRAM TRACING: CPT

## 2019-04-08 RX ORDER — POTASSIUM CHLORIDE 20 MEQ/1
40 TABLET, EXTENDED RELEASE ORAL
Status: COMPLETED | OUTPATIENT
Start: 2019-04-08 | End: 2019-04-08

## 2019-04-08 RX ADMIN — POTASSIUM CHLORIDE 40 MEQ: 20 TABLET, EXTENDED RELEASE ORAL at 22:29

## 2019-04-09 LAB
ATRIAL RATE: 94 BPM
CALCULATED P AXIS, ECG09: 46 DEGREES
CALCULATED R AXIS, ECG10: 11 DEGREES
CALCULATED T AXIS, ECG11: 48 DEGREES
DIAGNOSIS, 93000: NORMAL
P-R INTERVAL, ECG05: 170 MS
Q-T INTERVAL, ECG07: 374 MS
QRS DURATION, ECG06: 112 MS
QTC CALCULATION (BEZET), ECG08: 467 MS
VENTRICULAR RATE, ECG03: 94 BPM

## 2019-04-09 NOTE — ED PROVIDER NOTES
EMERGENCY DEPARTMENT HISTORY AND PHYSICAL EXAM 
     
 
Date: 4/8/2019 Patient Name: Jose Raul Pinto History of Presenting Illness Chief Complaint Patient presents with  Numbness Bilateral hand tingling since Sat  Leg Swelling Bilateral leg swellling since Sunday, pressure and tightness reported.  Chest Pain CP intermittent, yesterday and one episode today. Pain at left sternal border thru to back History Provided By: Patient HPI: Jose Raul Pinto is a 47 y.o. female, pmhx breast CA status post mastectomy bilaterally, depression, anemia, anxiety, who presents dilatory to the ED c/o extremity edema and bilateral hand paresthesias Patient notes she was out of town over the weekend at [facility] where she was carrying heavy boxes and standing for multiple hours on end. She is used to some paresthesias in her right fourth and fifth digit secondary to cubital tunnel syndrome however today she notes she had paresthesias on all fingertips as well as swelling of bilateral hands. She also notes she is having a lot of swelling in both legs although the left is worse than right as she is able to push dents into her left lower extremity. She denies any shortness of breath but notes her left chest muscles were sore yesterday after carrying heavy boxes. She denies any current palpitations or chest pain. Patient specifically denies any recent fevers, chills, nausea, vomiting, diarrhea, abd pain, CP, SOB, urinary sxs, changes in BM, or headache. PCP: Tom Schlatter, MD 
 
Allergies: Multiple extensive list 
Social Hx: -tobacco, -EtOH, -Illicit Drugs There are no other complaints, changes, or physical findings at this time. Current Outpatient Medications Medication Sig Dispense Refill  
 HYDROcodone-acetaminophen (NORCO) 5-325 mg per tablet Take 1 Tab by mouth every eight (8) hours as needed for Pain for up to 99 days.  Max Daily Amount: 3 Tabs. 21 Tab 0  
 PREVIDENT 5000 BOOSTER PLUS 1.1 % pste USE ONCE A DAY, PREFERABLY AT BEDTIME  4  
 zolpidem CR (AMBIEN CR) 6.25 mg tablet TAKE 1 TABLET BY MOUTH EVERY EVENING AS NEEDED FOR SLEEP 90 Tab 1  ALPRAZolam (XANAX) 0.25 mg tablet Take 1.5 Tabs by mouth daily as needed for Anxiety. 45 Tab 1  
 triamcinolone (KENALOG) 0.1 % lotion Apply  to affected area two (2) times a day. use thin layer 60 mL 0  
 desonide (TRIDESILON) 0.05 % topical lotion Apply  to affected area two (2) times daily as needed for Skin Irritation. 59 mL 0  
 metoprolol succinate (TOPROL XL) 25 mg XL tablet Take 0.5 Tabs by mouth daily. 45 Tab 3  
 ergocalciferol (VITAMIN D2) 50,000 unit capsule Take 1 Cap by mouth every seven (7) days. 13 Cap 3  celecoxib (CELEBREX) 200 mg capsule TAKE 1 CAPSULE TWICE A DAY AS NEEDED 180 Cap 3  pantoprazole (PROTONIX) 40 mg tablet Take 1 Tab by mouth daily. 30 Tab 11  
 EPINEPHrine (EPIPEN 2-JULIAN) 0.3 mg/0.3 mL injection 0.3 mL by IntraMUSCular route once as needed for up to 1 dose. 0.6 mL 1  
 mometasone (NASONEX) 50 mcg/actuation nasal spray 2 Sprays by Both Nostrils route as needed. 1 Container 11  
 albuterol sulfate (PROAIR RESPICLICK) 90 mcg/actuation aepb Take 2 Puffs by inhalation every four (4) hours as needed. 1 Inhaler 0  
 pilocarpine (SALAGEN) 5 mg tablet Take 1 tab at bedtime  2  
 diphenhydrAMINE (BENADRYL) 25 mg capsule Take 50 mg by mouth every six (6) hours as needed. Indications: ALLERGIC REACTIONS  loratadine (CLARITIN) 10 mg tablet Take 10 mg by mouth daily as needed.  cetirizine (ZYRTEC) 10 mg tablet Take 10 mg by mouth nightly. Past History Past Medical History: 
Past Medical History:  
Diagnosis Date  Anemia 10/3/13 \"SINCE I WAS A KID\"  Anxiety DURING CHEMO; PT STAYS XANAX AS OF 10/3/13  Cancer [facility]) [id number]  
 breast,chemo ended 10/2012  Chronic pain   
 si joints sacrum  Depression  Nausea & vomiting   
 used patch in past with good results  Other ill-defined conditions(799.89)   
 anemia,chronic diarrhea, TMJ  
 Ovarian cyst   
 Palpitations TAKES METOPROLOL FOR TACHYCARDIA  PUD (peptic ulcer disease)   
 occurred at age 33 .  Unspecified adverse effect of anesthesia TMJ Past Surgical History: 
Past Surgical History:  
Procedure Laterality Date  BREAST SURGERY PROCEDURE UNLISTED    
 breast biopsy x3 left breast  
 HX BREAST RECONSTRUCTION  7/10/2013 BREAST NIPPLE RECONSTRUCTION REVISION performed by Steven Valdez MD at [address] HX BREAST RECONSTRUCTION  2012 REINCISION LEFT BREAST FOR REMAINDER OF BREAST CA  
 HX BREAST RECONSTRUCTION  2012 R SEROMA EXCISED, REPLACE IMPLANT R BREAST [facility]  HX ENDOSCOPY [facility] HX GI    
 anal fissure repair  HX GYN  10/21/13 TLH/BSO  HX HYSTERECTOMY  10/2013 Dr. Juanito Sosa  HX MASTECTOMY  2012  
 double  HX OTHER SURGICAL DENTAL SURGERIES WITH SEDATION  
 HX VASCULAR ACCESS  2012 PORTACATH INSERTION  
 HX VASCULAR ACCESS  2012 PORTCATH REMOVAL  LAMINOTOMY  2010  [place] COLONOSCOPY FLX DX W/COLLJ SPEC WHEN PFRMD  2011 Dr Ferrara Family History: 
Family History Problem Relation Age of Onset Marcial Wolf Mother 64 Bull Díaz Mother 64  OF LUNG CA  
 Hypertension Father  Diabetes Father  Emphysema Father  COPD Father  Heart Attack Sister 50  
 Heart Disease Sister  Breast Cancer Sister  No Known Problems Brother  Anxiety Brother  No Known Problems Sister  Breast Cancer Paternal Grandmother  Malignant Hyperthermia Neg Hx  Pseudocholinesterase Deficiency Neg Hx  Delayed Awakening Neg Hx  Post-op Nausea/Vomiting Neg Hx  Emergence Delirium Neg Hx  Post-op Cognitive Dysfunction Neg Hx   
 Other Neg Hx Social History: 
Social History Tobacco Use  Smoking status: Never Smoker  Smokeless tobacco: Never Used Substance Use Topics  Alcohol use: No  
  Alcohol/week: 0.0 oz  Drug use: No  
 
 
Allergies: Allergies Allergen Reactions  Other Food Other (comments) PT STATES OTHER FOOD ALLERGIES WITH VARYING REACTIONS:  PORK, LETTUCE, LIMA BEANS, BLACK PEPPER, TEA, MALT, WALNUTS.  Beef Containing Products Anaphylaxis  Food Extracts Anaphylaxis Beef, milk, peanuts  Milk Anaphylaxis  Peanut Anaphylaxis  Alpha-D-Galactosidase Diarrhea  Azithromycin Nausea and Vomiting  Erythromycin Nausea and Vomiting  Guaifenesin Nausea and Vomiting  Morphine Nausea and Vomiting Projectile vomiting per patient  Other Medication Other (comments) WHITE CELL STIMULATOR INJECTION (NEULASTIN?) CAUSED SEVERE BONE PAIN LASTING A COUPLE WEEKS. Review of Systems Review of Systems Constitutional: Negative for activity change, appetite change, chills, fever and unexpected weight change. HENT: Negative for congestion. Eyes: Negative for pain and visual disturbance. Respiratory: Negative for cough and shortness of breath. Cardiovascular: Positive for leg swelling. Negative for chest pain and palpitations. Gastrointestinal: Negative for abdominal pain, diarrhea, nausea and vomiting. Genitourinary: Negative for dysuria. Musculoskeletal: Negative for back pain. Skin: Negative for rash. Neurological: Positive for numbness. Negative for headaches. Physical Exam  
Physical Exam  
Constitutional: She is oriented to person, place, and time. She appears well-developed and well-nourished. This is a well-appearing obese female in minimal acute distress. HENT:  
Head: Normocephalic and atraumatic. Mouth/Throat: Oropharynx is clear and moist.  
Eyes: Pupils are equal, round, and reactive to light.  Conjunctivae and EOM are normal. Right eye exhibits no discharge. Left eye exhibits no discharge. Neck: Normal range of motion. Neck supple. Cardiovascular: Normal rate, regular rhythm and normal heart sounds. No murmur heard. Pulmonary/Chest: Effort normal and breath sounds normal. No respiratory distress. She has no wheezes. She has no rales. Abdominal: Soft. Bowel sounds are normal. She exhibits no distension. There is no tenderness. Musculoskeletal: Normal range of motion. She exhibits edema (Bilateral lower extremity 1+ pitting edema although appears asymmetric and slightly worse on the left side. ). Neurological: She is alert and oriented to person, place, and time. No cranial nerve deficit. She exhibits normal muscle tone. Neurologic exam without acute abnormality Skin: Skin is warm and dry. No rash noted. She is not diaphoretic. Nursing note and vitals reviewed. Diagnostic Study Results Labs - Recent Results (from the past 12 hour(s)) EKG, 12 LEAD, INITIAL Collection Time: 04/08/19  6:14 PM  
Result Value Ref Range Ventricular Rate 94 BPM  
 Atrial Rate 94 BPM  
 P-R Interval 170 ms QRS Duration 112 ms  
 Q-T Interval 374 ms QTC Calculation (Bezet) 467 ms Calculated P Axis 46 degrees Calculated R Axis 11 degrees Calculated T Axis 48 degrees Diagnosis Normal sinus rhythm Incomplete right bundle branch block When compared with ECG of 26-JUN-2013 15:26, No significant change was found CBC WITH AUTOMATED DIFF Collection Time: 04/08/19  9:06 PM  
Result Value Ref Range WBC 7.7 3.6 - 11.0 K/uL  
 RBC 4.75 3.80 - 5.20 M/uL  
 HGB 12.2 11.5 - 16.0 g/dL HCT 38.0 35.0 - 47.0 % MCV 80.0 80.0 - 99.0 FL  
 MCH 25.7 (L) 26.0 - 34.0 PG  
 MCHC 32.1 30.0 - 36.5 g/dL  
 RDW 14.2 11.5 - 14.5 % PLATELET 521 919 - 255 K/uL MPV 11.2 8.9 - 12.9 FL  
 NRBC 0.0 0  WBC ABSOLUTE NRBC 0.00 0.00 - 0.01 K/uL NEUTROPHILS 64 32 - 75 % LYMPHOCYTES 25 12 - 49 % MONOCYTES 7 5 - 13 % EOSINOPHILS 2 0 - 7 % BASOPHILS 1 0 - 1 % IMMATURE GRANULOCYTES 1 (H) 0.0 - 0.5 % ABS. NEUTROPHILS 5.0 1.8 - 8.0 K/UL  
 ABS. LYMPHOCYTES 1.9 0.8 - 3.5 K/UL  
 ABS. MONOCYTES 0.5 0.0 - 1.0 K/UL  
 ABS. EOSINOPHILS 0.2 0.0 - 0.4 K/UL  
 ABS. BASOPHILS 0.1 0.0 - 0.1 K/UL  
 ABS. IMM. GRANS. 0.1 (H) 0.00 - 0.04 K/UL  
 DF AUTOMATED    
D DIMER Collection Time: 04/08/19  9:06 PM  
Result Value Ref Range D-dimer 0.31 0.00 - 0.65 mg/L FEU METABOLIC PANEL, COMPREHENSIVE Collection Time: 04/08/19  9:06 PM  
Result Value Ref Range Sodium 140 136 - 145 mmol/L Potassium 3.2 (L) 3.5 - 5.1 mmol/L Chloride 106 97 - 108 mmol/L  
 CO2 27 21 - 32 mmol/L Anion gap 7 5 - 15 mmol/L Glucose 121 (H) 65 - 100 mg/dL BUN 11 6 - 20 MG/DL Creatinine 0.74 0.55 - 1.02 MG/DL  
 BUN/Creatinine ratio 15 12 - 20 GFR est AA >60 >60 ml/min/1.73m2 GFR est non-AA >60 >60 ml/min/1.73m2 Calcium 8.9 8.5 - 10.1 MG/DL Bilirubin, total 0.3 0.2 - 1.0 MG/DL  
 ALT (SGPT) 45 12 - 78 U/L  
 AST (SGOT) 26 15 - 37 U/L Alk. phosphatase 108 45 - 117 U/L Protein, total 7.6 6.4 - 8.2 g/dL Albumin 3.8 3.5 - 5.0 g/dL Globulin 3.8 2.0 - 4.0 g/dL A-G Ratio 1.0 (L) 1.1 - 2.2 CK Collection Time: 04/08/19  9:06 PM  
Result Value Ref Range CK 57 26 - 192 U/L  
TROPONIN I Collection Time: 04/08/19  9:06 PM  
Result Value Ref Range Troponin-I, Qt. <0.05 <0.05 ng/mL NT-PRO BNP Collection Time: 04/08/19  9:06 PM  
Result Value Ref Range NT pro-BNP 30 <125 PG/ML Radiologic Studies -  
XR CHEST PA LAT Final Result Impression: No acute cardiopulmonary process. CT Results  (Last 48 hours) None CXR Results  (Last 48 hours) 04/08/19 1842  XR CHEST PA LAT Final result Impression:  Impression: No acute cardiopulmonary process.   
  
 Narrative:  Chest PA and lateral  
   
 History: Short of breath Comparison: 1/25/2018 Findings: The lungs are well expanded. No focal consolidation, pleural  
effusion, or pneumothorax. The cardiomediastinal silhouette is unremarkable. The visualized osseous structures are unremarkable. Medical Decision Making I am the first provider for this patient. I reviewed the vital signs, available nursing notes, past medical history, past surgical history, family history and social history. Vital Signs-Reviewed the patient's vital signs. Patient Vitals for the past 12 hrs: 
 Temp Pulse Resp BP SpO2  
04/08/19 2217 97.2 °F (36.2 °C) 82 17 (!) 136/92 95 % 04/08/19 1942 98.2 °F (36.8 °C) 89  142/87 94 % 04/08/19 1804 98.2 °F (36.8 °C) 95 16 (!) 169/94 97 % Pulse Oximetry Analysis - 95% on RA Cardiac Monitor:  
Rate: 82bpm 
Rhythm: Normal Sinus Rhythm Records Reviewed: Nursing Notes and Old Medical Records Provider Notes (Medical Decision Making): MDM: Extensive evaluation ordered from triage including d-dimer lab testing. Patient currently without concern for acute PE, dissection. Await lab results previously ordered. ED Course:  
Initial assessment performed. The patients presenting problems have been discussed, and they are in agreement with the care plan formulated and outlined with them. I have encouraged them to ask questions as they arise throughout their visit. PROGRESS NOTE: 
8625 Pt continues to appear well without concern for acute illness, PE, dissection. Discussed results including normal d-dimer and low potassium. Recommend increasing potassium rich foods and getting a over-the-counter potassium supplement as well as follow-up with primary care for repeat potassium testing this week. Critical Care Time:  
0 Diagnosis Clinical Impression: 1. Leg swelling 2. Hypokalemia PLAN: 
1. Discharge Medication List as of 4/8/2019 10:19 PM  
  
 
2. Follow-up Information Follow up With Specialties Details Why Contact Info Shankar Velazquez MD Family Practice Schedule an appointment as soon as possible for a visit for symptom recheck [address] [place] [address] [id number] [phone] Return to ED if worse Disposition: 
Home

## 2019-04-09 NOTE — DISCHARGE INSTRUCTIONS
Patient Education        Hypokalemia: Care Instructions  Your Care Instructions    Hypokalemia (say \"hj-wt-lmt-ARACELIS-coy-uh\") is a low level of potassium. The heart, muscles, kidneys, and nervous system all need potassium to work well. This problem has many different causes. Kidney problems, diet, and medicines like diuretics and laxatives can cause it. So can vomiting or diarrhea. In some cases, cancer is the cause. Your doctor may do tests to find the cause of your low potassium levels. You may need medicines to bring your potassium levels back to normal. You may also need regular blood tests to check your potassium. If you have very low potassium, you may need intravenous (IV) medicines. You also may need tests to check the electrical activity of your heart. Heart problems caused by low potassium levels can be very serious. Follow-up care is a key part of your treatment and safety. Be sure to make and go to all appointments, and call your doctor if you are having problems. It's also a good idea to know your test results and keep a list of the medicines you take. How can you care for yourself at home? · If your doctor recommends it, eat foods that have a lot of potassium. These include fresh fruits, juices, and vegetables. They also include nuts, beans, and milk. · Be safe with medicines. If your doctor prescribes medicines or potassium supplements, take them exactly as directed. Call your doctor if you have any problems with your medicines. · Get your potassium levels tested as often as your doctor tells you. When should you call for help? Call 911 anytime you think you may need emergency care. For example, call if:    · You feel like your heart is missing beats.  Heart problems caused by low potassium can cause death.     · You passed out (lost consciousness).     · You have a seizure.    Call your doctor now or seek immediate medical care if:    · You feel weak or unusually tired.     · You have severe arm or leg cramps.     · You have tingling or numbness.     · You feel sick to your stomach, or you vomit.     · You have belly cramps.     · You feel bloated or constipated.     · You have to urinate a lot.     · You feel very thirsty most of the time.     · You are dizzy or lightheaded, or you feel like you may faint.     · You feel depressed, or you lose touch with reality.    Watch closely for changes in your health, and be sure to contact your doctor if:    · You do not get better as expected. Where can you learn more? Go to http://linn-eboni.info/. Enter G358 in the search box to learn more about \"Hypokalemia: Care Instructions. \"  Current as of: March 14, 2018  Content Version: 11.9  © 2068-1467 [employer]. Care instructions adapted under license by [employer] (which disclaims liability or warranty for this information). If you have questions about a medical condition or this instruction, always ask your healthcare professional. [address] any warranty or liability for your use of this information.

## 2019-04-15 ENCOUNTER — TELEPHONE (OUTPATIENT)
Dept: FAMILY MEDICINE CLINIC | Age: 54
End: 2019-04-15

## 2019-04-15 NOTE — TELEPHONE ENCOUNTER
----- Message from Sheryl Walls sent at 4/12/2019  3:23 PM EDT -----  Regarding: Dr. Abhishek Farah  Patient needs an appointment for a ER follow up. Her number is [phone].

## 2019-04-22 ENCOUNTER — OFFICE VISIT (OUTPATIENT)
Dept: FAMILY MEDICINE CLINIC | Age: 54
End: 2019-04-22

## 2019-04-22 VITALS
TEMPERATURE: 96.4 F | SYSTOLIC BLOOD PRESSURE: 139 MMHG | RESPIRATION RATE: 16 BRPM | HEIGHT: 60 IN | BODY MASS INDEX: 39.34 KG/M2 | WEIGHT: 200.4 LBS | HEART RATE: 76 BPM | DIASTOLIC BLOOD PRESSURE: 82 MMHG | OXYGEN SATURATION: 97 %

## 2019-04-22 DIAGNOSIS — E87.6 HYPOKALEMIA: ICD-10-CM

## 2019-04-22 DIAGNOSIS — E66.9 NON MORBID OBESITY: ICD-10-CM

## 2019-04-22 DIAGNOSIS — F51.01 PRIMARY INSOMNIA: ICD-10-CM

## 2019-04-22 DIAGNOSIS — M79.89 LEG SWELLING: ICD-10-CM

## 2019-04-22 DIAGNOSIS — R20.2 PARESTHESIA OF BOTH HANDS: Primary | ICD-10-CM

## 2019-04-22 DIAGNOSIS — R00.2 PALPITATIONS: ICD-10-CM

## 2019-04-22 DIAGNOSIS — R73.02 IGT (IMPAIRED GLUCOSE TOLERANCE): ICD-10-CM

## 2019-04-22 DIAGNOSIS — R63.5 RECENT WEIGHT GAIN: ICD-10-CM

## 2019-04-22 LAB — HBA1C MFR BLD HPLC: 6 %

## 2019-04-22 RX ORDER — ZOLPIDEM TARTRATE 6.25 MG/1
TABLET, FILM COATED, EXTENDED RELEASE ORAL
Qty: 90 TAB | Refills: 1 | Status: SHIPPED | OUTPATIENT
Start: 2019-04-22 | End: 2019-10-22 | Stop reason: SDUPTHER

## 2019-04-22 NOTE — PATIENT INSTRUCTIONS
You have been scheduled to see Dr Kalyani St on 5/20/19 at 9:00am.  Please arrive 15 minutes prior to your appointment time. The number to his office is [phone] if you need to reschedule. The address to the office is [address] [place] [address] At [address] [place] [address].

## 2019-04-22 NOTE — PROGRESS NOTES
HISTORY OF PRESENT ILLNESS  Denise Willett is a 47 y.o. female. HPI   Follow up ER for numbness and tingling in the hands. Was told d/t her potassium level being low. Overall the tingling is better and at a minimum. Also had increased leg swelling. Thinks this was d/t several days of standing at work which she normally does not have to do. Leg swelling is also better. Increased palpitations also noted. HR has been up and down too low. She stopped the metoprolol d/t her her HR being too low. No chest pain or tightness noted. No SOB. Denies caffeine intake. Was to have stress echo on last year but never followed up with the appt. She is concerned about the weight gain and weight in her abd. One of her friends told her she has a moon face like a \"Cushing person\". She also has had increase fatigue and facial hair. No recent use of steroids. Patient Active Problem List   Diagnosis Code    Palpitations R00.2    Depression F32.9    Environmental allergies Z91.09    Breast cancer, stage 1 ([facility] [address] C50.919    GERD (gastroesophageal reflux disease) K21.9    Insomnia G47.00    Essential hypertension I10    ADD (attention deficit disorder) F98.8    Encounter for medication monitoring Z51.81    Severe obesity (BMI 35.0-39. 9) with comorbidity (HCC) E66.01       Current Outpatient Medications   Medication Sig Dispense Refill    zolpidem CR (AMBIEN CR) 6.25 mg tablet TAKE 1 TABLET BY MOUTH EVERY EVENING AS NEEDED FOR SLEEP 90 Tab 1    HYDROcodone-acetaminophen (NORCO) 5-325 mg per tablet Take 1 Tab by mouth every eight (8) hours as needed for Pain for up to 99 days. Max Daily Amount: 3 Tabs. 21 Tab 0    PREVIDENT 5000 BOOSTER PLUS 1.1 % pste USE ONCE A DAY, PREFERABLY AT BEDTIME  4    ALPRAZolam (XANAX) 0.25 mg tablet Take 1.5 Tabs by mouth daily as needed for Anxiety. 45 Tab 1    triamcinolone (KENALOG) 0.1 % lotion Apply  to affected area two (2) times a day.  use thin layer 60 mL 0  desonide (TRIDESILON) 0.05 % topical lotion Apply  to affected area two (2) times daily as needed for Skin Irritation. 59 mL 0    ergocalciferol (VITAMIN D2) 50,000 unit capsule Take 1 Cap by mouth every seven (7) days. 13 Cap 3    celecoxib (CELEBREX) 200 mg capsule TAKE 1 CAPSULE TWICE A DAY AS NEEDED 180 Cap 3    pantoprazole (PROTONIX) 40 mg tablet Take 1 Tab by mouth daily. 30 Tab 11    EPINEPHrine (EPIPEN 2-JULIAN) 0.3 mg/0.3 mL injection 0.3 mL by IntraMUSCular route once as needed for up to 1 dose. 0.6 mL 1    mometasone (NASONEX) 50 mcg/actuation nasal spray 2 Sprays by Both Nostrils route as needed. 1 Container 11    albuterol sulfate (PROAIR RESPICLICK) 90 mcg/actuation aepb Take 2 Puffs by inhalation every four (4) hours as needed. 1 Inhaler 0    pilocarpine (SALAGEN) 5 mg tablet Take 1 tab at bedtime  2    diphenhydrAMINE (BENADRYL) 25 mg capsule Take 50 mg by mouth every six (6) hours as needed. Indications: ALLERGIC REACTIONS      loratadine (CLARITIN) 10 mg tablet Take 10 mg by mouth daily as needed.  cetirizine (ZYRTEC) 10 mg tablet Take 10 mg by mouth nightly. Allergies   Allergen Reactions    Other Food Other (comments)     PT STATES OTHER FOOD ALLERGIES WITH VARYING REACTIONS:  PORK, LETTUCE, LIMA BEANS, BLACK PEPPER, TEA, MALT, WALNUTS.  Beef Containing Products Anaphylaxis    Food Extracts Anaphylaxis     Beef, milk, peanuts    Milk Anaphylaxis    Peanut Anaphylaxis    Alpha-D-Galactosidase Diarrhea    Azithromycin Nausea and Vomiting    Erythromycin Nausea and Vomiting    Guaifenesin Nausea and Vomiting    Morphine Nausea and Vomiting     Projectile vomiting per patient    Other Medication Other (comments)     WHITE CELL STIMULATOR INJECTION (NEULASTIN?) CAUSED SEVERE BONE PAIN LASTING A COUPLE WEEKS.        Past Medical History:   Diagnosis Date    Anemia 10/3/13    \"SINCE I WAS A KID\"    Anxiety     DURING CHEMO; PT STAYS XANAX AS OF 10/3/13    Cancer [facility]) [id number]    breast,chemo ended 10/2012    Chronic pain     si joints sacrum    Depression     Nausea & vomiting     used patch in past with good results    Other ill-defined conditions(799.89)     anemia,chronic diarrhea, TMJ    Ovarian cyst     Palpitations     TAKES METOPROLOL FOR TACHYCARDIA    PUD (peptic ulcer disease)     occurred at age 33 .     Unspecified adverse effect of anesthesia     TMJ       Past Surgical History:   Procedure Laterality Date    BREAST SURGERY PROCEDURE UNLISTED      breast biopsy x3 left breast    HX BREAST RECONSTRUCTION  7/10/2013    BREAST NIPPLE RECONSTRUCTION REVISION performed by Trina Ramírez [facility]    HX BREAST RECONSTRUCTION  2012    REINCISION LEFT BREAST FOR REMAINDER OF BREAST CA    HX BREAST RECONSTRUCTION  2012    R SEROMA EXCISED, REPLACE IMPLANT R BREAST    HX  SECTION      HX ENDOSCOPY      [facility] HX GI      anal fissure repair    HX GYN  10/21/13    TLH/BSO    HX HYSTERECTOMY  10/2013    Dr. Zach Rahman    HX MASTECTOMY  2012    double    HX OTHER SURGICAL      DENTAL SURGERIES WITH SEDATION    HX VASCULAR ACCESS  2012    PORTACATH INSERTION    HX VASCULAR ACCESS  2012    PORTCATH REMOVAL    LAMINOTOMY  2010    NV COLONOSCOPY FLX DX W/COLLJ SPEC WHEN PFRMD  2011    Dr Cherri Almonte       Family History   Problem Relation Age of Onset    Breast Cancer Mother 64    Cancer Mother 64         OF LUNG CA    Hypertension Father     Diabetes Father     Emphysema Father     COPD Father     Heart Attack Sister 50    Heart Disease Sister     Breast Cancer Sister     No Known Problems Brother     Anxiety Brother     No Known Problems Sister     Breast Cancer Paternal Grandmother     Malignant Hyperthermia Neg Hx     Pseudocholinesterase Deficiency Neg Hx     Delayed Awakening Neg Hx     Post-op Nausea/Vomiting Neg Hx     Emergence Delirium Neg Hx     Post-op Cognitive Dysfunction Neg Hx     Other Neg Hx        Social History     Tobacco Use    Smoking status: Never Smoker    Smokeless tobacco: Never Used   Substance Use Topics    Alcohol use: No     Alcohol/week: 0.0 oz        Lab Results   Component Value Date/Time    WBC 7.7 04/08/2019 09:06 PM    HGB 12.2 04/08/2019 09:06 PM    HCT 38.0 04/08/2019 09:06 PM    PLATELET 226 01/49/9194 09:06 PM    MCV 80.0 04/08/2019 09:06 PM     Lab Results   Component Value Date/Time    Cholesterol, total 221 (H) 07/17/2018 03:50 PM    HDL Cholesterol 42 07/17/2018 03:50 PM    LDL, calculated 148 (H) 07/17/2018 03:50 PM    LDL-C, External 139 05/21/2015 01:33 PM    Triglyceride 153 (H) 07/17/2018 03:50 PM    CHOL/HDL Ratio 3.6 12/15/2009 02:10 PM     Lab Results   Component Value Date/Time    Glucose 121 (H) 04/08/2019 09:06 PM      Lab Results   Component Value Date/Time    Sodium 140 04/08/2019 09:06 PM    Potassium 3.2 (L) 04/08/2019 09:06 PM    Chloride 106 04/08/2019 09:06 PM    CO2 27 04/08/2019 09:06 PM    Anion gap 7 04/08/2019 09:06 PM    Glucose 121 (H) 04/08/2019 09:06 PM    BUN 11 04/08/2019 09:06 PM    Creatinine 0.74 04/08/2019 09:06 PM    BUN/Creatinine ratio 15 04/08/2019 09:06 PM    GFR est AA >60 04/08/2019 09:06 PM    GFR est non-AA >60 04/08/2019 09:06 PM    Calcium 8.9 04/08/2019 09:06 PM    Bilirubin, total 0.3 04/08/2019 09:06 PM    ALT (SGPT) 45 04/08/2019 09:06 PM    AST (SGOT) 26 04/08/2019 09:06 PM    Alk. phosphatase 108 04/08/2019 09:06 PM    Protein, total 7.6 04/08/2019 09:06 PM    Albumin 3.8 04/08/2019 09:06 PM    Globulin 3.8 04/08/2019 09:06 PM    A-G Ratio 1.0 (L) 04/08/2019 09:06 PM      Lab Results   Component Value Date/Time    Hemoglobin A1c (POC) 6.0 04/22/2019 09:50 AM    Hemoglobin A1c, External 5.7 05/21/2015 01:33 PM     d/t    Review of Systems   Constitutional: Negative for malaise/fatigue. HENT: Negative for congestion. Eyes: Negative for blurred vision.    Respiratory: Negative for cough and shortness of breath. Cardiovascular: Negative for chest pain, palpitations and leg swelling. Gastrointestinal: Negative for abdominal pain, constipation and heartburn. Genitourinary: Negative for dysuria, frequency and urgency. Musculoskeletal: Negative for back pain and joint pain. Neurological: Negative for dizziness, tingling and headaches. Endo/Heme/Allergies: Negative for environmental allergies. Psychiatric/Behavioral: Negative for depression. The patient does not have insomnia. Physical Exam   Constitutional: She appears well-developed and well-nourished. /82 (BP 1 Location: Left arm, BP Patient Position: Sitting)   Pulse 76   Temp 96.4 °F (35.8 °C) (Oral)   Resp 16   Ht 5' (1.524 m)   Wt 200 lb 6.4 oz (90.9 kg)   LMP 08/03/2012   SpO2 97%   BMI 39.14 kg/m²   She does have a round face noted. HENT:   Right Ear: Tympanic membrane and ear canal normal.   Left Ear: Tympanic membrane and ear canal normal.   Nose: No mucosal edema or rhinorrhea. Mouth/Throat: Oropharynx is clear and moist and mucous membranes are normal.   Neck: Normal range of motion. Neck supple. No thyromegaly present. Cardiovascular: Normal rate, regular rhythm and normal heart sounds. Pulmonary/Chest: Effort normal and breath sounds normal.   Abdominal: Soft. Normal appearance and bowel sounds are normal. She exhibits no mass. There is no tenderness. Musculoskeletal: Normal range of motion. She exhibits edema (mild). Right hand: She exhibits no swelling. Normal sensation noted. Normal strength noted. Left hand: She exhibits no tenderness, no bony tenderness and no swelling. Normal sensation noted. Normal strength noted. Lymphadenopathy:     She has no cervical adenopathy. Skin: Skin is warm and dry. Psychiatric: She has a normal mood and affect. Nursing note and vitals reviewed. ASSESSMENT and PLAN  Diagnoses and all orders for this visit:    1. Paresthesia of both hands  Improved. Recheck potassium level. 2. Hypokalemia  -     POTASSIUM    3. Leg swelling  Improved. Discussed knee hi compression stockings. 4. Palpitations  -     REFERRAL TO CARDIOLOGY    5. IGT (impaired glucose tolerance)  -     AMB POC HEMOGLOBIN D5L  -     METABOLIC PANEL, BASIC    6. Recent weight gain  -     FREE CORTISOL, URINE  -     CORTISOL, AM  -     T4, FREE  -     TSH 3RD GENERATION    7. Non morbid obesity  I have reviewed/discussed the above normal BMI with the patient. I have recommended the following interventions: dietary management education, guidance, and counseling . 8. Primary insomnia  -    Refill zolpidem CR (AMBIEN CR) 6.25 mg tablet; TAKE 1 TABLET BY MOUTH EVERY EVENING AS NEEDED FOR SLEEP        reviewed diet, exercise and weight control  cardiovascular risk and specific lipid/LDL goals reviewed  reviewed medications and side effects in detail    I have discussed diagnosis listed in this note with pt and/or family. I have discussed treatment plans and options and the risk/benefit analysis of those options, including safe use of medications and possible medication side effects. Through the use of shared decision making we have agreed to the above plan. The patient has received an after-visit summary and questions were answered concerning future plans and follow up. Advise pt of any urgent changes then to proceed to the ER.

## 2019-04-22 NOTE — PROGRESS NOTES
Chief Complaint   Patient presents with   [facility] Follow Up     Follow up on ED [facility] ER for leg swelling and numbness     Patient went to ER for bilateral hand swelling and numbness and for bilateral leg swelling. Patient states the swelling in the hands has gotten better and still c/o some numbness in hands. Patient states she has not been taking Toprol for about 2 weeks because her pulse drops on and off into the 40's. 1. Have you been to the ER, urgent care clinic since your last visit? Hospitalized since your last visit? Yes 4/8/2019 for both hands swelling and numbness and leg swelling    2. Have you seen or consulted any other health care providers outside of the [address] [place] [address] since your last visit? Include any pap smears or colon screening.  no

## 2019-04-23 LAB
BUN SERPL-MCNC: 12 MG/DL (ref 6–24)
BUN/CREAT SERPL: 16 (ref 9–23)
CALCIUM SERPL-MCNC: 9.4 MG/DL (ref 8.7–10.2)
CHLORIDE SERPL-SCNC: 102 MMOL/L (ref 96–106)
CO2 SERPL-SCNC: 21 MMOL/L (ref 20–29)
CORTIS AM PEAK SERPL-MCNC: 9.6 UG/DL (ref 6.2–19.4)
CREAT SERPL-MCNC: 0.73 MG/DL (ref 0.57–1)
GLUCOSE SERPL-MCNC: 115 MG/DL (ref 65–99)
POTASSIUM SERPL-SCNC: 4 MMOL/L (ref 3.5–5.2)
SODIUM SERPL-SCNC: 139 MMOL/L (ref 134–144)
T4 FREE SERPL-MCNC: 1.11 NG/DL (ref 0.82–1.77)
TSH SERPL DL<=0.005 MIU/L-ACNC: 3.61 UIU/ML (ref 0.45–4.5)

## 2019-04-27 LAB
CORTIS F 24H UR HPLC-MCNC: 2.4 UG/DL
CORTIS F 24H UR-MRATE: NORMAL UG/24 HR
CORTIS F/CREAT 24H UR: 17 UG/G
CREAT 24H UR-MRATE: NORMAL MG/24 HR
CREAT UR-MCNC: 145 MG/DL

## 2019-05-20 ENCOUNTER — OFFICE VISIT (OUTPATIENT)
Dept: CARDIOLOGY CLINIC | Age: 54
End: 2019-05-20

## 2019-05-20 VITALS
RESPIRATION RATE: 16 BRPM | WEIGHT: 198.3 LBS | DIASTOLIC BLOOD PRESSURE: 94 MMHG | OXYGEN SATURATION: 97 % | SYSTOLIC BLOOD PRESSURE: 164 MMHG | HEIGHT: 60 IN | BODY MASS INDEX: 38.93 KG/M2 | HEART RATE: 73 BPM

## 2019-05-20 DIAGNOSIS — R00.2 PALPITATIONS: ICD-10-CM

## 2019-05-20 DIAGNOSIS — R06.09 DOE (DYSPNEA ON EXERTION): ICD-10-CM

## 2019-05-20 DIAGNOSIS — I10 ESSENTIAL HYPERTENSION: Primary | ICD-10-CM

## 2019-05-20 NOTE — PROGRESS NOTES
Tamica Arevalo, [facility]    Subjective/HPI:     Ms. Riana Lombardo is a 47 y.o. female is here for routine f/u. She has a PMHx of breast cancer in remission, HTN, palpitations, and PUD. She notes complaints of palpitations, with heart rates going from 120-130s to 30s at night time. She stopped taking her Metoprolol about 2 months ago, as her heart rates would drop to the 30s at night time and she was afraid she would not wake up. At times when her heart rates are elevated, she notes feeling \"down\" and has sensations of near syncope. She has not had any LOC or fainting spells. She notes palpitation symptoms with mild chest tightness. Palpitation symptoms occur every few weeks. She notes new lower extremity edema that is pitting since stopping her Metoprolol. She has not noticed any improvement in symptoms since stopping Metoprolol. She reports BP at home have been controlled around 130s-140s/90s. She does endorse some shortness of breath, but cannot provide more detail on whether this is with exertion or with rest.    PCP Provider  Howard Becker MD  Past Medical History:   Diagnosis Date    Anemia 10/3/13    \"SINCE I WAS A KID\"    Anxiety     DURING CHEMO; PT STAYS XANAX AS OF 10/3/13    Cancer ([facility] [id number]    breast,chemo ended 10/2012    Chronic pain     si joints sacrum    Depression     Nausea & vomiting     used patch in past with good results    Other ill-defined conditions(799.89)     anemia,chronic diarrhea, TMJ    Ovarian cyst     Palpitations     TAKES METOPROLOL FOR TACHYCARDIA    PUD (peptic ulcer disease)     occurred at age 33 .     Unspecified adverse effect of anesthesia     TMJ      Past Surgical History:   Procedure Laterality Date    BREAST SURGERY PROCEDURE UNLISTED      breast biopsy x3 left breast    HX BREAST RECONSTRUCTION  7/10/2013    BREAST NIPPLE RECONSTRUCTION REVISION performed by Clarence Bravo MD [facility]    HX BREAST RECONSTRUCTION  2012    REINCISION LEFT BREAST FOR REMAINDER OF BREAST CA    HX BREAST RECONSTRUCTION  2012    R SEROMA EXCISED, REPLACE IMPLANT R BREAST    HX  SECTION  1984    HX ENDOSCOPY      [facility] HX GI      anal fissure repair    HX GYN  10/21/13    TLH/BSO    HX HYSTERECTOMY  10/2013    Dr. Nell Fonseca    HX MASTECTOMY  2012    double    HX OTHER SURGICAL      DENTAL SURGERIES WITH SEDATION    HX VASCULAR ACCESS  2012    PORTACATH INSERTION    HX VASCULAR ACCESS  2012    PORTCATH REMOVAL    LAMINOTOMY  2010    [place] COLONOSCOPY FLX DX W/COLLJ SPEC WHEN PFRMD  2011    Dr Angel Romero     Family History   Problem Relation Age of Onset    Breast Cancer Mother 64    Cancer Mother 64         OF LUNG CA    Hypertension Father     Diabetes Father     Emphysema Father     COPD Father     Heart Attack Sister 50    Heart Disease Sister     Breast Cancer Sister     No Known Problems Brother     Anxiety Brother     No Known Problems Sister     Breast Cancer Paternal Grandmother     Malignant Hyperthermia Neg Hx     Pseudocholinesterase Deficiency Neg Hx     Delayed Awakening Neg Hx     Post-op Nausea/Vomiting Neg Hx     Emergence Delirium Neg Hx     Post-op Cognitive Dysfunction Neg Hx     Other Neg Hx      Social History     Socioeconomic History    Marital status: SINGLE     Spouse name: Not on file    Number of children: Not on file    Years of education: Not on file    Highest education level: Not on file   Occupational History    Not on file   Social Needs    Financial resource strain: Not on file    Food insecurity:     Worry: Not on file     Inability: Not on file    Transportation needs:     Medical: Not on file     Non-medical: Not on file   Tobacco Use    Smoking status: Never Smoker    Smokeless tobacco: Never Used   Substance and Sexual Activity    Alcohol use: No     Alcohol/week: 0.0 oz    Drug use: No    Sexual activity: Yes     Partners: Male   Lifestyle    Physical activity:     Days per week: Not on file     Minutes per session: Not on file    Stress: Not on file   Relationships    Social connections:     Talks on phone: Not on file     Gets together: Not on file     Attends Christian service: Not on file     Active member of club or organization: Not on file     Attends meetings of clubs or organizations: Not on file     Relationship status: Not on file    Intimate partner violence:     Fear of current or ex partner: Not on file     Emotionally abused: Not on file     Physically abused: Not on file     Forced sexual activity: Not on file   Other Topics Concern    Not on file   Social History Narrative    Not on file       Allergies   Allergen Reactions    Other Food Other (comments)     PT Gould Codey:  PORK, LETTUCE, LIMA BEANS, BLACK PEPPER, TEA, MALT, WALNUTS.  Beef Containing Products Anaphylaxis    Food Extracts Anaphylaxis     Beef, milk, peanuts    Milk Anaphylaxis    Peanut Anaphylaxis    Alpha-D-Galactosidase Diarrhea    Azithromycin Nausea and Vomiting    Erythromycin Nausea and Vomiting    Guaifenesin Nausea and Vomiting    Morphine Nausea and Vomiting     Projectile vomiting per patient    Other Medication Other (comments)     WHITE CELL STIMULATOR INJECTION (NEULASTIN?) CAUSED SEVERE BONE PAIN LASTING A COUPLE WEEKS. Current Outpatient Medications   Medication Sig    zolpidem CR (AMBIEN CR) 6.25 mg tablet TAKE 1 TABLET BY MOUTH EVERY EVENING AS NEEDED FOR SLEEP    HYDROcodone-acetaminophen (NORCO) 5-325 mg per tablet Take 1 Tab by mouth every eight (8) hours as needed for Pain for up to 99 days. Max Daily Amount: 3 Tabs.  PREVIDENT 5000 BOOSTER PLUS 1.1 % pste USE ONCE A DAY, PREFERABLY AT BEDTIME    ALPRAZolam (XANAX) 0.25 mg tablet Take 1.5 Tabs by mouth daily as needed for Anxiety.     desonide (TRIDESILON) 0.05 % topical lotion Apply  to affected area two (2) times daily as needed for Skin Irritation.  celecoxib (CELEBREX) 200 mg capsule TAKE 1 CAPSULE TWICE A DAY AS NEEDED    pantoprazole (PROTONIX) 40 mg tablet Take 1 Tab by mouth daily.  EPINEPHrine (EPIPEN 2-JULIAN) 0.3 mg/0.3 mL injection 0.3 mL by IntraMUSCular route once as needed for up to 1 dose.  mometasone (NASONEX) 50 mcg/actuation nasal spray 2 Sprays by Both Nostrils route as needed.  albuterol sulfate (PROAIR RESPICLICK) 90 mcg/actuation aepb Take 2 Puffs by inhalation every four (4) hours as needed.  diphenhydrAMINE (BENADRYL) 25 mg capsule Take 50 mg by mouth every six (6) hours as needed. Indications: ALLERGIC REACTIONS    loratadine (CLARITIN) 10 mg tablet Take 10 mg by mouth daily as needed.  cetirizine (ZYRTEC) 10 mg tablet Take 10 mg by mouth nightly. No current facility-administered medications for this visit. I have reviewed the problem list, allergy list, medical history, family, social history and medications. Review of Symptoms:    ROS    Physical Exam:      General: Well developed, in no acute distress, cooperative and alert  HEENT: No carotid bruits, no JVD, trach is midline. Neck Supple, PEERL, EOM intact. Heart:  reg rate and rhythm; normal S1/S2; no murmurs, gallops or rubs. Respiratory: Clear bilaterally x 4, no wheezing or rales  Abdomen:   Soft, non-tender, no distention, no masses. + BS. Extremities:  Normal cap refill, no cyanosis, atraumatic. No edema. Neuro: A&Ox3, speech clear, gait stable. Skin: Skin color is normal. No rashes or lesions.  Non diaphoretic  Vascular: 2+ pulses symmetric in all extremities    Vitals:    05/20/19 0856 05/20/19 0909   BP: 144/82 (!) 164/94   Pulse: 73    Resp: 16    SpO2: 97%    Weight: 198 lb 4.8 oz (89.9 kg)    Height: 5' (1.524 m)        Cardiographics    ECG: sinus rhythm    Results for orders placed or performed during the hospital encounter of 04/08/19   EKG, 12 LEAD, INITIAL   Result Value Ref Range    Ventricular Rate 94 BPM    Atrial Rate 94 BPM    P-R Interval 170 ms    QRS Duration 112 ms    Q-T Interval 374 ms    QTC Calculation (Bezet) 467 ms    Calculated P Axis 46 degrees    Calculated R Axis 11 degrees    Calculated T Axis 48 degrees    Diagnosis       Normal sinus rhythm  Incomplete right bundle branch block  Confirmed by Melissa Burton ([id number]) on 4/9/2019 2:25:52 PM     Results for orders placed or performed in visit on 07/19/11   AMB POC EKG ROUTINE W/ 12 LEADS, INTER & REP    Impression    wnl       Cardiology Labs:  Lab Results   Component Value Date/Time    Cholesterol, total 221 (H) 07/17/2018 03:50 PM    HDL Cholesterol 42 07/17/2018 03:50 PM    LDL, calculated 148 (H) 07/17/2018 03:50 PM    Triglyceride 153 (H) 07/17/2018 03:50 PM    CHOL/HDL Ratio 3.6 12/15/2009 02:10 PM       Lab Results   Component Value Date/Time    Sodium 139 04/22/2019 09:45 AM    Potassium 4.0 04/22/2019 09:45 AM    Chloride 102 04/22/2019 09:45 AM    CO2 21 04/22/2019 09:45 AM    Anion gap 7 04/08/2019 09:06 PM    Glucose 115 (H) 04/22/2019 09:45 AM    BUN 12 04/22/2019 09:45 AM    Creatinine 0.73 04/22/2019 09:45 AM    BUN/Creatinine ratio 16 04/22/2019 09:45 AM    GFR est  04/22/2019 09:45 AM    GFR est non-AA 95 04/22/2019 09:45 AM    Calcium 9.4 04/22/2019 09:45 AM    Bilirubin, total 0.3 04/08/2019 09:06 PM    AST (SGOT) 26 04/08/2019 09:06 PM    Alk. phosphatase 108 04/08/2019 09:06 PM    Protein, total 7.6 04/08/2019 09:06 PM    Albumin 3.8 04/08/2019 09:06 PM    Globulin 3.8 04/08/2019 09:06 PM    A-G Ratio 1.0 (L) 04/08/2019 09:06 PM    ALT (SGPT) 45 04/08/2019 09:06 PM           Assessment:     Assessment:       ICD-10-CM ICD-9-CM    1. Essential hypertension I10 401.9    2. HUYNH (dyspnea on exertion) R06.09 786.09 NUCLEAR CARDIAC STRESS TEST   3.  Palpitations R00.2 785.1 AMB POC EKG ROUTINE W/ 12 LEADS, INTER & REP      ECHO ADULT COMPLETE      LOOP MONITOR Plan: 1. HUYNH (dyspnea on exertion)  Will proceed with workup planned last year  Obtain echocardiogram, and lexiscan stress test.  Patient does not feel comfortable walking on treadmill due to balance issues. Has already been sleep testing done previously negative for sleep apnea. 2. Palpitations  Will obtain 30-day event monitor to assess. TSH normal.    3. Essential hypertension  BP elevated today; off metoprolol for two months  Recommended to check her BP once daily, about 1-2 hours after she wakes up, and after being seated for about 15-20 minutes. Debra Olmstead NP       [facility]    5/20/2019         Patient seen, examined by me personally. Plan discussed as detailed. Agree with note as outlined by  NP. I confirm findings in history and physical exam. No additional findings noted. Agree with plan as outlined above.      Regina Del Cid MD

## 2019-05-20 NOTE — PROGRESS NOTES
1. Have you been to the ER, urgent care clinic since your last visit? Hospitalized since your last visit? Yes When: on 4/8/19 for leg swelling    2. Have you seen or consulted any other health care providers outside of the [address] [place] [address] since your last visit? Include any pap smears or colon screening.  No    Chief Complaint   Patient presents with    Palpitations     onset 2010; pt reports heart rate in the 30s     Leg Swelling     pt c/o bilateral leg swelling, worse on the left     Shortness of Breath

## 2019-05-30 ENCOUNTER — TELEPHONE (OUTPATIENT)
Dept: FAMILY MEDICINE CLINIC | Age: 54
End: 2019-05-30

## 2019-05-30 NOTE — TELEPHONE ENCOUNTER
Patient wants a return call regarding the medication for celecoxib (CELEBREX) 200 mg capsule, she said she needs to have a prior auth .   Please give her a call @ [phone]

## 2019-05-31 ENCOUNTER — DOCUMENTATION ONLY (OUTPATIENT)
Dept: FAMILY MEDICINE CLINIC | Age: 54
End: 2019-05-31

## 2019-06-03 ENCOUNTER — OFFICE VISIT (OUTPATIENT)
Dept: FAMILY MEDICINE CLINIC | Age: 54
End: 2019-06-03

## 2019-06-03 VITALS
TEMPERATURE: 97.7 F | HEIGHT: 60 IN | HEART RATE: 93 BPM | BODY MASS INDEX: 38.56 KG/M2 | RESPIRATION RATE: 16 BRPM | DIASTOLIC BLOOD PRESSURE: 86 MMHG | WEIGHT: 196.4 LBS | OXYGEN SATURATION: 96 % | SYSTOLIC BLOOD PRESSURE: 144 MMHG

## 2019-06-03 DIAGNOSIS — Z91.09 ENVIRONMENTAL ALLERGIES: ICD-10-CM

## 2019-06-03 DIAGNOSIS — J98.01 BRONCHOSPASM: ICD-10-CM

## 2019-06-03 DIAGNOSIS — R03.0 ELEVATED BLOOD PRESSURE READING IN OFFICE WITHOUT DIAGNOSIS OF HYPERTENSION: Primary | ICD-10-CM

## 2019-06-03 DIAGNOSIS — R00.2 PALPITATIONS: ICD-10-CM

## 2019-06-03 DIAGNOSIS — E87.6 HYPOKALEMIA: ICD-10-CM

## 2019-06-03 DIAGNOSIS — R53.82 CHRONIC FATIGUE: ICD-10-CM

## 2019-06-03 RX ORDER — EPINEPHRINE 0.3 MG/.3ML
0.3 INJECTION SUBCUTANEOUS
Qty: 0.6 ML | Refills: 1 | Status: SHIPPED | OUTPATIENT
Start: 2019-06-03 | End: 2019-06-03

## 2019-06-03 RX ORDER — MINERAL OIL
180 ENEMA (ML) RECTAL
COMMUNITY
End: 2019-06-28

## 2019-06-03 NOTE — PROGRESS NOTES
Chief Complaint   Patient presents with    Hypertension    Fatigue     1. Have you been to the ER, urgent care clinic since your last visit? Hospitalized since your last visit? NO    2. Have you seen or consulted any other health care providers outside of the [address] [place] [address] since your last visit? Include any pap smears or colon screening. NO    Concerns: extremely tired and muscle weakness, taste buds and smell decreased.     Mammogram: Juan Mast  Bone density: 6/24/13  Colonoscopy: 9/14/11 - 10 years

## 2019-06-03 NOTE — PROGRESS NOTES
HISTORY OF PRESENT ILLNESS  Estefania Faustin is a 47 y.o. female. Follow up on her elevated blood pressure and palpitations. She is seeing cardiology and has seveal test scheduled. Told not to start back on the beta blocker unitl after tests were completed. She is still concerned about the weight gain and weight in her abd. She noted that her TSH has been increasing over the past 5 years that is has been checked but still in the normal range. She has scheduled to follow up with endo. She also has had increase fatigue and facial hair. No recent use of steroids. Her cortisol levels were noted to be normal.        Patient Active Problem List   Diagnosis Code    Palpitations R00.2    Depression F32.9    Environmental allergies Z91.09    Breast cancer, stage 1 ([facility] [address] C50.919    GERD (gastroesophageal reflux disease) K21.9    Insomnia G47.00    Essential hypertension I10    ADD (attention deficit disorder) F98.8    Encounter for medication monitoring Z51.81    Severe obesity (BMI 35.0-39. 9) with comorbidity ([facility]) E66.01       Current Outpatient Medications   Medication Sig Dispense Refill    albuterol sulfate (PROAIR RESPICLICK) 90 mcg/actuation aepb Take 2 Puffs by inhalation every four (4) hours as needed (wheezing). 1 Inhaler 0    EPINEPHrine (EPIPEN 2-JULIAN) 0.3 mg/0.3 mL injection 0.3 mL by IntraMUSCular route once as needed for Allergic Response for up to 1 dose. 0.6 mL 1    zolpidem CR (AMBIEN CR) 6.25 mg tablet TAKE 1 TABLET BY MOUTH EVERY EVENING AS NEEDED FOR SLEEP 90 Tab 1    HYDROcodone-acetaminophen (NORCO) 5-325 mg per tablet Take 1 Tab by mouth every eight (8) hours as needed for Pain for up to 99 days. Max Daily Amount: 3 Tabs. 21 Tab 0    PREVIDENT 5000 BOOSTER PLUS 1.1 % pste USE ONCE A DAY, PREFERABLY AT BEDTIME  4    ALPRAZolam (XANAX) 0.25 mg tablet Take 1.5 Tabs by mouth daily as needed for Anxiety.  45 Tab 1    desonide (TRIDESILON) 0.05 % topical lotion Apply  to affected area two (2) times daily as needed for Skin Irritation. 59 mL 0    celecoxib (CELEBREX) 200 mg capsule TAKE 1 CAPSULE TWICE A DAY AS NEEDED 180 Cap 3    pantoprazole (PROTONIX) 40 mg tablet Take 1 Tab by mouth daily. 30 Tab 11    mometasone (NASONEX) 50 mcg/actuation nasal spray 2 Sprays by Both Nostrils route as needed. 1 Container 11    diphenhydrAMINE (BENADRYL) 25 mg capsule Take 50 mg by mouth every six (6) hours as needed. Indications: ALLERGIC REACTIONS      loratadine (CLARITIN) 10 mg tablet Take 10 mg by mouth daily as needed.  cetirizine (ZYRTEC) 10 mg tablet Take 10 mg by mouth nightly. Allergies   Allergen Reactions    Other Food Other (comments)     PT STATES OTHER FOOD ALLERGIES WITH VARYING REACTIONS:  PORK, LETTUCE, LIMA BEANS, BLACK PEPPER, TEA, MALT, WALNUTS.  Beef Containing Products Anaphylaxis    Food Extracts Anaphylaxis     Beef, milk, peanuts    Milk Anaphylaxis    Peanut Anaphylaxis    Alpha-D-Galactosidase Diarrhea    Azithromycin Nausea and Vomiting    Erythromycin Nausea and Vomiting    Guaifenesin Nausea and Vomiting    Morphine Nausea and Vomiting     Projectile vomiting per patient    Other Medication Other (comments)     WHITE CELL STIMULATOR INJECTION (NEULASTIN?) CAUSED SEVERE BONE PAIN LASTING A COUPLE WEEKS. Past Medical History:   Diagnosis Date    Anemia 10/3/13    \"SINCE I WAS A KID\"    Anxiety     DURING CHEMO; PT STAYS XANAX AS OF 10/3/13    Cancer ([facility] [id number]    breast,chemo ended 10/2012    Chronic pain     si joints sacrum    Depression     Nausea & vomiting     used patch in past with good results    Other ill-defined conditions(799.89)     anemia,chronic diarrhea, TMJ    Ovarian cyst     Palpitations     TAKES METOPROLOL FOR TACHYCARDIA    PUD (peptic ulcer disease)     occurred at age 33 .     Unspecified adverse effect of anesthesia     TMJ         Past Surgical History:   Procedure Laterality Date    BREAST SURGERY PROCEDURE UNLISTED      breast biopsy x3 left breast    HX BREAST RECONSTRUCTION  7/10/2013    BREAST NIPPLE RECONSTRUCTION REVISION performed by Malka Hurtado MD at [address] HX BREAST RECONSTRUCTION  2012    REINCISION LEFT BREAST FOR REMAINDER OF BREAST CA    HX BREAST RECONSTRUCTION  2012    R SEROMA EXCISED, REPLACE IMPLANT R BREAST    HX  SECTION  1984    HX ENDOSCOPY      [facility] HX GI      anal fissure repair    HX GYN  10/21/13    TLH/BSO    HX HYSTERECTOMY  10/2013    Dr. Rohit Brenner    HX MASTECTOMY  2012    double    HX OTHER SURGICAL      DENTAL SURGERIES WITH SEDATION    HX VASCULAR ACCESS  2012    PORTACATH INSERTION    HX VASCULAR ACCESS  2012    PORTCATH REMOVAL    LAMINOTOMY  2010    GA COLONOSCOPY FLX DX W/COLLJ SPEC WHEN PFRMD  2011    Dr Azeem Morales         Family History   Problem Relation Age of Onset    Breast Cancer Mother 64    Cancer Mother 64         OF LUNG CA    Hypertension Father     Diabetes Father     Emphysema Father     COPD Father     Heart Attack Sister 50    Heart Disease Sister     Breast Cancer Sister     No Known Problems Brother     Anxiety Brother     No Known Problems Sister     Breast Cancer Paternal Grandmother     Malignant Hyperthermia Neg Hx     Pseudocholinesterase Deficiency Neg Hx     Delayed Awakening Neg Hx     Post-op Nausea/Vomiting Neg Hx     Emergence Delirium Neg Hx     Post-op Cognitive Dysfunction Neg Hx     Other Neg Hx        Social History     Tobacco Use    Smoking status: Never Smoker    Smokeless tobacco: Never Used   Substance Use Topics    Alcohol use: No     Alcohol/week: 0.0 oz        Lab Results   Component Value Date/Time    WBC 7.7 2019 09:06 PM    HGB 12.2 2019 09:06 PM    HCT 38.0 2019 09:06 PM    PLATELET 549  09:06 PM    MCV 80.0 2019 09:06 PM     Lab Results   Component Value Date/Time Cholesterol, total 221 (H) 07/17/2018 03:50 PM    HDL Cholesterol 42 07/17/2018 03:50 PM    LDL, calculated 148 (H) 07/17/2018 03:50 PM    LDL-C, External 139 05/21/2015 01:33 PM    Triglyceride 153 (H) 07/17/2018 03:50 PM    CHOL/HDL Ratio 3.6 12/15/2009 02:10 PM     Lab Results   Component Value Date/Time    Glucose 115 (H) 04/22/2019 09:45 AM      Lab Results   Component Value Date/Time    Sodium 139 04/22/2019 09:45 AM    Potassium 4.0 04/22/2019 09:45 AM    Chloride 102 04/22/2019 09:45 AM    CO2 21 04/22/2019 09:45 AM    Anion gap 7 04/08/2019 09:06 PM    Glucose 115 (H) 04/22/2019 09:45 AM    BUN 12 04/22/2019 09:45 AM    Creatinine 0.73 04/22/2019 09:45 AM    BUN/Creatinine ratio 16 04/22/2019 09:45 AM    GFR est  04/22/2019 09:45 AM    GFR est non-AA 95 04/22/2019 09:45 AM    Calcium 9.4 04/22/2019 09:45 AM    Bilirubin, total 0.3 04/08/2019 09:06 PM    ALT (SGPT) 45 04/08/2019 09:06 PM    AST (SGOT) 26 04/08/2019 09:06 PM    Alk. phosphatase 108 04/08/2019 09:06 PM    Protein, total 7.6 04/08/2019 09:06 PM    Albumin 3.8 04/08/2019 09:06 PM    Globulin 3.8 04/08/2019 09:06 PM    A-G Ratio 1.0 (L) 04/08/2019 09:06 PM      Lab Results   Component Value Date/Time    Hemoglobin A1c (POC) 6.0 04/22/2019 09:50 AM    Hemoglobin A1c, External 5.7 05/21/2015 01:33 PM     d/t    Review of Systems   Constitutional: Positive for fatigue. Negative for malaise/fatigue. HENT: Negative for congestion. Eyes: Negative for blurred vision. Respiratory: Negative for cough and shortness of breath. Cardiovascular: Negative for chest pain, palpitations and leg swelling. Gastrointestinal: Negative for abdominal pain, constipation and heartburn. Genitourinary: Negative for dysuria, frequency and urgency. Musculoskeletal: Negative for back pain and joint pain. Neurological: Negative for dizziness, tingling and headaches. Endo/Heme/Allergies: Positive for environmental allergies.    Psychiatric/Behavioral: Negative for depression. The patient does not have insomnia. Physical Exam   Constitutional: She appears well-developed and well-nourished. /86   Pulse 93   Temp 97.7 °F (36.5 °C) (Oral)   Resp 16   Ht 5' (1.524 m)   Wt 196 lb 6.4 oz (89.1 kg)   LMP 08/03/2012   SpO2 96%   BMI 38.36 kg/m²    HENT:   Right Ear: Tympanic membrane and ear canal normal.   Left Ear: Tympanic membrane and ear canal normal.   Nose: No mucosal edema or rhinorrhea. Mouth/Throat: Oropharynx is clear and moist and mucous membranes are normal.   Neck: Normal range of motion. Neck supple. No thyromegaly present. Cardiovascular: Normal rate, regular rhythm and normal heart sounds. Exam reveals no gallop. Pulmonary/Chest: Effort normal and breath sounds normal.   Abdominal: Soft. Normal appearance and bowel sounds are normal. She exhibits no mass. There is no tenderness. Musculoskeletal: Normal range of motion. She exhibits no edema. Lymphadenopathy:     She has no cervical adenopathy. Skin: Skin is warm and dry. Psychiatric: She has a normal mood and affect. Nursing note and vitals reviewed. ASSESSMENT and PLAN  Diagnoses and all orders for this visit:    1. Elevated blood pressure reading in office without diagnosis of hypertension  As per cardiology    2. Palpitations  As per cardiology    3. Chronic fatigue  ?related to her chronic use of Zyrtec. We discussed this and decided that she would try to switch her allergy medicaion to allegra. Randall Meraz has a 22% incidence of fatigue and drowsiness. 4. Hypokalemia  -     POTASSIUM    5. Bronchospasm//  6. Environmental allergies  -     Refill albuterol sulfate (PROAIR RESPICLICK) 90 mcg/actuation aepb; Take 2 Puffs by inhalation every four (4) hours as needed (wheezing). Other orders  -     EPINEPHrine (EPIPEN 2-JULIAN) 0.3 mg/0.3 mL injection; 0.3 mL by IntraMUSCular route once as needed for Allergic Response for up to 1 dose.           reviewed diet, exercise and weight control  cardiovascular risk and specific lipid/LDL goals reviewed  reviewed medications and side effects in detail    I have discussed diagnosis listed in this note with pt and/or family. I have discussed treatment plans and options and the risk/benefit analysis of those options, including safe use of medications and possible medication side effects. Through the use of shared decision making we have agreed to the above plan. The patient has received an after-visit summary and questions were answered concerning future plans and follow up. Advise pt of any urgent changes then to proceed to the ER.

## 2019-06-04 LAB — POTASSIUM SERPL-SCNC: 3.9 MMOL/L (ref 3.5–5.2)

## 2019-06-05 ENCOUNTER — TELEPHONE (OUTPATIENT)
Dept: FAMILY MEDICINE CLINIC | Age: 54
End: 2019-06-05

## 2019-06-05 NOTE — TELEPHONE ENCOUNTER
----- Message from Christopher Velez MD sent at 6/4/2019  6:11 PM EDT -----  Please call pt and let her know that her k is normal.

## 2019-06-13 ENCOUNTER — CLINICAL SUPPORT (OUTPATIENT)
Dept: CARDIOLOGY CLINIC | Age: 54
End: 2019-06-13

## 2019-06-13 DIAGNOSIS — R00.2 PALPITATIONS: ICD-10-CM

## 2019-06-13 NOTE — PROGRESS NOTES
Patient received a 30 day event monitor. Instructions given verbally as well as an instruction sheet. Pt verbalized understanding.     Biotel monitoring

## 2019-06-28 ENCOUNTER — OFFICE VISIT (OUTPATIENT)
Dept: ENDOCRINOLOGY | Age: 54
End: 2019-06-28

## 2019-06-28 VITALS
WEIGHT: 198.3 LBS | HEART RATE: 74 BPM | DIASTOLIC BLOOD PRESSURE: 83 MMHG | RESPIRATION RATE: 16 BRPM | HEIGHT: 60 IN | SYSTOLIC BLOOD PRESSURE: 133 MMHG | OXYGEN SATURATION: 95 % | BODY MASS INDEX: 38.93 KG/M2

## 2019-06-28 DIAGNOSIS — R53.82 CHRONIC FATIGUE: Primary | ICD-10-CM

## 2019-06-28 RX ORDER — EPINEPHRINE 0.3 MG/.3ML
INJECTION SUBCUTANEOUS
Refills: 1 | COMMUNITY
Start: 2019-06-03 | End: 2021-02-01 | Stop reason: SDUPTHER

## 2019-06-28 RX ORDER — METOPROLOL SUCCINATE 25 MG/1
12.5 TABLET, EXTENDED RELEASE ORAL DAILY
COMMUNITY
End: 2020-04-10 | Stop reason: SDUPTHER

## 2019-06-28 NOTE — PATIENT INSTRUCTIONS
Labs today,     Will call with results and plan,     Amado Wang.  [facility] Endocrinology  [address] [place] [address]

## 2019-06-28 NOTE — PROGRESS NOTES
CONSULTATION REQUESTED BY: Angus Palmer MD     REASON FOR CONSULT: thyroid evaluation    CHIEF COMPLAINT: thyroid evaluation    HISTORY OF PRESENT ILLNESS:   Jose Salazar is a 49 y.o. female with a PMHx as noted below who was referred to our endocrinology clinic for evaluation of thyroid function. Patient reports that she noticed that her thyroid levels, though in the normal range, have been \"jumping\" and she is concerned that she is developing thyroid dysfunction. She notes that she is feeling chronic fatigue and hair loss. Patient denies family history of thyroid dysfunction. She denies any personal history of thyroid dysfunction. Patient has a history of breast cancer affecting the left breast, but she has a double mastectomy. She had chemo but no radiation. Gained only 3 pounds over the past year but she notes her weight has increased compared with 2012. There are no thyroid autoantibodies available for review. Patient is having palpitations intermittently and is currently wearing a heart monitor for a month. She was on metoprolol in the past but is no longer on it. Patient has used hydrocodone on and off for several years which can result in lower than expected TSH levels. Last used on Monday, used twice the week before, using intermittently since 2010 for back pain (SI joint + L5 pain). Patient would like evaluation of her thyroid function. Patient is not diabetic. Had a hysterectomy (ovaries removed) Aug 2014.      Review of most recent thyroid function:  Lab Results   Component Value Date    TSH 3.610 04/22/2019    TSH 2.330 01/03/2018    TSH 1.210 01/17/2014    FT4 1.11 04/22/2019    FT4 1.05 03/19/2011      Thyroid Lab Key:  TSILT = Thyroid stimulating antibodies  TRALT = TSH Receptor Antibodies  TMCLT = TPO antibodies  T3LT = Total T3 levels  736692 = Direct FT4  913116 = Free T3      PAST MEDICAL/SURGICAL HISTORY:   Past Medical History:   Diagnosis Date    Anemia 10/3/13 \"SINCE I WAS A KID\"    Anxiety     DURING CHEMO; PT STAYS XANAX AS OF 10/3/13    Cancer ([facility] [address] [id number]    breast,chemo ended 10/2012    Chronic pain     si joints sacrum    Depression     Nausea & vomiting     used patch in past with good results    Other ill-defined conditions(799.89)     anemia,chronic diarrhea, TMJ    Ovarian cyst     Palpitations     TAKES METOPROLOL FOR TACHYCARDIA    PUD (peptic ulcer disease)     occurred at age 33 .  Unspecified adverse effect of anesthesia     TMJ     Past Surgical History:   Procedure Laterality Date    BREAST SURGERY PROCEDURE UNLISTED      breast biopsy x3 left breast    HX BREAST RECONSTRUCTION  7/10/2013    BREAST NIPPLE RECONSTRUCTION REVISION performed by Selina Castro MD at [address] HX BREAST RECONSTRUCTION  2012    REINCISION LEFT BREAST FOR REMAINDER OF BREAST CA    HX BREAST RECONSTRUCTION  2012    R SEROMA EXCISED, REPLACE IMPLANT R BREAST    HX  SECTION      HX ENDOSCOPY      [facility] HX GI      anal fissure repair    HX GYN  10/21/13    TLH/BSO    HX HYSTERECTOMY  10/2013    Dr. Yisel David    HX MASTECTOMY  2012    double    HX OTHER SURGICAL      DENTAL SURGERIES WITH SEDATION    HX VASCULAR ACCESS  2012    PORTACATH INSERTION    HX VASCULAR ACCESS  2012    PORTCATH REMOVAL    LAMINOTOMY  2010    NE COLONOSCOPY FLX DX W/COLLJ SPEC WHEN PFRMD  2011    Dr Tae Siddiqui       ALLERGIES:   Allergies   Allergen Reactions    Other Food Other (comments)     PT STATES OTHER FOOD ALLERGIES WITH VARYING REACTIONS:  PORK, LETTUCE, LIMA BEANS, BLACK PEPPER, TEA, MALT, WALNUTS.     Beef Containing Products Anaphylaxis    Food Extracts Anaphylaxis     Beef, milk, peanuts    Milk Anaphylaxis    Peanut Anaphylaxis    Alpha-D-Galactosidase Diarrhea    Azithromycin Nausea and Vomiting    Erythromycin Nausea and Vomiting    Guaifenesin Nausea and Vomiting    Morphine Nausea and Vomiting     Projectile vomiting per patient    Other Medication Other (comments)     WHITE CELL STIMULATOR INJECTION (NEULASTIN?) CAUSED SEVERE BONE PAIN LASTING A COUPLE WEEKS. MEDICATIONS ON ADMISSION:     Current Outpatient Medications:     metoprolol succinate (TOPROL-XL) 25 mg XL tablet, Take  by mouth daily. , Disp: , Rfl:     albuterol sulfate (PROAIR RESPICLICK) 90 mcg/actuation aepb, Take 2 Puffs by inhalation every four (4) hours as needed (wheezing). , Disp: 1 Inhaler, Rfl: 0    zolpidem CR (AMBIEN CR) 6.25 mg tablet, TAKE 1 TABLET BY MOUTH EVERY EVENING AS NEEDED FOR SLEEP, Disp: 90 Tab, Rfl: 1    HYDROcodone-acetaminophen (NORCO) 5-325 mg per tablet, Take 1 Tab by mouth every eight (8) hours as needed for Pain for up to 99 days. Max Daily Amount: 3 Tabs., Disp: 21 Tab, Rfl: 0    PREVIDENT 5000 BOOSTER PLUS 1.1 % pste, USE ONCE A DAY, PREFERABLY AT BEDTIME, Disp: , Rfl: 4    ALPRAZolam (XANAX) 0.25 mg tablet, Take 1.5 Tabs by mouth daily as needed for Anxiety. , Disp: 45 Tab, Rfl: 1    desonide (TRIDESILON) 0.05 % topical lotion, Apply  to affected area two (2) times daily as needed for Skin Irritation. , Disp: 59 mL, Rfl: 0    celecoxib (CELEBREX) 200 mg capsule, TAKE 1 CAPSULE TWICE A DAY AS NEEDED, Disp: 180 Cap, Rfl: 3    pantoprazole (PROTONIX) 40 mg tablet, Take 1 Tab by mouth daily. , Disp: 30 Tab, Rfl: 11    mometasone (NASONEX) 50 mcg/actuation nasal spray, 2 Sprays by Both Nostrils route as needed. , Disp: 1 Container, Rfl: 11    diphenhydrAMINE (BENADRYL) 25 mg capsule, Take 50 mg by mouth every six (6) hours as needed. Indications: ALLERGIC REACTIONS, Disp: , Rfl:     cetirizine (ZYRTEC) 10 mg tablet, Take 10 mg by mouth nightly., Disp: , Rfl:     EPINEPHrine (EPIPEN) 0.3 mg/0.3 mL injection, USE AS DIRECTED FOR ALLERGIC REACTION, Disp: , Rfl: 1    fexofenadine (ALLEGRA) 180 mg tablet, Take 1 Tab by mouth daily as needed for Allergies. , Disp: , Rfl:     loratadine (CLARITIN) 10 mg tablet, Take 10 mg by mouth daily as needed. , Disp: , Rfl:     SOCIAL HISTORY:   Social History     Socioeconomic History    Marital status: SINGLE     Spouse name: Not on file    Number of children: Not on file    Years of education: Not on file    Highest education level: Not on file   Occupational History    Not on file   Social Needs    Financial resource strain: Not on file    Food insecurity:     Worry: Not on file     Inability: Not on file    Transportation needs:     Medical: Not on file     Non-medical: Not on file   Tobacco Use    Smoking status: Never Smoker    Smokeless tobacco: Never Used   Substance and Sexual Activity    Alcohol use: No     Alcohol/week: 0.0 oz    Drug use: No    Sexual activity: Yes     Partners: Male   Lifestyle    Physical activity:     Days per week: Not on file     Minutes per session: Not on file    Stress: Not on file   Relationships    Social connections:     Talks on phone: Not on file     Gets together: Not on file     Attends L[employer]-day service: Not on file     Active member of club or organization: Not on file     Attends meetings of clubs or organizations: Not on file     Relationship status: Not on file    Intimate partner violence:     Fear of current or ex partner: Not on file     Emotionally abused: Not on file     Physically abused: Not on file     Forced sexual activity: Not on file   Other Topics Concern    Not on file   Social History Narrative    Not on file       FAMILY HISTORY:  Family History   Problem Relation Age of Onset    Breast Cancer Mother 64    Cancer Mother 64         OF LUNG CA    Hypertension Father     Diabetes Father     Emphysema Father     COPD Father     Heart Attack Sister 50    Heart Disease Sister     Breast Cancer Sister     No Known Problems Brother     Anxiety Brother     No Known Problems Sister     Breast Cancer Paternal Grandmother     Malignant Hyperthermia Neg Hx     Pseudocholinesterase Deficiency Neg Hx     Delayed Awakening Neg Hx     Post-op Nausea/Vomiting Neg Hx     Emergence Delirium Neg Hx     Post-op Cognitive Dysfunction Neg Hx     Other Neg Hx        REVIEW OF SYSTEMS: Complete ROS assessed and noted for that which is described above, all else are negative. Eyes: normal  ENT: normal  CVS: normal  Resp: normal  GI: normal  : normal  GYN: normal  Endocrine: normal  Integument: normal  Musculoskeletal: normal  Neuro: normal  Psych: normal      PHYSICAL EXAMINATION:    VITAL SIGNS:  Visit Vitals  /83   Pulse 74   Resp 16   Ht 5' (1.524 m)   Wt 198 lb 4.8 oz (89.9 kg)   LMP 08/03/2012   SpO2 95%   BMI 38.73 kg/m²       GENERAL: NCAT, Sitting comfortably, NAD  EYES: EOMI, non-icteric, no proptosis  Ear/Nose/Throat: NCAT, no inflammation, no masses  LYMPH NODES: No LAD  CARDIOVASCULAR: S1 S2, RRR, No murmur, 2+ radial pulses  RESPIRATORY: CTA b/l, no wheeze/rales  GASTROINTESTINAL: ND  MUSCULOSKELETAL: Normal ROM, no atrophy  SKIN: warm, no edema/rash/ or other skin changes  NEUROLOGIC: 5/5 power all extremities, no tremors, AAOx3  PSYCHIATRIC: Normal affect, Normal insight and judgement      REVIEW OF LABORATORY AND RADIOLOGY DATA:   Labs and documentation have been reviewed as described above. ASSESSMENT AND PLAN:   Denise Willett is a 49 y.o. female with a PMHx as noted above who was referred to our endocrinology clinic for thyroid evaluation. Chronic fatigue/weight gain  Thyroid evaluation    Patient with TSH trending up over time (see above trend) and with symptoms that are suggestive to her of hypothyroidism. Interestingly with chronic opiate therapy (though intermittent) her TSH may suppress to lower than where it might actually have been depending on her thyroid hormone level. I advised her that we will check for thyroid autoantibodies, and if positive, I would be willing to a trial of thyroid hormone replacement.  If her thyroid levels are normal however and her thyroid autoantibodies are negative, we would not have any indication for a trial of thyroid hormone replacement. Labs today,   Will discuss results and follow up plan depending on results,     Rohan Bautista.  [facility] Endocrinology  [address] [place] [address]

## 2019-06-29 LAB
T4 FREE SERPL-MCNC: 1 NG/DL (ref 0.82–1.77)
THYROGLOB AB SERPL-ACNC: <1 IU/ML (ref 0–0.9)
THYROPEROXIDASE AB SERPL-ACNC: 8 IU/ML (ref 0–34)
TSH SERPL DL<=0.005 MIU/L-ACNC: 2.61 UIU/ML (ref 0.45–4.5)

## 2019-07-02 ENCOUNTER — PATIENT MESSAGE (OUTPATIENT)
Dept: ENDOCRINOLOGY | Age: 54
End: 2019-07-02

## 2019-07-02 NOTE — PROGRESS NOTES
TSH 2.6, FT4 is 1.0,   Thyroid autoantibodies are both negative,   No indication for thyroid hormone replacement at the present time,   Will send patient a [employer],    [facility].  [address] [place] [address] Endocrinology  [address] [place] [address]

## 2019-07-27 DIAGNOSIS — K21.9 GASTROESOPHAGEAL REFLUX DISEASE WITHOUT ESOPHAGITIS: ICD-10-CM

## 2019-07-27 RX ORDER — PANTOPRAZOLE SODIUM 40 MG/1
TABLET, DELAYED RELEASE ORAL
Qty: 90 TAB | Refills: 3 | Status: SHIPPED | OUTPATIENT
Start: 2019-07-27 | End: 2020-09-18 | Stop reason: SDUPTHER

## 2019-09-08 DIAGNOSIS — M54.50 CHRONIC MIDLINE LOW BACK PAIN WITHOUT SCIATICA: ICD-10-CM

## 2019-09-08 DIAGNOSIS — G89.29 CHRONIC MIDLINE LOW BACK PAIN WITHOUT SCIATICA: ICD-10-CM

## 2019-09-08 RX ORDER — CELECOXIB 200 MG/1
CAPSULE ORAL
Qty: 180 CAP | Refills: 3 | Status: SHIPPED | OUTPATIENT
Start: 2019-09-08 | End: 2019-11-22 | Stop reason: SDUPTHER

## 2019-09-09 ENCOUNTER — OFFICE VISIT (OUTPATIENT)
Dept: FAMILY MEDICINE CLINIC | Age: 54
End: 2019-09-09

## 2019-09-09 VITALS
HEIGHT: 60 IN | DIASTOLIC BLOOD PRESSURE: 87 MMHG | RESPIRATION RATE: 16 BRPM | OXYGEN SATURATION: 96 % | WEIGHT: 197 LBS | SYSTOLIC BLOOD PRESSURE: 137 MMHG | HEART RATE: 81 BPM | BODY MASS INDEX: 38.68 KG/M2 | TEMPERATURE: 98.1 F

## 2019-09-09 DIAGNOSIS — Z51.81 ENCOUNTER FOR MEDICATION MONITORING: ICD-10-CM

## 2019-09-09 DIAGNOSIS — N30.00 ACUTE CYSTITIS WITHOUT HEMATURIA: ICD-10-CM

## 2019-09-09 DIAGNOSIS — K21.9 GASTROESOPHAGEAL REFLUX DISEASE WITHOUT ESOPHAGITIS: ICD-10-CM

## 2019-09-09 DIAGNOSIS — G89.29 ENCOUNTER FOR CHRONIC PAIN MANAGEMENT: ICD-10-CM

## 2019-09-09 DIAGNOSIS — G89.29 CHRONIC MIDLINE LOW BACK PAIN WITHOUT SCIATICA: ICD-10-CM

## 2019-09-09 DIAGNOSIS — M54.6 CHRONIC RIGHT-SIDED THORACIC BACK PAIN: ICD-10-CM

## 2019-09-09 DIAGNOSIS — I10 ESSENTIAL HYPERTENSION: Primary | ICD-10-CM

## 2019-09-09 DIAGNOSIS — M54.50 CHRONIC MIDLINE LOW BACK PAIN WITHOUT SCIATICA: ICD-10-CM

## 2019-09-09 DIAGNOSIS — G89.29 CHRONIC RIGHT-SIDED THORACIC BACK PAIN: ICD-10-CM

## 2019-09-09 DIAGNOSIS — M47.816 LUMBAR ARTHROPATHY: ICD-10-CM

## 2019-09-09 LAB
BILIRUB UR QL STRIP: NEGATIVE
GLUCOSE UR-MCNC: NEGATIVE MG/DL
KETONES P FAST UR STRIP-MCNC: NEGATIVE MG/DL
PH UR STRIP: 7.5 [PH] (ref 4.6–8)
PROT UR QL STRIP: NEGATIVE
SP GR UR STRIP: 1.01 (ref 1–1.03)
UA UROBILINOGEN AMB POC: NORMAL (ref 0.2–1)
URINALYSIS CLARITY POC: NORMAL
URINALYSIS COLOR POC: YELLOW
URINE BLOOD POC: NEGATIVE
URINE LEUKOCYTES POC: NEGATIVE
URINE NITRITES POC: NEGATIVE

## 2019-09-09 RX ORDER — NITROFURANTOIN (MACROCRYSTALS) 100 MG/1
100 CAPSULE ORAL 2 TIMES DAILY
COMMUNITY
End: 2019-09-09 | Stop reason: ALTCHOICE

## 2019-09-09 RX ORDER — METOPROLOL SUCCINATE 25 MG/1
25 TABLET, EXTENDED RELEASE ORAL EVERY EVENING
Qty: 90 TAB | Refills: 3 | Status: SHIPPED | OUTPATIENT
Start: 2019-09-09 | End: 2020-01-03 | Stop reason: DRUGHIGH

## 2019-09-09 RX ORDER — METOPROLOL SUCCINATE 25 MG/1
12.5 TABLET, EXTENDED RELEASE ORAL EVERY EVENING
Qty: 45 TAB | Refills: 3 | Status: SHIPPED | OUTPATIENT
Start: 2019-09-09 | End: 2019-09-09 | Stop reason: SDUPTHER

## 2019-09-09 RX ORDER — HYDROCODONE BITARTRATE AND ACETAMINOPHEN 5; 325 MG/1; MG/1
1 TABLET ORAL
COMMUNITY
End: 2019-09-09 | Stop reason: SDUPTHER

## 2019-09-09 RX ORDER — HYDROCODONE BITARTRATE AND ACETAMINOPHEN 5; 325 MG/1; MG/1
1 TABLET ORAL
Qty: 21 TAB | Refills: 0 | Status: SHIPPED | OUTPATIENT
Start: 2019-09-09 | End: 2019-09-09 | Stop reason: SDUPTHER

## 2019-09-09 RX ORDER — HYDROCODONE BITARTRATE AND ACETAMINOPHEN 5; 325 MG/1; MG/1
1 TABLET ORAL
Qty: 21 TAB | Refills: 0 | Status: SHIPPED | OUTPATIENT
Start: 2019-09-09 | End: 2019-10-22 | Stop reason: SDUPTHER

## 2019-09-09 RX ORDER — METOPROLOL SUCCINATE 25 MG/1
12.5 TABLET, EXTENDED RELEASE ORAL EVERY EVENING
COMMUNITY
End: 2019-09-09 | Stop reason: SDUPTHER

## 2019-09-09 NOTE — PROGRESS NOTES
HISTORY OF PRESENT ILLNESS  Claudeen Manchester is a 49 y.o. female. Follow up on chronic medical problems. Cardiovascular Review:  The patient has hypertension. Diet and Lifestyle: generally follows a low fat low cholesterol diet, generally follows a low sodium diet, exercises sporadically  Home BP Monitoring: is not measured at home. Pertinent ROS: taking medications as instructed, no medication side effects noted, no TIA's, no chest pain on exertion, no dyspnea on exertion, no swelling of ankles. Anxiety Review:  Patient is seen for ADD, sleep disturbance and anxiety. Current treatment includes Xanax. Ongoing symptoms include: insomnia. She is using Kaila Apley for sleep. Reported side effects from the treatment: none. Encounter for pain management. 1./2. Medical history/Past medical History  Chronic Pain:  Patient has osteoarthritis in the lower back and suffer with chronic pain in her lower back. She has been having increased pain at times in the mid right side of the back. Has been taking celebrex twice a day as long as she does not have GI upset. She uses the hydrocodone for more severe pain. Pain ranges from 6-9/10. MRI lumbar spine in 2009 showed DDD then with bulging disc and central radial teat in L4-5. She had laminectomy in 2010 for one disc. She has had multiple steroid epidural injections. She is still having some upper back pain that comes and goes. She did not get xray done from last visit. Symptoms onset: problem is longstanding. Rheumatological ROS: stable, mild-to-moderate joint symptoms intermittently, reasonably well controlled by PRN meds. Response to treatment plan: stable. 3. Applicable records from prior treatment providers are apart of [facility] under the media tab. 4. Diagnostic, therapeutic and laboratory results are available in the [facility] chart. 5. Consultation notes are available for review in the media tab of the [facility] chart.   6. Treatment goals include pain control so that the pt may be as active and function with her daily activities and improved comfort level. Previously pt has been limited by pain. 7. The risks and benefits of treatment has been discussed at this office visit with the pt. She understands that the medication has addicting potential.  Additionally the pt has been advised that narcotic pain medication may impair mental and/or physical ability required for performance of tasks such as driving or operating any other machinery. 8. Pt has an updated signed pain contract on file and can be found under the FYI section of the [facility] chart. 9. The pain contract is reviewed. Pain medication will be continued at the previous dosage. Urine drug screening will be done today. Diagnostic studies are not indicated at this time. Interventional procedure are not indicated at this time. 10. Medication prescibed is HYDROcodone-acetaminophen (NORCO) 5-325 mg per tablet .  has been reviewed at this OV by me. 11. Patient instructions have been reviewed in detail as outlined above and in the pain contract. 12. Re-eval is planned for 3 months. Patient Active Problem List   Diagnosis Code    Palpitations R00.2    Depression F32.9    Environmental allergies Z91.09    Breast cancer, stage 1 ([facility] [address] C50.919    GERD (gastroesophageal reflux disease) K21.9    Insomnia G47.00    Essential hypertension I10    ADD (attention deficit disorder) F98.8    Encounter for medication monitoring Z51.81    Severe obesity (BMI 35.0-39. 9) with comorbidity (HCC) E66.01       Current Outpatient Medications   Medication Sig Dispense Refill    metoprolol succinate (TOPROL-XL) 25 mg XL tablet Take 12.5 mg by mouth every evening.  HYDROcodone-acetaminophen (NORCO) 5-325 mg per tablet Take 1 Tab by mouth every eight (8) hours as needed for Pain (NO MORE THAN 3 PER DAY) for up to 99 days. Max Daily Amount: 2 Tabs.  21 Tab 0  celecoxib (CELEBREX) 200 mg capsule TAKE 1 CAPSULE TWICE A DAY AS NEEDED 180 Cap 3    pantoprazole (PROTONIX) 40 mg tablet TAKE 1 TABLET BY MOUTH EVERY DAY 90 Tab 3    EPINEPHrine (EPIPEN) 0.3 mg/0.3 mL injection USE AS DIRECTED FOR ALLERGIC REACTION  1    albuterol sulfate (PROAIR RESPICLICK) 90 mcg/actuation aepb Take 2 Puffs by inhalation every four (4) hours as needed (wheezing). 1 Inhaler 0    zolpidem CR (AMBIEN CR) 6.25 mg tablet TAKE 1 TABLET BY MOUTH EVERY EVENING AS NEEDED FOR SLEEP 90 Tab 1    PREVIDENT 5000 BOOSTER PLUS 1.1 % pste USE ONCE A DAY, PREFERABLY AT BEDTIME  4    ALPRAZolam (XANAX) 0.25 mg tablet Take 1.5 Tabs by mouth daily as needed for Anxiety. 45 Tab 1    desonide (TRIDESILON) 0.05 % topical lotion Apply  to affected area two (2) times daily as needed for Skin Irritation. 59 mL 0    mometasone (NASONEX) 50 mcg/actuation nasal spray 2 Sprays by Both Nostrils route as needed. 1 Container 11    diphenhydrAMINE (BENADRYL) 25 mg capsule Take 50 mg by mouth every six (6) hours as needed. Indications: ALLERGIC REACTIONS      metoprolol succinate (TOPROL-XL) 25 mg XL tablet Take  by mouth daily.  cetirizine (ZYRTEC) 10 mg tablet Take 10 mg by mouth nightly. Allergies   Allergen Reactions    Other Food Other (comments)     PT STATES OTHER FOOD ALLERGIES WITH VARYING REACTIONS:  PORK, LETTUCE, LIMA BEANS, BLACK PEPPER, TEA, MALT, WALNUTS.  Beef Containing Products Anaphylaxis    Food Extracts Anaphylaxis     Beef, milk, peanuts    Milk Anaphylaxis    Peanut Anaphylaxis    Alpha-D-Galactosidase Diarrhea    Azithromycin Nausea and Vomiting    Erythromycin Nausea and Vomiting    Guaifenesin Nausea and Vomiting    Morphine Nausea and Vomiting     Projectile vomiting per patient    Other Medication Other (comments)     WHITE CELL STIMULATOR INJECTION (NEULASTIN?) CAUSED SEVERE BONE PAIN LASTING A COUPLE WEEKS.          Past Medical History:   Diagnosis Date    Anemia 10/3/13    \"SINCE I WAS A KID\"    Anxiety     DURING CHEMO; PT STAYS XANAX AS OF 10/3/13    Cancer ([facility] [address] [id number]    breast,chemo ended 10/2012    Chronic pain     si joints sacrum    Depression     Nausea & vomiting     used patch in past with good results    Other ill-defined conditions(799.89)     anemia,chronic diarrhea, TMJ    Ovarian cyst     Palpitations     TAKES METOPROLOL FOR TACHYCARDIA    PUD (peptic ulcer disease)     occurred at age 33 .     Unspecified adverse effect of anesthesia     TMJ         Past Surgical History:   Procedure Laterality Date    BREAST SURGERY PROCEDURE UNLISTED      breast biopsy x3 left breast    HX BREAST RECONSTRUCTION  7/10/2013    BREAST NIPPLE RECONSTRUCTION REVISION performed by Ellen Alvarez MD at [facility]    HX BREAST RECONSTRUCTION  2012    REINCISION LEFT BREAST FOR REMAINDER OF BREAST CA    HX BREAST RECONSTRUCTION  2012    R SEROMA EXCISED, REPLACE IMPLANT R BREAST    HX  SECTION      HX ENDOSCOPY      [address] HX GI      anal fissure repair    HX GYN  10/21/13    [facility]/BSO    HX HYSTERECTOMY  10/2013    Dr. Nathalie Polk    HX MASTECTOMY  2012    double    HX OTHER SURGICAL      DENTAL SURGERIES WITH SEDATION    HX VASCULAR ACCESS  2012    PORTACATH INSERTION    HX VASCULAR ACCESS  2012    PORTCATH REMOVAL    LAMINOTOMY  2010    NC COLONOSCOPY FLX DX W/COLLJ SPEC WHEN PFRMD  2011    Dr Bender Patient         Family History   Problem Relation Age of Onset    Breast Cancer Mother 64    Cancer Mother 64         OF LUNG CA    Hypertension Father     Diabetes Father     Emphysema Father     COPD Father     Heart Attack Sister 50    Heart Disease Sister     Breast Cancer Sister     No Known Problems Brother     Anxiety Brother     No Known Problems Sister     Breast Cancer Paternal Grandmother     Malignant Hyperthermia Neg Hx     Pseudocholinesterase Deficiency Neg Hx  Delayed Awakening Neg Hx     Post-op Nausea/Vomiting Neg Hx     Emergence Delirium Neg Hx     Post-op Cognitive Dysfunction Neg Hx     Other Neg Hx        Social History     Tobacco Use    Smoking status: Never Smoker    Smokeless tobacco: Never Used   Substance Use Topics    Alcohol use: No     Alcohol/week: 0.0 standard drinks        Lab Results   Component Value Date/Time    WBC 7.7 04/08/2019 09:06 PM    HGB 12.2 04/08/2019 09:06 PM    HCT 38.0 04/08/2019 09:06 PM    PLATELET 414 09/27/0381 09:06 PM    MCV 80.0 04/08/2019 09:06 PM     Lab Results   Component Value Date/Time    Cholesterol, total 221 (H) 07/17/2018 03:50 PM    HDL Cholesterol 42 07/17/2018 03:50 PM    LDL, calculated 148 (H) 07/17/2018 03:50 PM    LDL-C, External 139 05/21/2015 01:33 PM    Triglyceride 153 (H) 07/17/2018 03:50 PM    CHOL/HDL Ratio 3.6 12/15/2009 02:10 PM     Lab Results   Component Value Date/Time    TSH 2.610 06/28/2019 11:53 AM    T4, Free 1.00 06/28/2019 11:53 AM      Lab Results   Component Value Date/Time    Sodium 139 04/22/2019 09:45 AM    Potassium 3.9 06/03/2019 04:33 PM    Chloride 102 04/22/2019 09:45 AM    CO2 21 04/22/2019 09:45 AM    Anion gap 7 04/08/2019 09:06 PM    Glucose 115 (H) 04/22/2019 09:45 AM    BUN 12 04/22/2019 09:45 AM    Creatinine 0.73 04/22/2019 09:45 AM    BUN/Creatinine ratio 16 04/22/2019 09:45 AM    GFR est  04/22/2019 09:45 AM    GFR est non-AA 95 04/22/2019 09:45 AM    Calcium 9.4 04/22/2019 09:45 AM    Bilirubin, total 0.3 04/08/2019 09:06 PM    ALT (SGPT) 45 04/08/2019 09:06 PM    AST (SGOT) 26 04/08/2019 09:06 PM    Alk.  phosphatase 108 04/08/2019 09:06 PM    Protein, total 7.6 04/08/2019 09:06 PM    Albumin 3.8 04/08/2019 09:06 PM    Globulin 3.8 04/08/2019 09:06 PM    A-G Ratio 1.0 (L) 04/08/2019 09:06 PM      Lab Results   Component Value Date/Time    Hemoglobin A1c (POC) 6.0 04/22/2019 09:50 AM    Hemoglobin A1c, External 5.7 05/21/2015 01:33 PM         Review of Systems   Constitutional: Negative for malaise/fatigue. HENT: Negative for congestion. Eyes: Negative for blurred vision. Respiratory: Negative for cough and shortness of breath. Cardiovascular: Negative for chest pain, palpitations and leg swelling. Gastrointestinal: Negative for abdominal pain, constipation and heartburn. Genitourinary: Negative for dysuria, frequency and urgency. Musculoskeletal: Positive for back pain. Negative for joint pain. Neurological: Negative for dizziness, tingling and headaches. Endo/Heme/Allergies: Negative for environmental allergies. Psychiatric/Behavioral: Negative for depression. The patient does not have insomnia. Physical Exam   Constitutional: She appears well-developed and well-nourished. /81 (BP 1 Location: Left arm, BP Patient Position: Sitting)  Pulse 75  Temp 97.7 °F (36.5 °C) (Oral)   Resp 16  Ht 5' 1\" (1.549 m)  Wt 192 lb 12.8 oz (87.5 kg)  LMP 08/03/2012  SpO2 95%   L/min  BMI 36.43 kg/m2   HENT:   Right Ear: Tympanic membrane and ear canal normal.   Left Ear: Tympanic membrane and ear canal normal.   Nose: No mucosal edema or rhinorrhea. Mouth/Throat: Oropharynx is clear and moist and mucous membranes are normal.   Neck: Normal range of motion. Neck supple. No thyromegaly present. Cardiovascular: Normal rate and regular rhythm. No murmur heard. Pulmonary/Chest: Effort normal. She has no wheezes. Abdominal: Soft. Bowel sounds are normal. There is no tenderness. Musculoskeletal: Normal range of motion. She exhibits no edema. Lymphadenopathy:     She has no cervical adenopathy. Skin: Skin is warm and dry. Psychiatric: She has a normal mood and affect. Nursing note and vitals reviewed. ASSESSMENT and PLAN  Diagnoses and all orders for this visit:    1. Essential hypertension  -     Increase metoprolol succinate (TOPROL-XL) 25 mg XL tablet; Take 1 Tab by mouth every evening.   Discussed sodium restriction, high k rich diet, maintaining ideal body weight and regular exercise program such as daily walking 30 min perday 4-5 times per week, as physiologic means to achieve blood pressure control.  Medication compliance advised. 2. Chronic right-sided thoracic back pain  -     XR SPINE THORAC 3 V; Future  No acute process. Instructions for exercises given and reviewed with pt. Pt also to use heat to the area 3-4 times a day over the next several days until sx are improved. 3. Chronic midline low back pain without sciatica  -     HYDROcodone-acetaminophen (NORCO) 5-325 mg per tablet; Take 1 Tab by mouth every eight (8) hours as needed for Pain for up to 99 days. Max Daily Amount: 2 Tabs. 4. Lumbar arthropathy  -     HYDROcodone-acetaminophen (NORCO) 5-325 mg per tablet; Take 1 Tab by mouth every eight (8) hours as needed for Pain for up to 99 days. Max Daily Amount: 2 Tabs. 5. Encounter for chronic pain management  -     9-DRUG SCREEN + OXY, UR  -     HYDROcodone-acetaminophen (NORCO) 5-325 mg per tablet; Take 1 Tab by mouth every eight (8) hours as needed for Pain for up to 99 days. Max Daily Amount: 2 Tabs. The pt has signed medication agreement. Pain contract is reviewed. Pain medications will be continued at the previous dosage. Urine drug screening will be done today. Diagnostic  studies are not indicated at this time. Interventional procedure are not indicated at this time. Re-eval in 3 months. 6. Acute cystitis without hematuria  -     AMB POC URINALYSIS DIP STICK AUTO W/O MICRO    7. Gastroesophageal reflux disease without esophagitis  Stable on protonix. 8. Encounter for medication monitoring      Follow-up and Dispositions    · Return in about 4 months (around 1/9/2020).        reviewed diet, exercise and weight control  cardiovascular risk and specific lipid/LDL goals reviewed  reviewed medications and side effects in detail    I have discussed diagnosis listed in this note with pt and/or family. I have discussed treatment plans and options and the risk/benefit analysis of those options, including safe use of medications and possible medication side effects. Through the use of shared decision making we have agreed to the above plan. The patient has received an after-visit summary and questions were answered concerning future plans and follow up. Advise pt of any urgent changes then to proceed to the ER.

## 2019-09-09 NOTE — PROGRESS NOTES
Chief Complaint   Patient presents with    Medication Management     Pain contract up to date.  ran and reviewed By Dr. Rose. Colonoscopy 9/14/2011by Dr. Harjinder Acuña repeat in 10 years      1. Have you been to the ER, urgent care clinic since your last visit? Hospitalized since your last visit? No    2. Have you seen or consulted any other health care providers outside of the [address] [place] [address] since your last visit? Include any pap smears or colon screening.  No

## 2019-10-22 DIAGNOSIS — F51.01 PRIMARY INSOMNIA: ICD-10-CM

## 2019-10-22 RX ORDER — ZOLPIDEM TARTRATE 6.25 MG/1
TABLET, FILM COATED, EXTENDED RELEASE ORAL
Qty: 90 TAB | Refills: 0 | OUTPATIENT
Start: 2019-10-22 | End: 2020-01-03 | Stop reason: SDUPTHER

## 2019-10-25 ENCOUNTER — LAB ONLY (OUTPATIENT)
Dept: FAMILY MEDICINE CLINIC | Age: 54
End: 2019-10-25

## 2019-10-25 DIAGNOSIS — G89.29 ENCOUNTER FOR CHRONIC PAIN MANAGEMENT: ICD-10-CM

## 2019-10-25 DIAGNOSIS — G89.29 CHRONIC MIDLINE LOW BACK PAIN WITHOUT SCIATICA: ICD-10-CM

## 2019-10-25 DIAGNOSIS — G89.29 ENCOUNTER FOR CHRONIC PAIN MANAGEMENT: Primary | ICD-10-CM

## 2019-10-25 DIAGNOSIS — M47.816 LUMBAR ARTHROPATHY: ICD-10-CM

## 2019-10-25 DIAGNOSIS — M54.50 CHRONIC MIDLINE LOW BACK PAIN WITHOUT SCIATICA: ICD-10-CM

## 2019-10-25 LAB
BILIRUB UR QL STRIP: NEGATIVE
GLUCOSE UR-MCNC: NEGATIVE MG/DL
KETONES P FAST UR STRIP-MCNC: NEGATIVE MG/DL
PH UR STRIP: 5.5 [PH] (ref 4.6–8)
PROT UR QL STRIP: NEGATIVE
SP GR UR STRIP: 1 (ref 1–1.03)
UA UROBILINOGEN AMB POC: NORMAL (ref 0.2–1)
URINALYSIS CLARITY POC: CLEAR
URINALYSIS COLOR POC: YELLOW
URINE BLOOD POC: NEGATIVE
URINE LEUKOCYTES POC: NEGATIVE
URINE NITRITES POC: NEGATIVE

## 2019-10-25 RX ORDER — HYDROCODONE BITARTRATE AND ACETAMINOPHEN 5; 325 MG/1; MG/1
1 TABLET ORAL
Qty: 21 TAB | Refills: 0 | Status: SHIPPED | OUTPATIENT
Start: 2019-10-25 | End: 2020-01-03 | Stop reason: SDUPTHER

## 2019-10-26 LAB
AMPHETAMINES UR QL SCN: NEGATIVE NG/ML
BARBITURATES UR QL SCN: NEGATIVE NG/ML
BENZODIAZ UR QL SCN: NEGATIVE NG/ML
BZE UR QL SCN: NEGATIVE NG/ML
CANNABINOIDS UR QL SCN: NEGATIVE NG/ML
CREAT UR-MCNC: 40 MG/DL (ref 20–300)
METHADONE UR QL SCN: NEGATIVE NG/ML
OPIATES UR QL SCN: NEGATIVE NG/ML
OXYCODONE+OXYMORPHONE UR QL SCN: NEGATIVE NG/ML
PCP UR QL: NEGATIVE NG/ML
PH UR: 5.5 [PH] (ref 4.5–8.9)
PLEASE NOTE:, 733163: NORMAL
PROPOXYPH UR QL SCN: NEGATIVE NG/ML

## 2019-11-22 DIAGNOSIS — G89.29 CHRONIC MIDLINE LOW BACK PAIN WITHOUT SCIATICA: ICD-10-CM

## 2019-11-22 DIAGNOSIS — M54.50 CHRONIC MIDLINE LOW BACK PAIN WITHOUT SCIATICA: ICD-10-CM

## 2019-11-22 RX ORDER — CELECOXIB 200 MG/1
200 CAPSULE ORAL 2 TIMES DAILY
Qty: 180 CAP | Refills: 3 | Status: SHIPPED | OUTPATIENT
Start: 2019-11-22 | End: 2020-12-21 | Stop reason: SDUPTHER

## 2020-01-03 ENCOUNTER — OFFICE VISIT (OUTPATIENT)
Dept: FAMILY MEDICINE CLINIC | Age: 55
End: 2020-01-03

## 2020-01-03 VITALS
SYSTOLIC BLOOD PRESSURE: 118 MMHG | HEART RATE: 69 BPM | DIASTOLIC BLOOD PRESSURE: 65 MMHG | OXYGEN SATURATION: 95 % | BODY MASS INDEX: 39.23 KG/M2 | HEIGHT: 60 IN | WEIGHT: 199.8 LBS | TEMPERATURE: 97.8 F | RESPIRATION RATE: 18 BRPM

## 2020-01-03 DIAGNOSIS — I10 ESSENTIAL HYPERTENSION: Primary | ICD-10-CM

## 2020-01-03 DIAGNOSIS — F51.01 PRIMARY INSOMNIA: ICD-10-CM

## 2020-01-03 DIAGNOSIS — R73.02 IGT (IMPAIRED GLUCOSE TOLERANCE): ICD-10-CM

## 2020-01-03 DIAGNOSIS — Z51.81 ENCOUNTER FOR MEDICATION MONITORING: ICD-10-CM

## 2020-01-03 DIAGNOSIS — E55.9 HYPOVITAMINOSIS D: ICD-10-CM

## 2020-01-03 DIAGNOSIS — J06.9 UPPER RESPIRATORY TRACT INFECTION, UNSPECIFIED TYPE: ICD-10-CM

## 2020-01-03 DIAGNOSIS — M54.50 CHRONIC MIDLINE LOW BACK PAIN WITHOUT SCIATICA: ICD-10-CM

## 2020-01-03 DIAGNOSIS — G89.29 ENCOUNTER FOR CHRONIC PAIN MANAGEMENT: ICD-10-CM

## 2020-01-03 DIAGNOSIS — Z01.89 ENCOUNTER FOR PAIN MANAGEMENT PLANNING: ICD-10-CM

## 2020-01-03 DIAGNOSIS — M47.816 LUMBAR ARTHROPATHY: ICD-10-CM

## 2020-01-03 DIAGNOSIS — G89.29 CHRONIC MIDLINE LOW BACK PAIN WITHOUT SCIATICA: ICD-10-CM

## 2020-01-03 DIAGNOSIS — K21.9 GASTROESOPHAGEAL REFLUX DISEASE WITHOUT ESOPHAGITIS: ICD-10-CM

## 2020-01-03 RX ORDER — DOXYCYCLINE 100 MG/1
100 CAPSULE ORAL 2 TIMES DAILY
Qty: 20 CAP | Refills: 0 | Status: SHIPPED | OUTPATIENT
Start: 2020-01-03 | End: 2020-01-13

## 2020-01-03 RX ORDER — HYDROCODONE BITARTRATE AND ACETAMINOPHEN 5; 325 MG/1; MG/1
1 TABLET ORAL
Qty: 21 TAB | Refills: 0 | Status: SHIPPED | OUTPATIENT
Start: 2020-01-03 | End: 2020-03-05 | Stop reason: SDUPTHER

## 2020-01-03 RX ORDER — ZOLPIDEM TARTRATE 6.25 MG/1
6.25 TABLET, FILM COATED, EXTENDED RELEASE ORAL
Qty: 90 TAB | Refills: 0 | Status: SHIPPED | OUTPATIENT
Start: 2020-01-03 | End: 2020-04-10 | Stop reason: SDUPTHER

## 2020-01-03 NOTE — PROGRESS NOTES
HISTORY OF PRESENT ILLNESS  Corrinne Mitts is a 49 y.o. female. Follow up on chronic medical problems. C/o nasal congestion and cough for the past few days. Coughing up phlegm. No fever or chills noted. Has malaise. Throat is scratchy. Has post nasal drainage. No chest pain or SOB. No wheezing noted. She has other family members at home who are also sick with cold sx. Cardiovascular Review:  The patient has hypertension. Diet and Lifestyle: generally follows a low fat low cholesterol diet, generally follows a low sodium diet, exercises sporadically  Home BP Monitoring: is not measured at home. Pertinent ROS: taking medications as instructed, no medication side effects noted, no TIA's, no chest pain on exertion, no dyspnea on exertion, no swelling of ankles. Anxiety Review:  Patient is seen for ADD, sleep disturbance and anxiety. Current treatment includes Xanax. Ongoing symptoms include: insomnia. She is using [place] for sleep. Reported side effects from the treatment: none. Encounter for pain management. 1./2. Medical history/Past medical History  Chronic Pain:  Patient has osteoarthritis in the lower back and suffer with chronic pain in her lower back. She has been having increased pain at times in the mid right side of the back. Has been taking celebrex twice a day as long as she does not have GI upset. She uses the hydrocodone for more severe pain. Pain ranges from 6-9/10. MRI lumbar spine in 2009 showed DDD then with bulging disc and central radial teat in L4-5. She had laminectomy in 2010 for one disc. She has had multiple steroid epidural injections. She is still having some upper back pain that comes and goes. She did not get xray done from last visit. Symptoms onset: problem is longstanding. Rheumatological ROS: stable, mild-to-moderate joint symptoms intermittently, reasonably well controlled by PRN meds. Response to treatment plan: stable.    3. Applicable records from prior treatment providers are apart of [facility] under the media tab. 4. Diagnostic, therapeutic and laboratory results are available in the [facility] chart. 5. Consultation notes are available for review in the media tab of the [facility] chart. 6. Treatment goals include pain control so that the pt may be as active and function with her daily activities and improved comfort level. Previously pt has been limited by pain. 7. The risks and benefits of treatment has been discussed at this office visit with the pt. She understands that the medication has addicting potential.  Additionally the pt has been advised that narcotic pain medication may impair mental and/or physical ability required for performance of tasks such as driving or operating any other machinery. 8. Pt has an updated signed pain contract on file and can be found under the FYI section of the [facility] chart. 9. The pain contract is reviewed. Pain medication will be continued at the previous dosage. Urine drug screening will be done today. Diagnostic studies are not indicated at this time. Interventional procedure are not indicated at this time. 10. Medication prescibed is HYDROcodone-acetaminophen (NORCO) 5-325 mg per tablet .  has been reviewed at this OV by me. 11. Patient instructions have been reviewed in detail as outlined above and in the pain contract. 12. Re-eval is planned for 3 months. Patient Active Problem List   Diagnosis Code    Palpitations R00.2    Depression F32.9    Environmental allergies Z91.09    GERD (gastroesophageal reflux disease) K21.9    Insomnia G47.00    Essential hypertension I10    ADD (attention deficit disorder) F98.8    Encounter for medication monitoring Z51.81    Severe obesity (BMI 35.0-39. 9) with comorbidity (HCC) E66.01       Current Outpatient Medications   Medication Sig Dispense Refill    HYDROcodone-acetaminophen (NORCO) 5-325 mg per tablet Take 1 Tab by mouth every eight (8) hours as needed for Pain for up to 99 days. Max Daily Amount: 2 Tabs. 21 Tab 0    zolpidem CR (AMBIEN CR) 6.25 mg tablet Take 1 Tab by mouth nightly as needed for Sleep. Max Daily Amount: 6.25 mg. 90 Tab 0    doxycycline (VIBRAMYCIN) 100 mg capsule Take 1 Cap by mouth two (2) times a day for 10 days. 20 Cap 0    celecoxib (CELEBREX) 200 mg capsule Take 1 Cap by mouth two (2) times a day. 180 Cap 3    pantoprazole (PROTONIX) 40 mg tablet TAKE 1 TABLET BY MOUTH EVERY DAY 90 Tab 3    EPINEPHrine (EPIPEN) 0.3 mg/0.3 mL injection USE AS DIRECTED FOR ALLERGIC REACTION  1    metoprolol succinate (TOPROL-XL) 25 mg XL tablet Take 12.5 mg by mouth daily.  PREVIDENT 5000 BOOSTER PLUS 1.1 % pste USE ONCE A DAY, PREFERABLY AT BEDTIME  4    ALPRAZolam (XANAX) 0.25 mg tablet Take 1.5 Tabs by mouth daily as needed for Anxiety. 45 Tab 1    desonide (TRIDESILON) 0.05 % topical lotion Apply  to affected area two (2) times daily as needed for Skin Irritation. 59 mL 0    mometasone (NASONEX) 50 mcg/actuation nasal spray 2 Sprays by Both Nostrils route as needed. 1 Container 11    diphenhydrAMINE (BENADRYL) 25 mg capsule Take 50 mg by mouth every six (6) hours as needed. Indications: ALLERGIC REACTIONS      cetirizine (ZYRTEC) 10 mg tablet Take 10 mg by mouth daily as needed.  albuterol sulfate (PROAIR RESPICLICK) 90 mcg/actuation aepb Take 2 Puffs by inhalation every four (4) hours as needed (wheezing). (Patient not taking: Reported on 1/3/2020) 1 Inhaler 0       Allergies   Allergen Reactions    Other Food Other (comments)     PT STATES OTHER FOOD ALLERGIES WITH VARYING REACTIONS:  PORK, LETTUCE, LIMA BEANS, BLACK PEPPER, TEA, MALT, WALNUTS.     Beef Containing Products Anaphylaxis    Food Extracts Anaphylaxis     Beef, milk, peanuts    Milk Anaphylaxis    Peanut Anaphylaxis    Alpha-D-Galactosidase Diarrhea    Azithromycin Nausea and Vomiting    Erythromycin Nausea and Vomiting    Guaifenesin Nausea and Vomiting    Morphine Nausea and Vomiting     Projectile vomiting per patient    Other Medication Other (comments)     WHITE CELL STIMULATOR INJECTION (NEULASTIN?) CAUSED SEVERE BONE PAIN LASTING A COUPLE WEEKS. Past Medical History:   Diagnosis Date    Anemia 10/3/13    \"SINCE I WAS A KID\"    Anxiety     DURING CHEMO; PT STAYS XANAX AS OF 10/3/13    Cancer ([facility] [address] [id number]    breast,chemo ended 10/2012    Chronic pain     si joints sacrum    Depression     Nausea & vomiting     used patch in past with good results    Other ill-defined conditions(799.89)     anemia,chronic diarrhea, TMJ    Ovarian cyst     Palpitations     TAKES METOPROLOL FOR TACHYCARDIA    PUD (peptic ulcer disease)     occurred at age 33 .     Unspecified adverse effect of anesthesia     TMJ         Past Surgical History:   Procedure Laterality Date    BREAST SURGERY PROCEDURE UNLISTED      breast biopsy x3 left breast    HX BREAST RECONSTRUCTION  7/10/2013    BREAST NIPPLE RECONSTRUCTION [facility]VISION [facility] [address] [facility] BRE[facility] RECONSTRUCTION  2012    REINCISION LEFT BREAST FOR REMAINDER OF BREAST CA    HX BREAST RECONSTRUCTION  2012    R SEROMA EXCISED, REPLACE IMPLANT R BREAST    HX  SECTION      [facility] ENDOS[facility]      [facility].    HX GI      anal fissure repair    HX GYN  10/21/13    TLH/BSO    HX HYSTERECTOMY  10/2013    Dr. Max Walton    HX MASTECTOMY  2012    double    HX OTHER SURGICAL      DENTAL SURGERIES WITH SEDATION    HX VASCULAR ACCESS  2012    PORTACATH INSERTION    HX VASCULAR ACCESS  2012    PORTCATH REMOVAL    LAMINOTOMY  2010    [place] COLONOSCOPY FLX DX W/COLLJ SPEC WHEN PFRMD  2011    Dr Susi Peña         Family History   Problem Relation Age of Onset   Reyes Breast Cancer Mother 64    Cancer Mother 64         OF LUNG CA    Hypertension Father     Diabetes Father    Reyes Emphysema Father     COPD Father     Heart Attack Sister 50    Heart Disease Sister     Breast Cancer Sister     No Known Problems Brother     Anxiety Brother     No Known Problems Sister     Breast Cancer Paternal Grandmother     Malignant Hyperthermia Neg Hx     Pseudocholinesterase Deficiency Neg Hx     Delayed Awakening Neg Hx     Post-op Nausea/Vomiting Neg Hx     Emergence Delirium Neg Hx     Post-op Cognitive Dysfunction Neg Hx     Other Neg Hx        Social History     Tobacco Use    Smoking status: Never Smoker    Smokeless tobacco: Never Used   Substance Use Topics    Alcohol use: No     Alcohol/week: 0.0 standard drinks        Lab Results   Component Value Date/Time    WBC 7.7 04/08/2019 09:06 PM    HGB 12.2 04/08/2019 09:06 PM    HCT 38.0 04/08/2019 09:06 PM    PLATELET 460 22/72/3051 09:06 PM    MCV 80.0 04/08/2019 09:06 PM     Lab Results   Component Value Date/Time    Cholesterol, total 221 (H) 07/17/2018 03:50 PM    HDL Cholesterol 42 07/17/2018 03:50 PM    LDL, calculated 148 (H) 07/17/2018 03:50 PM    LDL-C, External 139 05/21/2015 01:33 PM    Triglyceride 153 (H) 07/17/2018 03:50 PM    CHOL/HDL Ratio 3.6 12/15/2009 02:10 PM     Lab Results   Component Value Date/Time    TSH 2.610 06/28/2019 11:53 AM    T4, Free 1.00 06/28/2019 11:53 AM      Lab Results   Component Value Date/Time    Sodium 139 04/22/2019 09:45 AM    Potassium 3.9 06/03/2019 04:33 PM    Chloride 102 04/22/2019 09:45 AM    CO2 21 04/22/2019 09:45 AM    Anion gap 7 04/08/2019 09:06 PM    Glucose 115 (H) 04/22/2019 09:45 AM    BUN 12 04/22/2019 09:45 AM    Creatinine 0.73 04/22/2019 09:45 AM    BUN/Creatinine ratio 16 04/22/2019 09:45 AM    GFR est  04/22/2019 09:45 AM    GFR est non-AA 95 04/22/2019 09:45 AM    Calcium 9.4 04/22/2019 09:45 AM    Bilirubin, total 0.3 04/08/2019 09:06 PM    ALT (SGPT) 45 04/08/2019 09:06 PM    AST (SGOT) 26 04/08/2019 09:06 PM    Alk.  phosphatase 108 04/08/2019 09:06 PM Protein, total 7.6 04/08/2019 09:06 PM    Albumin 3.8 04/08/2019 09:06 PM    Globulin 3.8 04/08/2019 09:06 PM    A-G Ratio 1.0 (L) 04/08/2019 09:06 PM      Lab Results   Component Value Date/Time    Hemoglobin A1c (POC) 6.0 04/22/2019 09:50 AM    Hemoglobin A1c, External 5.7 05/21/2015 01:33 PM         Review of Systems   Constitutional: Negative for malaise/fatigue. HENT: Negative for congestion. Eyes: Negative for blurred vision. Respiratory: Negative for cough and shortness of breath. Cardiovascular: Negative for chest pain, palpitations and leg swelling. Gastrointestinal: Negative for abdominal pain, constipation and heartburn. Genitourinary: Negative for dysuria, frequency and urgency. Musculoskeletal: Positive for back pain. Negative for joint pain. Neurological: Negative for dizziness, tingling and headaches. Endo/Heme/Allergies: Negative for environmental allergies. Psychiatric/Behavioral: Negative for depression. The patient does not have insomnia. Physical Exam   Constitutional: She appears well-developed and well-nourished. /65 (BP 1 Location: Right arm, BP Patient Position: Sitting)   Pulse 69   Temp 97.8 °F (36.6 °C) (Oral)   Resp 18   Ht 5' (1.524 m)   Wt 199 lb 12.8 oz (90.6 kg)   LMP 08/03/2012   SpO2 95%    L/min   BMI 39.02 kg/m²    HENT:   Right Ear: Tympanic membrane and ear canal normal.   Left Ear: Tympanic membrane and ear canal normal.   Nose: Mucosal edema and rhinorrhea present. Mouth/Throat: Mucous membranes are normal. Posterior oropharyngeal erythema present. No oropharyngeal exudate. Neck: Trachea normal, normal range of motion and full passive range of motion without pain. Neck supple. No edema present. No thyromegaly present. Cardiovascular: Normal rate, regular rhythm and normal heart sounds. Exam reveals no gallop. Pulmonary/Chest: Effort normal and breath sounds normal. She has no wheezes. She has no rales.    Abdominal: Soft. Normal appearance and bowel sounds are normal. She exhibits no mass. There is no tenderness. Musculoskeletal: Normal range of motion. General: No edema. Lymphadenopathy:     She has no cervical adenopathy. Skin: Skin is warm and dry. Psychiatric: She has a normal mood and affect. Nursing note and vitals reviewed. ASSESSMENT and PLAN  Diagnoses and all orders for this visit:    1. Essential hypertension  -     LIPID PANEL    2. IGT (impaired glucose tolerance)  -     HEMOGLOBIN A1C WITH EAG    3. Primary insomnia  -     zolpidem CR (AMBIEN CR) 6.25 mg tablet; Take 1 Tab by mouth nightly as needed for Sleep. Max Daily Amount: 6.25 mg.    4. Chronic midline low back pain without sciatica  -     HYDROcodone-acetaminophen (NORCO) 5-325 mg per tablet; Take 1 Tab by mouth every eight (8) hours as needed for Pain for up to 99 days. Max Daily Amount: 2 Tabs. 5. Lumbar arthropathy  -     HYDROcodone-acetaminophen (NORCO) 5-325 mg per tablet; Take 1 Tab by mouth every eight (8) hours as needed for Pain for up to 99 days. Max Daily Amount: 2 Tabs. 6. Encounter for chronic pain management  -     HYDROcodone-acetaminophen (NORCO) 5-325 mg per tablet; Take 1 Tab by mouth every eight (8) hours as needed for Pain for up to 99 days. Max Daily Amount: 2 Tabs. The pt has signed medication agreement. Pain contract is reviewed. Pain medications will be continued at the previous dosage. Urine drug screening will be done today. Diagnostic  studies are not indicated at this time. Interventional procedure are not indicated at this time. Re-eval in 3 months. 7. Gastroesophageal reflux disease without esophagitis  Stable on protonix    8. Hypovitaminosis D  -     VITAMIN D, 25 HYDROXY    9.  Encounter for pain management planning  -     MONITOR SCREEN 10-DRUG CLASS PROFILE    10. Encounter for medication monitoring  -     CBC W/O DIFF  -     METABOLIC PANEL, COMPREHENSIVE    11. Upper respiratory tract infection, unspecified type  -     doxycycline (VIBRAMYCIN) 100 mg capsule; Take 1 Cap by mouth two (2) times a day for 10 days. (only to start if sx are persisting after another 3 to 4 days)      Follow-up and Dispositions    · Return in about 3 months (around 4/3/2020). reviewed diet, exercise and weight control  cardiovascular risk and specific lipid/LDL goals reviewed  reviewed medications and side effects in detail  specific diabetic recommendations: low cholesterol diet, weight control and daily exercise discussed and glycohemoglobin and other lab monitoring discussed     I have discussed diagnosis listed in this note with pt and/or family. I have discussed treatment plans and options and the risk/benefit analysis of those options, including safe use of medications and possible medication side effects. Through the use of shared decision making we have agreed to the above plan. The patient has received an after-visit summary and questions were answered concerning future plans and follow up. Advise pt of any urgent changes then to proceed to the ER.

## 2020-01-03 NOTE — PROGRESS NOTES
Chief Complaint   Patient presents with    Hypertension     3 Month Follow Up    Pain (Chronic)    Cough     x 1 day    Nasal Congestion     x 5 days     1. Have you been to the ER, urgent care clinic since your last visit? Hospitalized since your last visit? No    2. Have you seen or consulted any other health care providers outside of the [address] [place] [address] since your last visit? Include any pap smears or colon screening.  No    Health Maintenance Due   Topic Date Due    Shingrix Vaccine Age 49> (1 of 2) 06/12/2016

## 2020-01-04 LAB
25(OH)D3+25(OH)D2 SERPL-MCNC: 25.4 NG/ML (ref 30–100)
ALBUMIN SERPL-MCNC: 4.7 G/DL (ref 3.5–5.5)
ALBUMIN/GLOB SERPL: 1.8 {RATIO} (ref 1.2–2.2)
ALP SERPL-CCNC: 105 IU/L (ref 39–117)
ALT SERPL-CCNC: 43 IU/L (ref 0–32)
AMPHETAMINES UR QL SCN: NEGATIVE NG/ML
AST SERPL-CCNC: 26 IU/L (ref 0–40)
BARBITURATES UR QL SCN: NEGATIVE NG/ML
BENZODIAZ UR QL SCN: NEGATIVE NG/ML
BILIRUB SERPL-MCNC: 0.4 MG/DL (ref 0–1.2)
BUN SERPL-MCNC: 10 MG/DL (ref 6–24)
BUN/CREAT SERPL: 14 (ref 9–23)
BZE UR QL SCN: NEGATIVE NG/ML
CALCIUM SERPL-MCNC: 9.7 MG/DL (ref 8.7–10.2)
CANNABINOIDS UR QL SCN: NEGATIVE NG/ML
CHLORIDE SERPL-SCNC: 103 MMOL/L (ref 96–106)
CHOLEST SERPL-MCNC: 224 MG/DL (ref 100–199)
CO2 SERPL-SCNC: 20 MMOL/L (ref 20–29)
CREAT SERPL-MCNC: 0.74 MG/DL (ref 0.57–1)
CREAT UR-MCNC: 171.3 MG/DL (ref 20–300)
ERYTHROCYTE [DISTWIDTH] IN BLOOD BY AUTOMATED COUNT: 14.3 % (ref 12.3–15.4)
EST. AVERAGE GLUCOSE BLD GHB EST-MCNC: 123 MG/DL
GLOBULIN SER CALC-MCNC: 2.6 G/DL (ref 1.5–4.5)
GLUCOSE SERPL-MCNC: 118 MG/DL (ref 65–99)
HBA1C MFR BLD: 5.9 % (ref 4.8–5.6)
HCT VFR BLD AUTO: 39 % (ref 34–46.6)
HDLC SERPL-MCNC: 40 MG/DL
HGB BLD-MCNC: 13.4 G/DL (ref 11.1–15.9)
INTERPRETATION, 910389: NORMAL
LDLC SERPL CALC-MCNC: 156 MG/DL (ref 0–99)
MCH RBC QN AUTO: 26.3 PG (ref 26.6–33)
MCHC RBC AUTO-ENTMCNC: 34.4 G/DL (ref 31.5–35.7)
MCV RBC AUTO: 77 FL (ref 79–97)
METHADONE UR QL SCN: NEGATIVE NG/ML
OPIATES UR QL SCN: NEGATIVE NG/ML
OXYCODONE+OXYMORPHONE UR QL SCN: NEGATIVE NG/ML
PCP UR QL: NEGATIVE NG/ML
PH UR: 5.4 [PH] (ref 4.5–8.9)
PLATELET # BLD AUTO: 323 X10E3/UL (ref 150–450)
PLEASE NOTE:, 733163: NORMAL
POTASSIUM SERPL-SCNC: 4.3 MMOL/L (ref 3.5–5.2)
PROPOXYPH UR QL SCN: NEGATIVE NG/ML
PROT SERPL-MCNC: 7.3 G/DL (ref 6–8.5)
RBC # BLD AUTO: 5.09 X10E6/UL (ref 3.77–5.28)
SODIUM SERPL-SCNC: 140 MMOL/L (ref 134–144)
TRIGL SERPL-MCNC: 138 MG/DL (ref 0–149)
VLDLC SERPL CALC-MCNC: 28 MG/DL (ref 5–40)
WBC # BLD AUTO: 7.7 X10E3/UL (ref 3.4–10.8)

## 2020-03-05 DIAGNOSIS — M47.816 LUMBAR ARTHROPATHY: ICD-10-CM

## 2020-03-05 DIAGNOSIS — G89.29 CHRONIC MIDLINE LOW BACK PAIN WITHOUT SCIATICA: ICD-10-CM

## 2020-03-05 DIAGNOSIS — M54.50 CHRONIC MIDLINE LOW BACK PAIN WITHOUT SCIATICA: ICD-10-CM

## 2020-03-05 DIAGNOSIS — G89.29 ENCOUNTER FOR CHRONIC PAIN MANAGEMENT: ICD-10-CM

## 2020-03-06 RX ORDER — HYDROCODONE BITARTRATE AND ACETAMINOPHEN 5; 325 MG/1; MG/1
1 TABLET ORAL
Qty: 21 TAB | Refills: 0 | Status: SHIPPED | OUTPATIENT
Start: 2020-03-06 | End: 2020-05-28 | Stop reason: SDUPTHER

## 2020-03-11 ENCOUNTER — OFFICE VISIT (OUTPATIENT)
Dept: FAMILY MEDICINE CLINIC | Age: 55
End: 2020-03-11

## 2020-03-11 VITALS
HEIGHT: 60 IN | OXYGEN SATURATION: 96 % | DIASTOLIC BLOOD PRESSURE: 78 MMHG | TEMPERATURE: 97.5 F | SYSTOLIC BLOOD PRESSURE: 138 MMHG | BODY MASS INDEX: 39.85 KG/M2 | HEART RATE: 66 BPM | WEIGHT: 203 LBS | RESPIRATION RATE: 20 BRPM

## 2020-03-11 DIAGNOSIS — R53.81 MALAISE: ICD-10-CM

## 2020-03-11 DIAGNOSIS — J20.9 BRONCHITIS WITH BRONCHOSPASM: Primary | ICD-10-CM

## 2020-03-11 LAB
FLUAV+FLUBV AG NOSE QL IA.RAPID: NEGATIVE POS/NEG
FLUAV+FLUBV AG NOSE QL IA.RAPID: NEGATIVE POS/NEG
VALID INTERNAL CONTROL?: YES

## 2020-03-11 RX ORDER — DOXYCYCLINE 100 MG/1
100 CAPSULE ORAL 2 TIMES DAILY
Qty: 20 CAP | Refills: 0 | Status: SHIPPED | OUTPATIENT
Start: 2020-03-11 | End: 2020-03-21

## 2020-03-11 RX ORDER — PREDNISONE 10 MG/1
10 TABLET ORAL 2 TIMES DAILY
Qty: 10 TAB | Refills: 0 | Status: SHIPPED | OUTPATIENT
Start: 2020-03-11 | End: 2020-03-16

## 2020-03-11 RX ORDER — AZITHROMYCIN 250 MG/1
TABLET, FILM COATED ORAL
Qty: 6 TAB | Refills: 0 | Status: SHIPPED | OUTPATIENT
Start: 2020-03-11 | End: 2020-03-11 | Stop reason: SINTOL

## 2020-03-11 RX ORDER — PROMETHAZINE HYDROCHLORIDE AND CODEINE PHOSPHATE 6.25; 1 MG/5ML; MG/5ML
1 SOLUTION ORAL
Qty: 180 ML | Refills: 0 | Status: SHIPPED | OUTPATIENT
Start: 2020-03-11 | End: 2020-04-10

## 2020-03-11 NOTE — PROGRESS NOTES
Chief Complaint   Patient presents with    Nasal Congestion     x 1 week    Ear Fullness     patient c/o right ear feeling clogged     Headache     patient c/o sinus headache on and off            1. Have you been to the ER, urgent care clinic since your last visit? Hospitalized since your last visit? No    2. Have you seen or consulted any other health care providers outside of the [address] [place] [address] since your last visit? Include any pap smears or colon screening.  No

## 2020-03-11 NOTE — PROGRESS NOTES
HISTORY OF PRESENT ILLNESS  Domonique Khoury is a 49 y.o. female. HPI   C/o nasal congestion, headache and pressure in the face and cough for the past 1 week. Coughing up thick phlegm. Had fever up to 103 over the weekend. Has malaise. Throat is scratchy. Has post nasal drainage. No chest pain or SOB. Has had some intermittent wheezing. Increase cough at night.  at home sick also with similar sx. No travel. Patient Active Problem List   Diagnosis Code    Palpitations R00.2    Depression F32.9    Environmental allergies Z91.09    GERD (gastroesophageal reflux disease) K21.9    Insomnia G47.00    Essential hypertension I10    ADD (attention deficit disorder) F98.8    Encounter for medication monitoring Z51.81    Severe obesity (BMI 35.0-39. 9) with comorbidity ([facility]) E66.01       Current Outpatient Medications   Medication Sig Dispense Refill    albuterol sulfate (ProAir RespiClick) 90 mcg/actuation aepb Take 2 Puffs by inhalation every four (4) hours as needed for Wheezing.  predniSONE (DELTASONE) 10 mg tablet Take 10 mg by mouth two (2) times a day for 5 days. 10 Tab 0    promethazine-codeine (PHENERGAN with CODEINE) 6.25-10 mg/5 mL syrup Take 5 mL by mouth every six (6) hours as needed for Cough for up to 30 days. Max Daily Amount: 20 mL. 180 mL 0    doxycycline (VIBRAMYCIN) 100 mg capsule Take 1 Cap by mouth two (2) times a day for 10 days. 20 Cap 0    HYDROcodone-acetaminophen (NORCO) 5-325 mg per tablet Take 1 Tab by mouth every eight (8) hours as needed for Pain for up to 99 days. Max Daily Amount: 2 Tabs. 21 Tab 0    zolpidem CR (AMBIEN CR) 6.25 mg tablet Take 1 Tab by mouth nightly as needed for Sleep. Max Daily Amount: 6.25 mg. 90 Tab 0    celecoxib (CELEBREX) 200 mg capsule Take 1 Cap by mouth two (2) times a day.  180 Cap 3    pantoprazole (PROTONIX) 40 mg tablet TAKE 1 TABLET BY MOUTH EVERY DAY 90 Tab 3    EPINEPHrine (EPIPEN) 0.3 mg/0.3 mL injection USE AS DIRECTED FOR ALLERGIC REACTION  1    metoprolol succinate (TOPROL-XL) 25 mg XL tablet Take 12.5 mg by mouth daily.  PREVIDENT 5000 BOOSTER PLUS 1.1 % pste USE ONCE A DAY, PREFERABLY AT BEDTIME  4    ALPRAZolam (XANAX) 0.25 mg tablet Take 1.5 Tabs by mouth daily as needed for Anxiety. 45 Tab 1    desonide (TRIDESILON) 0.05 % topical lotion Apply  to affected area two (2) times daily as needed for Skin Irritation. 59 mL 0    mometasone (NASONEX) 50 mcg/actuation nasal spray 2 Sprays by Both Nostrils route as needed. 1 Container 11    diphenhydrAMINE (BENADRYL) 25 mg capsule Take 50 mg by mouth every six (6) hours as needed. Indications: ALLERGIC REACTIONS      cetirizine (ZYRTEC) 10 mg tablet Take 10 mg by mouth daily as needed.  promethazine-dextromethorphan (PROMETHAZINE-DM) 6.25-15 mg/5 mL syrup Take 5 mL by mouth every six (6) hours as needed for Cough. 240 mL 1       Allergies   Allergen Reactions    Other Food Other (comments)     PT STATES OTHER FOOD ALLERGIES WITH VARYING REACTIONS:  PORK, LETTUCE, LIMA BEANS, BLACK PEPPER, TEA, MALT, WALNUTS.  Beef Containing Products Anaphylaxis    Food Extracts Anaphylaxis     Beef, milk, peanuts    Milk Anaphylaxis    Peanut Anaphylaxis    Alpha-D-Galactosidase Diarrhea    Azithromycin Nausea and Vomiting    Erythromycin Nausea and Vomiting    Guaifenesin Nausea and Vomiting    Morphine Nausea and Vomiting     Projectile vomiting per patient    Other Medication Other (comments)     WHITE CELL STIMULATOR INJECTION (NEULASTIN?) CAUSED SEVERE BONE PAIN LASTING A COUPLE WEEKS.        Past Medical History:   Diagnosis Date    Anemia 10/3/13    \"SINCE I WAS A KID\"    Anxiety     DURING CHEMO; PT STAYS XANAX AS OF 10/3/13    Cancer ([facility] [id number]    breast,chemo ended 10/2012    Chronic pain     si joints sacrum    Depression     Nausea & vomiting     used patch in past with good results    Other ill-defined conditions(799.89)     anemia,chronic diarrhea, TMJ    Ovarian cyst     Palpitations     TAKES METOPROLOL FOR TACHYCARDIA    PUD (peptic ulcer disease)     occurred at age 33 .     Unspecified adverse effect of anesthesia     TMJ       Past Surgical History:   Procedure Laterality Date    BREAST SURGERY PROCEDURE UNLISTED      breast biopsy x3 left breast    HX BREAST RECONSTRUCTION  7/10/2013    BREAST NIPPLE RECONSTRUCTION REVISION performed by Clarence Bravo MD at [facility] MA[facility] OR    HX BREAST RECONSTRUCTION  2012    REINCISION LEFT BREAST FOR REMAINDER OF BREAST CA    HX BREAST RECONSTRUCTION  2012    R SEROMA EXCISED, REPLACE IMPLANT R BREAST    HX  SECTION  1984    HX ENDOSCOPY      [facility] HX GI      anal fissure repair    HX GYN  10/21/13    TLH/BSO    HX HYSTERECTOMY  10/2013    Dr. Noel Cruz    HX MASTECTOMY  2012    double    HX OTHER SURGICAL      DENTAL SURGERIES WITH SEDATION    HX VASCULAR ACCESS  2012    PORTACATH INSERTION    HX VASCULAR ACCESS  2012    PORTCATH REMOVAL    LAMINOTOMY  2010    [place] COLONOSCOPY FLX DX W/COLLJ SPEC WHEN PFRMD  2011    Dr Gloria Irving       Family History   Problem Relation Age of Onset    Breast Cancer Mother 64    Cancer Mother 64         OF LUNG CA    Hypertension Father     Diabetes Father     Emphysema Father     COPD Father     Heart Attack Sister 50    Heart Disease Sister     Breast Cancer Sister     No Known Problems Brother     Anxiety Brother     No Known Problems Sister     Breast Cancer Paternal Grandmother     Malignant Hyperthermia Neg Hx     Pseudocholinesterase Deficiency Neg Hx     Delayed Awakening Neg Hx     Post-op Nausea/Vomiting Neg Hx     Emergence Delirium Neg Hx     Post-op Cognitive Dysfunction Neg Hx     Other Neg Hx        Social History     Tobacco Use    Smoking status: Never Smoker    Smokeless tobacco: Never Used   Substance Use Topics    Alcohol use: No     Alcohol/week: 0.0 standard drinks   '     ROS    Physical Exam  Vitals signs and nursing note reviewed. Constitutional:       Appearance: Normal appearance. She is well-developed. Comments: /78 (BP 1 Location: Left arm, BP Patient Position: Sitting)   Pulse 66   Temp 97.5 °F (36.4 °C) (Oral)   Resp 20   Ht 5' (1.524 m)   Wt 203 lb (92.1 kg)   LMP 08/03/2012   SpO2 96%    L/min   BMI 39.65 kg/m²    HENT:      Right Ear: Tympanic membrane and ear canal normal.      Left Ear: Tympanic membrane and ear canal normal.      Nose: Mucosal edema and rhinorrhea present. Mouth/Throat:      Pharynx: Posterior oropharyngeal erythema present. No oropharyngeal exudate. Neck:      Musculoskeletal: Full passive range of motion without pain, normal range of motion and neck supple. No edema. Thyroid: No thyromegaly. Trachea: Trachea normal.   Cardiovascular:      Rate and Rhythm: Normal rate and regular rhythm. Pulmonary:      Effort: Pulmonary effort is normal.      Breath sounds: Wheezing (faint) present. No rales. Abdominal:      General: Bowel sounds are normal.      Palpations: Abdomen is soft. Tenderness: There is no abdominal tenderness. Lymphadenopathy:      Cervical: No cervical adenopathy. ASSESSMENT and PLAN  Diagnoses and all orders for this visit:    1. Bronchitis with bronchospasm  -     XR CHEST PA LAT; Future  -  No acute intrathoracic disease       predniSONE (DELTASONE) 10 mg tablet; Take 10 mg by mouth two (2) times a day for 5 days. -     promethazine-codeine (PHENERGAN with CODEINE) 6.25-10 mg/5 mL syrup; Take 5 mL by mouth every six (6) hours as needed for Cough for up to 30 days. Max Daily Amount: 20 mL. -     doxycycline (VIBRAMYCIN) 100 mg capsule; Take 1 Cap by mouth two (2) times a day for 10 days. 2. Malaise  -     AMB POC ADILIA INFLUENZA A/B TEST  -  Negative.             reviewed medications and side effects in detail    I have discussed diagnosis listed in this note with pt and/or family. I have discussed treatment plans and options and the risk/benefit analysis of those options, including safe use of medications and possible medication side effects. Through the use of shared decision making we have agreed to the above plan. The patient has received an after-visit summary and questions were answered concerning future plans and follow up. Advise pt of any urgent changes then to proceed to the ER.

## 2020-03-20 DIAGNOSIS — F41.9 ANXIETY: ICD-10-CM

## 2020-03-20 RX ORDER — ALPRAZOLAM 0.25 MG/1
0.38 TABLET ORAL
Qty: 45 TAB | Refills: 1 | OUTPATIENT
Start: 2020-03-20 | End: 2021-12-22 | Stop reason: SDUPTHER

## 2020-03-20 NOTE — TELEPHONE ENCOUNTER
[facility] sent a controlled substance request for patient's Xanax . Last visit:3/11/20  Next visit:4/10/20  Previous refill 12/14/18(45 + 1R)    Please check  before prescribing new Rx to patient. Thanks,  Heather    Requested Prescriptions     Pending Prescriptions Disp Refills    ALPRAZolam (XANAX) 0.25 mg tablet 45 Tab 1     Sig: Take 1.5 Tabs by mouth daily as needed for Anxiety.

## 2020-04-10 ENCOUNTER — VIRTUAL VISIT (OUTPATIENT)
Dept: FAMILY MEDICINE CLINIC | Age: 55
End: 2020-04-10

## 2020-04-10 DIAGNOSIS — J98.01 POST-INFECTION BRONCHOSPASM: Primary | ICD-10-CM

## 2020-04-10 DIAGNOSIS — F51.01 PRIMARY INSOMNIA: ICD-10-CM

## 2020-04-10 DIAGNOSIS — Z91.09 ENVIRONMENTAL ALLERGIES: ICD-10-CM

## 2020-04-10 RX ORDER — METOPROLOL SUCCINATE 25 MG/1
12.5 TABLET, EXTENDED RELEASE ORAL
COMMUNITY
End: 2020-09-18 | Stop reason: SDUPTHER

## 2020-04-10 RX ORDER — PREDNISONE 10 MG/1
TABLET ORAL
Qty: 21 TAB | Refills: 0 | Status: SHIPPED | OUTPATIENT
Start: 2020-04-10 | End: 2020-05-28 | Stop reason: ALTCHOICE

## 2020-04-10 RX ORDER — ZOLPIDEM TARTRATE 6.25 MG/1
6.25 TABLET, FILM COATED, EXTENDED RELEASE ORAL
Qty: 90 TAB | Refills: 0 | Status: SHIPPED | OUTPATIENT
Start: 2020-04-10 | End: 2020-07-21

## 2020-04-10 NOTE — PROGRESS NOTES
HISTORY OF PRESENT ILLNESS  Ermias Little is a 49 y.o. female. HPI   Patient encounter by synchronous (real-time) audio-video technology which is patient -initiated. Patient is aware that this encounter is a billable service with coverage as determined by her insurance carrier, all discussed at the time of check -in. Patient has given verbal consent to proceed. Seen on last month for cough and congestion. Completed the vibramycin and prednisone but cough has not gone away. C/o some nasal congestion but overall has been improved. Coughing up thick phlegm mostly in the mornings. Thinks that her allergies is now triggering some of the cough. No fever or chills noted. Her  who was sick on last month is better. He had travel on last month. No chest pain or SOB. Has had some intermittent wheezing. Increase cough at night. No known sick contacts or known exposure to [address] except that her  had travel on last month and was amongst a large crowd. Patient Active Problem List   Diagnosis Code    Palpitations R00.2    Depression F32.9    Environmental allergies Z91.09    GERD (gastroesophageal reflux disease) K21.9    Insomnia G47.00    Essential hypertension I10    ADD (attention deficit disorder) F98.8    Encounter for medication monitoring Z51.81    Severe obesity (BMI 35.0-39. 9) with comorbidity (HCC) E66.01       Current Outpatient Medications   Medication Sig Dispense Refill    metoprolol succinate (Toprol XL) 25 mg XL tablet Take 12.5 mg by mouth nightly.  zolpidem CR (AMBIEN CR) 6.25 mg tablet Take 1 Tab by mouth nightly as needed for Sleep. Max Daily Amount: 6.25 mg. 90 Tab 0    predniSONE (STERAPRED DS) 10 mg dose pack See administration instruction per 10mg dose pack 21 Tab 0    ALPRAZolam (XANAX) 0.25 mg tablet Take 1.5 Tabs by mouth daily as needed for Anxiety.  45 Tab 1    albuterol sulfate (ProAir RespiClick) 90 mcg/actuation aepb Take 2 Puffs by inhalation every four (4) hours as needed for Wheezing.  promethazine-codeine (PHENERGAN with CODEINE) 6.25-10 mg/5 mL syrup Take 5 mL by mouth every six (6) hours as needed for Cough for up to 30 days. Max Daily Amount: 20 mL. 180 mL 0    HYDROcodone-acetaminophen (NORCO) 5-325 mg per tablet Take 1 Tab by mouth every eight (8) hours as needed for Pain for up to 99 days. Max Daily Amount: 2 Tabs. 21 Tab 0    celecoxib (CELEBREX) 200 mg capsule Take 1 Cap by mouth two (2) times a day. 180 Cap 3    pantoprazole (PROTONIX) 40 mg tablet TAKE 1 TABLET BY MOUTH EVERY DAY 90 Tab 3    EPINEPHrine (EPIPEN) 0.3 mg/0.3 mL injection USE AS DIRECTED FOR ALLERGIC REACTION  1    PREVIDENT 5000 BOOSTER PLUS 1.1 % pste USE ONCE A DAY, PREFERABLY AT BEDTIME  4    desonide (TRIDESILON) 0.05 % topical lotion Apply  to affected area two (2) times daily as needed for Skin Irritation. 59 mL 0    mometasone (NASONEX) 50 mcg/actuation nasal spray 2 Sprays by Both Nostrils route as needed. (Patient taking differently: 2 Sprays by Both Nostrils route daily as needed.) 1 Container 11    diphenhydrAMINE (BENADRYL) 25 mg capsule Take 50 mg by mouth every six (6) hours as needed. Indications: ALLERGIC REACTIONS      cetirizine (ZYRTEC) 10 mg tablet Take 10 mg by mouth daily as needed. Allergies   Allergen Reactions    Other Food Other (comments)     PT STATES OTHER FOOD ALLERGIES WITH VARYING REACTIONS:  PORK, LETTUCE, LIMA BEANS, BLACK PEPPER, TEA, MALT, WALNUTS.     Beef Containing Products Anaphylaxis    Food Extracts Anaphylaxis     Beef, milk, peanuts    Milk Anaphylaxis    Peanut Anaphylaxis    Alpha-D-Galactosidase Diarrhea    Azithromycin Nausea and Vomiting    Erythromycin Nausea and Vomiting    Guaifenesin Nausea and Vomiting    Morphine Nausea and Vomiting     Projectile vomiting per patient    Other Medication Other (comments)     WHITE CELL STIMULATOR INJECTION (NEULASTIN?) CAUSED SEVERE BONE PAIN LASTING A COUPLE WEEKS. Past Medical History:   Diagnosis Date    Anemia 10/3/13    \"SINCE I WAS A KID\"    Anxiety     DURING CHEMO; PT STAYS XANAX AS OF 10/3/13    Cancer ([facility] [address] [id number]    breast,chemo ended 10/2012    Chronic pain     si joints sacrum    Depression     Nausea & vomiting     used patch in past with good results    Other ill-defined conditions(799.89)     anemia,chronic diarrhea, TMJ    Ovarian cyst     Palpitations     TAKES METOPROLOL FOR TACHYCARDIA    PUD (peptic ulcer disease)     occurred at age 33 .     Unspecified adverse effect of anesthesia     TMJ       Past Surgical History:   Procedure Laterality Date    BREAST SURGERY PROCEDURE UNLISTED      breast biopsy x3 left breast    HX BREAST RECONSTRUCTION  7/10/2013    BREAST NIPPLE RECONSTRUCTION REVISION performed by Win Castelan MD at [address] [place] [address] HX BREAST RECONSTRUCTION  2012    REINCISION LEFT BREAST FOR REMAINDER OF BREAST CA    HX BREAST RECONSTRUCTION  2012    R SEROMA EXCISED, REPLACE IMPLANT R BREAST    HX  SECTION      HX ENDOSCOPY      [facility] HX GI      anal fissure repair    HX GYN  10/21/13    TLH/BSO    HX HYSTERECTOMY  10/2013    Dr. Denis Novak    HX MASTECTOMY  2012    double    HX OTHER SURGICAL      DENTAL SURGERIES WITH SEDATION    HX VASCULAR ACCESS  2012    PORTACATH INSERTION    HX VASCULAR ACCESS  2012    PORTCATH REMOVAL    LAMINOTOMY  2010    [place] COLONOSCOPY FLX DX W/COLLJ SPEC WHEN PFRMD  2011    Dr Calderon Pereyra       Family History   Problem Relation Age of Onset   [facility] Breast Cancer Mother 64    Cancer Mother 64         OF LUNG CA    Hypertension Father     Diabetes Father     Emphysema Father     COPD Father     Heart Attack Sister 50    Heart Disease Sister     Breast Cancer Sister     No Known Problems Brother     Anxiety Brother     No Known Problems Sister     Breast Cancer Paternal Grandmother    [facility] Malignant Hyperthermia Neg Hx     Pseudocholinesterase Deficiency Neg Hx     Delayed Awakening Neg Hx     Post-op Nausea/Vomiting Neg Hx     Emergence Delirium Neg Hx     Post-op Cognitive Dysfunction Neg Hx     Other Neg Hx        Social History     Tobacco Use    Smoking status: Never Smoker    Smokeless tobacco: Never Used   Substance Use Topics    Alcohol use: No     Alcohol/week: 0.0 standard drinks          Review of Systems   Constitutional: Negative for chills, fever and malaise/fatigue. HENT: Positive for congestion. Negative for sinus pain. Eyes: Negative for blurred vision. Respiratory: Positive for cough, sputum production and wheezing. Negative for shortness of breath. Cardiovascular: Negative for chest pain, palpitations and leg swelling. Gastrointestinal: Negative for abdominal pain, constipation and heartburn. Genitourinary: Negative for dysuria, frequency and urgency. Musculoskeletal: Negative for back pain and joint pain. Neurological: Negative for dizziness, tingling and headaches. Endo/Heme/Allergies: Positive for environmental allergies. Psychiatric/Behavioral: Negative for depression. The patient does not have insomnia. Physical Exam  Constitutional:       Appearance: Normal appearance. Comments: Noted to have dry sounding cough during the visit   Pulmonary:      Effort: Pulmonary effort is normal.   Neurological:      Mental Status: She is alert. Psychiatric:         Mood and Affect: Mood normal.         Thought Content: Thought content normal.         ASSESSMENT and PLAN  Diagnoses and all orders for this visit:    1. Post-infection bronchospasm  -     predniSONE (STERAPRED DS) 10 mg dose pack; See administration instruction per 10mg dose pack  Continue with albuterol as needed. 2. Environmental allergies  Continue with her usual allergy meds. Preventative measure to reduce exposure to pollen allergies.           reviewed medications and side effects in detail    Discussed issues related to COVID-19. Pt was told that the best way to prevent illness is to avoid being exposed to this virus by cleaning  hands often using soap and water or using a hand  that contains at least 60% alcohol. Avoid touching your eyes, nose, and mouth. Avoid close contact with people who are sick. Put distance between yourself and other people of at least 6 feet. Throw used tissues in the trash immediately. Pt was told that currently there is no antiviral medication which is recommended to treat COVID-19. Current treatment is aimed to relieve sxs such as pain relievers and to avoid ibuprofen but to use acetaminophen, anti-cough medication, get plenty of rest and a lot of oral hydration. Take everyday precautions to keep space between yourself and others when you go out in public. Avoid crowds as much as possible. Avoid non-essential travel going only out to get groceries, seek medical care or to the pharmacy to pick prescriptions. Wear a facemask when going out. During a COVID-19 outbreak in your community or work, stay home as much as possible to further reduce your risk of being exposed. Pt was told that currently there is no antiviral medication which is recommended to treat COVID-19. Current treatment is aimed to relieve sxs such as pain relievers and to avoid ibuprofen but to use acetaminophen, anti-cough medication, get plenty of rest and a lot of oral hydration. Due to this being a TeleHealth encounter (During [address] emergency), evaluation of the following organ systems was limited: Vital/Constitutional/EENT/Resp/CV/GI//MS/Neuro/Skin/Heme-Lymph-Imm.   Pursuant to the emergency declaration under the [address] and [place] [address] waiver authority and the [employer] Act, this Virtual Visit was conducted, with patient's consent, to reduce the patient's risk of exposure to COVID-19 and provide continuity of care for an established patient. Services were provided through a video synchronous discussion virtually to substitute for in-person appointment. This visit was completed using doxy. me    Time in virtual visit: 8 minutes

## 2020-04-10 NOTE — PROGRESS NOTES
Chief Complaint   Patient presents with    Cough     patient states she still has a cough at times and coughing up a dark yellow sputum    Nasal Congestion    Fatigue     still c/o feeling tired     Patient denies any fever or chills. Patient states she has been using her inhaler more than usual.      1. Have you been to the ER, urgent care clinic since your last visit? Hospitalized since your last visit? No    2. Have you seen or consulted any other health care providers outside of the [address] [place] [address] since your last visit? Include any pap smears or colon screening.  No

## 2020-05-24 DIAGNOSIS — G89.29 CHRONIC MIDLINE LOW BACK PAIN WITHOUT SCIATICA: ICD-10-CM

## 2020-05-24 DIAGNOSIS — M47.816 LUMBAR ARTHROPATHY: ICD-10-CM

## 2020-05-24 DIAGNOSIS — G89.29 ENCOUNTER FOR CHRONIC PAIN MANAGEMENT: ICD-10-CM

## 2020-05-24 DIAGNOSIS — M54.50 CHRONIC MIDLINE LOW BACK PAIN WITHOUT SCIATICA: ICD-10-CM

## 2020-05-24 RX ORDER — HYDROCODONE BITARTRATE AND ACETAMINOPHEN 5; 325 MG/1; MG/1
1 TABLET ORAL
Qty: 21 TAB | Refills: 0 | Status: CANCELLED | OUTPATIENT
Start: 2020-05-24 | End: 2020-08-31

## 2020-05-28 ENCOUNTER — VIRTUAL VISIT (OUTPATIENT)
Dept: FAMILY MEDICINE CLINIC | Age: 55
End: 2020-05-28

## 2020-05-28 DIAGNOSIS — G89.29 ENCOUNTER FOR CHRONIC PAIN MANAGEMENT: Primary | ICD-10-CM

## 2020-05-28 DIAGNOSIS — G89.29 CHRONIC MIDLINE LOW BACK PAIN WITHOUT SCIATICA: ICD-10-CM

## 2020-05-28 DIAGNOSIS — M47.816 LUMBAR ARTHROPATHY: ICD-10-CM

## 2020-05-28 DIAGNOSIS — M54.50 CHRONIC MIDLINE LOW BACK PAIN WITHOUT SCIATICA: ICD-10-CM

## 2020-05-28 RX ORDER — HYDROCODONE BITARTRATE AND ACETAMINOPHEN 5; 325 MG/1; MG/1
1 TABLET ORAL
Qty: 30 TAB | Refills: 0 | Status: SHIPPED | OUTPATIENT
Start: 2020-05-28 | End: 2020-08-31 | Stop reason: SDUPTHER

## 2020-05-28 RX ORDER — MOMETASONE FUROATE 50 UG/1
2 SPRAY, METERED NASAL
COMMUNITY

## 2020-05-28 NOTE — PROGRESS NOTES
HISTORY OF PRESENT ILLNESS  Marlon Pires is a 49 y.o. female. HPI   Patient encounter by synchronous (real-time) audio-video technology which is patient-initiated. Patient is aware that this encounter is a billable service with coverage as determined by her insurance carrier, all discussed at the time of check-in. Patient has given verbal consent to proceed. Encounter for pain management. 1./2. Medical history/Past medical History  Chronic Pain:  Patient has osteoarthritis in the lower back and suffer with chronic pain in her lower back. She has been having increased pain at times in the mid right side of the back. Has been taking celebrex twice a day as long as she does not have GI upset. She uses the hydrocodone for more severe pain. Pain ranges from 6-9/10. MRI lumbar spine in 2009 showed DDD then with bulging disc and central radial teat in L4-5. She had laminectomy in 2010 for one disc. She has had multiple steroid epidural injections. She is still having some upper back pain that comes and goes. She did not get xray done from last visit. Symptoms onset: problem is longstanding. Rheumatological ROS: stable, mild-to-moderate joint symptoms intermittently, reasonably well controlled by PRN meds. Response to treatment plan: stable. 3. Applicable records from prior treatment providers are apart of [facility] under the media tab. 4. Diagnostic, therapeutic and laboratory results are available in the [facility] chart. 5. Consultation notes are available for review in the media tab of the [facility] chart. 6. Treatment goals include pain control so that the pt may be as active and function with her daily activities and improved comfort level. Previously pt has been limited by pain. 7. The risks and benefits of treatment has been discussed at this office visit with the pt.   She understands that the medication has addicting potential.  Additionally the pt has been advised that narcotic pain medication may impair mental and/or physical ability required for performance of tasks such as driving or operating any other machinery. 8. Pt has an updated signed pain contract on file thru 04/01/2020 and can be found under the FYI section of the [address] [place] [address] chart. Due to [facility] Albarran, pt was not able to be seen in the office to update her pain contract. Therefore new contract to update reviewed by nurse over the phone and copy will be mail to the pt to sign and mail back in.    9. The pain contract is reviewed. Pain medication will be continued at the previous dosage. Urine drug screening will not be done today. Diagnostic studies are not indicated at this time. Interventional procedure are not indicated at this time. 10. Medication prescibed is HYDROcodone-acetaminophen (NORCO) 5-325 mg per tablet .  has been reviewed at this OV by me. 11. Patient instructions have been reviewed in detail as outlined above and in the pain contract. 12. Re-eval is planned for 3 months. Patient Active Problem List   Diagnosis Code    Palpitations R00.2    Depression F32.9    Environmental allergies Z91.09    GERD (gastroesophageal reflux disease) K21.9    Insomnia G47.00    Essential hypertension I10    ADD (attention deficit disorder) F98.8    Encounter for medication monitoring Z51.81    Severe obesity (BMI 35.0-39. 9) with comorbidity (HCC) E66.01       Current Outpatient Medications   Medication Sig Dispense Refill    mometasone (NASONEX) 50 mcg/actuation nasal spray 2 Sprays by Both Nostrils route daily as needed.  metoprolol succinate (Toprol XL) 25 mg XL tablet Take 12.5 mg by mouth nightly.  zolpidem CR (AMBIEN CR) 6.25 mg tablet Take 1 Tab by mouth nightly as needed for Sleep. Max Daily Amount: 6.25 mg. 90 Tab 0    ALPRAZolam (XANAX) 0.25 mg tablet Take 1.5 Tabs by mouth daily as needed for Anxiety.  45 Tab 1    albuterol sulfate (ProAir RespiClick) 90 mcg/actuation aepb Take 2 Puffs by inhalation every four (4) hours as needed for Wheezing.  HYDROcodone-acetaminophen (NORCO) 5-325 mg per tablet Take 1 Tab by mouth every eight (8) hours as needed for Pain for up to 99 days. Max Daily Amount: 2 Tabs. 21 Tab 0    celecoxib (CELEBREX) 200 mg capsule Take 1 Cap by mouth two (2) times a day. 180 Cap 3    pantoprazole (PROTONIX) 40 mg tablet TAKE 1 TABLET BY MOUTH EVERY DAY 90 Tab 3    EPINEPHrine (EPIPEN) 0.3 mg/0.3 mL injection USE AS DIRECTED FOR ALLERGIC REACTION  1    PREVIDENT 5000 BOOSTER PLUS 1.1 % pste USE ONCE A DAY, PREFERABLY AT BEDTIME  4    desonide (TRIDESILON) 0.05 % topical lotion Apply  to affected area two (2) times daily as needed for Skin Irritation. 59 mL 0    diphenhydrAMINE (BENADRYL) 25 mg capsule Take 50 mg by mouth every six (6) hours as needed. Indications: ALLERGIC REACTIONS      cetirizine (ZYRTEC) 10 mg tablet Take 10 mg by mouth daily as needed. Allergies   Allergen Reactions    Other Food Other (comments)     PT STATES OTHER FOOD ALLERGIES WITH VARYING REACTIONS:  PORK, LETTUCE, LIMA BEANS, BLACK PEPPER, TEA, MALT, WALNUTS.  Beef Containing Products Anaphylaxis    Food Extracts Anaphylaxis     Beef, milk, peanuts    Milk Anaphylaxis    Peanut Anaphylaxis    Alpha-D-Galactosidase Diarrhea    Azithromycin Nausea and Vomiting    Erythromycin Nausea and Vomiting    Guaifenesin Nausea and Vomiting    Morphine Nausea and Vomiting     Projectile vomiting per patient    Other Medication Other (comments)     WHITE CELL STIMULATOR INJECTION (NEULASTIN?) CAUSED SEVERE BONE PAIN LASTING A COUPLE WEEKS.        Past Medical History:   Diagnosis Date    Anemia 10/3/13    \"SINCE I WAS A KID\"    Anxiety     DURING CHEMO; PT STAYS XANAX AS OF 10/3/13    Cancer ([facility] [id number]    breast,chemo ended 10/2012    Chronic pain     si joints sacrum    Depression     Nausea & vomiting     used patch in past with good results  Other ill-defined conditions(799.89)     anemia,chronic diarrhea, TMJ    Ovarian cyst     Palpitations     TAKES METOPROLOL FOR TACHYCARDIA    PUD (peptic ulcer disease)     occurred at age 33 .     Unspecified adverse effect of anesthesia     TMJ       Past Surgical History:   Procedure Laterality Date    BREAST SURGERY PROCEDURE UNLISTED      breast biopsy x3 left breast    HX BREAST RECONSTRUCTION  7/10/2013    BREAST NIPPLE RECONSTRUCTION REVISION [facility] Carolann Rey [facility] MA[facility]    HX BREAST RECONSTRUCTION  2012    REINCISION LEFT BREAST FOR REMAINDER OF BREAST CA    HX BREAST RECONSTRUCTION  2012    R SEROMA EXCISED, REPLACE IMPLANT R BREAST    HX  SECTION      HX ENDOSCOPY      [facility] HX GI      anal fissure repair    HX GYN  10/21/13    TLH/BSO    HX HYSTERECTOMY  10/2013    Dr. Gideon Lozada    HX MASTECTOMY  2012    double    HX OTHER SURGICAL      DENTAL SURGERIES WITH SEDATION    HX VASCULAR ACCESS  2012    PORTACATH INSERTION    HX VASCULAR ACCESS  2012    PORTCATH REMOVAL    LAMINOTOMY  2010    [place] COLONOSCOPY FLX DX W/COLLJ SPEC WHEN PFRMD  2011    Dr Diaz Dopp       Family History   Problem Relation Age of Onset    Breast Cancer Mother 64    Cancer Mother 64         OF LUNG CA    Hypertension Father     Diabetes Father     Emphysema Father     COPD Father     Heart Attack Sister 50    Heart Disease Sister     Breast Cancer Sister     No Known Problems Brother     Anxiety Brother     No Known Problems Sister     Breast Cancer Paternal Grandmother     Malignant Hyperthermia Neg Hx     Pseudocholinesterase Deficiency Neg Hx     Delayed Awakening Neg Hx     Post-op Nausea/Vomiting Neg Hx     Emergence Delirium Neg Hx     Post-op Cognitive Dysfunction Neg Hx     Other Neg Hx        Social History     Tobacco Use    Smoking status: Never Smoker    Smokeless tobacco: Never Used   Substance Use Topics    Alcohol use: No     Alcohol/week: 0.0 standard drinks        Lab Results   Component Value Date/Time    WBC 7.7 01/03/2020 04:26 PM    HGB 13.4 01/03/2020 04:26 PM    HCT 39.0 01/03/2020 04:26 PM    PLATELET 978 64/51/3832 04:26 PM    MCV 77 (L) 01/03/2020 04:26 PM     Lab Results   Component Value Date/Time    Cholesterol, total 224 (H) 01/03/2020 04:26 PM    HDL Cholesterol 40 01/03/2020 04:26 PM    LDL, calculated 156 (H) 01/03/2020 04:26 PM    LDL-C, External 139 05/21/2015 01:33 PM    Triglyceride 138 01/03/2020 04:26 PM    CHOL/HDL Ratio 3.6 12/15/2009 02:10 PM     Lab Results   Component Value Date/Time    TSH 2.610 06/28/2019 11:53 AM    T4, Free 1.00 06/28/2019 11:53 AM      Lab Results   Component Value Date/Time    Sodium 140 01/03/2020 04:26 PM    Potassium 4.3 01/03/2020 04:26 PM    Chloride 103 01/03/2020 04:26 PM    CO2 20 01/03/2020 04:26 PM    Anion gap 7 04/08/2019 09:06 PM    Glucose 118 (H) 01/03/2020 04:26 PM    BUN 10 01/03/2020 04:26 PM    Creatinine 0.74 01/03/2020 04:26 PM    BUN/Creatinine ratio 14 01/03/2020 04:26 PM    GFR est  01/03/2020 04:26 PM    GFR est non-AA 93 01/03/2020 04:26 PM    Calcium 9.7 01/03/2020 04:26 PM    Bilirubin, total 0.4 01/03/2020 04:26 PM    ALT (SGPT) 43 (H) 01/03/2020 04:26 PM    Alk. phosphatase 105 01/03/2020 04:26 PM    Protein, total 7.3 01/03/2020 04:26 PM    Albumin 4.7 01/03/2020 04:26 PM    Globulin 3.8 04/08/2019 09:06 PM    A-G Ratio 1.8 01/03/2020 04:26 PM      Lab Results   Component Value Date/Time    Hemoglobin A1c 5.9 (H) 01/03/2020 04:26 PM    Hemoglobin A1c (POC) 6.0 04/22/2019 09:50 AM    Hemoglobin A1c, External 5.7 05/21/2015 01:33 PM         Review of Systems   Constitutional: Negative for malaise/fatigue. HENT: Negative for congestion. Eyes: Negative for blurred vision. Respiratory: Negative for cough and shortness of breath. Cardiovascular: Negative for chest pain, palpitations and leg swelling. Gastrointestinal: Negative for abdominal pain, constipation and heartburn. Genitourinary: Negative for dysuria, frequency and urgency. Musculoskeletal: Negative for back pain and joint pain. Neurological: Negative for dizziness, tingling and headaches. Endo/Heme/Allergies: Negative for environmental allergies. Psychiatric/Behavioral: Negative for depression. The patient does not have insomnia. Physical Exam  Constitutional:       Appearance: Normal appearance. Pulmonary:      Effort: Pulmonary effort is normal.   Neurological:      Mental Status: She is alert. Psychiatric:         Mood and Affect: Mood normal.         Thought Content: Thought content normal.         ASSESSMENT and PLAN  Diagnoses and all orders for this visit:    1. Encounter for chronic pain management//  2. Chronic midline low back pain without sciatica  3. Lumbar arthropathy  -     Refill HYDROcodone-acetaminophen (NORCO) 5-325 mg per tablet; Take 1 Tab by mouth every eight (8) hours as needed for Pain for up to 99 days. Max Daily Amount: 2 Tabs. reviewed medications and side effects in detail     Due to this being a TeleHealth encounter (During [address] emergency), evaluation of the following organ systems was limited: Vital/Constitutional/EENT/Resp/CV/GI//MS/Neuro/Skin/Heme-Lymph-Imm. Pursuant to the emergency declaration under the [employer] and [facility] [place] waiver authority and the [employer] Act, this Virtual Visit was conducted, with patient's consent, to reduce the patient's risk of exposure to COVID-19 and provide continuity of care for an established patient. Services were provided through a video synchronous discussion virtually to substitute for in-person appointment. This visit was completed using doxy. me    Time in virtual visit: 10 minutes

## 2020-05-28 NOTE — LETTER
Name:Mark BISWAS Wahkon :1965 MR #:[id number] Provider Hugo Toledo MD  
*XRWQ-169* BSMG-491 () Page 1 of 5 Initial [employer] CONTROLLED SUBSTANCE AGREEMENT I may be prescribed medications that are controlled substances as part  of my treatment plan for management of my medical condition(s). The goal of my treatment plan is to maintain and/or improve my health and wellbeing. Because controlled substances have an increased risk of abuse or harm, continual re-evaluation is needed determine if the goals of my treatment plan are being met for my safety and the safety of others. Tatiana Shepherd  am entering into this Controlled Substance Agreement with my provider, Ashley Buchanan MD at [facility] . I understand that successful treatment requires mutual trust and honesty between me and my provider. I understand that there are state and federal laws and regulations which apply to the medications that my provider may prescribe that must be followed. I understand there are risks and benefits ts of taking the medicines that my provider may prescribe. I understand and agree that following this Agreement is necessary in continuing my provider-patient relationship and success of my treatment plan. As a part of my treatment plan, I agree to the following: COMMUNICATION: 
 
1. I will communicate fully with my provider about my medical condition(s), including the effect on my daily life and how well my medications are helping. I will tell my provider all of the medications that I take for any reason, including medications I receive from another health care provider, and will notify my provider about all issues, problems or concerns, including any side effects, which may be related to my medications. I understand that this information allows my provider to adjust my treatment plan to help manage my medical condition.  I understand that this information will become part of my permanent medical record. 2. I will notify my provider if I have a history of alcohol/drug misuse/addiction or if I have had treatment for alcohol/drug addiction in the past, or if I have a new problem with or concern about alcohol/drug use/addiction, because this increases the likelihood of high risk behaviors and may lead to serious medical conditions. 3. Females Only: I will notify my provider if I am or become pregnant, or if I intend to become pregnant, or if I intend to breastfeed. I understand that communication of these issues with my provider is important, due to possible effects my medication could have on an unborn fetus or breastfeeding child. Name:. Marlon Pires :1965 MR #:[id number] Provider Andrew Brunson MD  
*LFKQ-665* BSMG-491 () Page 2 of 5 Initial [employer] MISUSE OF MEDICATIONS / DRUGS: 
 
1. I agree to take all controlled substances as prescribed, and will not misuse or abuse any controlled substances prescribed by my provider. For my safety, I will not increase the amount of medicine I take without first talking with and getting permission from my provider. 2. If I have a medical emergency, another health care provider may prescribe me medication. If I seek emergency treatment, I will notify my provider within seventy-two (72) hours. 3. I understand that my provider may discuss my use and/or possible misuse/abuse of controlled substances and alcohol, as appropriate, with any health care provider involved in my care, pharmacist or legal authority. ILLEGAL DRUGS: 
 
1. I will not use illegal drugs of any kind, including but not limited to marijuana, heroin, cocaine, or any prescription drug which is not prescribed to me. DRUG DIVERSION / PRESCRIPTION FRAUD: 
 
1. I will not share, sell, trade, give away, or otherwise misuse my prescriptions or medications. 2. I will not alter any prescriptions provided to me by my provider. SINGLE PROVIDER: 
 
1. I agree that all controlled substances that I take will be prescribed only by my provider (or his/her covering provider) under this Agreement. This agreement does not prevent me from seeking emergency medical treatment or receiving pain management related to a surgery. PROTECTING MEDICATIONS: 
 
1. I am responsible for keeping my prescriptions and medications in a safe and secure place including safeguarding them from loss or theft. I understand that lost, stolen or damaged/destroyed prescriptions or medications will not be replaced. Name:Hernandez Banks :1965 MR #:[id number] Provider Allegra Dos Santos MD  
*DPSF-081* BSMG-491 () Page 3 of 5 Initial [employer] PRESCRIPTION RENEWALS/REFILLS: 
 
1. I will follow my controlled substance medication schedule as prescribed by my provider. 2. I understand and agree that I will make any requests for renewals or refills of my prescriptions only at the time of an office visit or during my providers regular office hours subject to the prescription refill requirements of the individual practice. 3. I understand that my provider may not call in prescriptions for controlled substances to my pharmacy. 4. I understand that my provider may adjust or discontinue these medications as deemed appropriate for my medical treatment plan. This Agreement does not guarantee the prescription of controlled medications. 5. I agree that if my medications are adjusted or discontinued, I will properly dispose of any remaining medications. I understand that I will be required to dispose of any remaining controlled medications prior to being provided with any prescriptions for other controlled medications.  
 
 
1. I authorize my provider and my pharmacy to cooperate fully with any local, state, or federal law enforcement agency in the investigation of any possible misuse, sale, or other diversion of my controlled substance prescriptions or medications. RISKS: 
 
 
1. I understand that if I do not adhere to this Agreement in any way, my provider may change my prescriptions, stop prescribing controlled substances or end our provider-patient relationship. 2. If my provider decides to stop prescribing medication, or decides to end our provider-patient relationship,my provider may require that I taper my medications slowly. If necessary, my provider may also provide a prescription for other medications to treat my withdrawal symptoms. UNDERSTANDING THIS AGREEMENT: 
 
I understand that my provider may adjust or stop my prescriptions for controlled substances based on my medical condition and my treatment plan. I understand that this Agreement does not guarantee that I will be prescribed medications or controlled substances. I understand that controlled substances may be just one part 
of my treatment plan. My initial on each page and my signature below shows that I have read each page of this Agreement, I have had an opportunity to ask questions, and all of my questions have been answered to my satisfaction by my provider. By signing below, I agree to comply with this Agreement, and I understand that if I do not follow the Agreements listed above, my provider may stop 
 
 
 
_________________________________________  Date/Time 5/28/2020 10:10 AM   
             (Patient Signature)

## 2020-05-28 NOTE — PROGRESS NOTES
Chief Complaint   Patient presents with    Medication Management     follow up     Informed patient will be mailing pain contract due to the COVID 19 and to please sign and please mail back in self addressed envelope and mail back as soon as possible. Patient verbalized understanding. Pain contract placed in mail. 1. Have you been to the ER, urgent care clinic since your last visit? Hospitalized since your last visit? No    2. Have you seen or consulted any other health care providers outside of the [address] [place] [address] since your last visit? Include any pap smears or colon screening.  No

## 2020-07-21 DIAGNOSIS — F51.01 PRIMARY INSOMNIA: ICD-10-CM

## 2020-07-21 RX ORDER — ZOLPIDEM TARTRATE 6.25 MG/1
TABLET, FILM COATED, EXTENDED RELEASE ORAL
Qty: 90 TAB | Refills: 0 | Status: SHIPPED | OUTPATIENT
Start: 2020-07-21 | End: 2020-10-21

## 2020-08-31 DIAGNOSIS — G89.29 CHRONIC MIDLINE LOW BACK PAIN WITHOUT SCIATICA: ICD-10-CM

## 2020-08-31 DIAGNOSIS — G89.29 ENCOUNTER FOR CHRONIC PAIN MANAGEMENT: ICD-10-CM

## 2020-08-31 DIAGNOSIS — M47.816 LUMBAR ARTHROPATHY: ICD-10-CM

## 2020-08-31 DIAGNOSIS — M54.50 CHRONIC MIDLINE LOW BACK PAIN WITHOUT SCIATICA: ICD-10-CM

## 2020-08-31 RX ORDER — NITROFURANTOIN (MACROCRYSTALS) 100 MG/1
100 CAPSULE ORAL 2 TIMES DAILY
Qty: 10 CAP | Refills: 0 | Status: SHIPPED | OUTPATIENT
Start: 2020-08-31 | End: 2020-09-05

## 2020-08-31 RX ORDER — HYDROCODONE BITARTRATE AND ACETAMINOPHEN 5; 325 MG/1; MG/1
1 TABLET ORAL
Qty: 30 TAB | Refills: 0 | Status: SHIPPED | OUTPATIENT
Start: 2020-08-31 | End: 2020-10-21 | Stop reason: SDUPTHER

## 2020-08-31 NOTE — TELEPHONE ENCOUNTER
Pt calling about discomfort with urination and blood in urine since this morning Pls advise Best number to reach her is [phone]

## 2020-09-18 DIAGNOSIS — K21.9 GASTROESOPHAGEAL REFLUX DISEASE WITHOUT ESOPHAGITIS: ICD-10-CM

## 2020-09-18 RX ORDER — PANTOPRAZOLE SODIUM 40 MG/1
TABLET, DELAYED RELEASE ORAL
Qty: 90 TAB | Refills: 3 | Status: SHIPPED | OUTPATIENT
Start: 2020-09-18 | End: 2021-09-27

## 2020-09-18 RX ORDER — METOPROLOL SUCCINATE 25 MG/1
12.5 TABLET, EXTENDED RELEASE ORAL
Qty: 45 TAB | Refills: 3 | Status: SHIPPED | OUTPATIENT
Start: 2020-09-18 | End: 2021-09-27

## 2020-09-18 NOTE — TELEPHONE ENCOUNTER
Last visit:5/28/20(virtual visit)  Next visit: not scheduled  Previous refill 4/10/20(no quantity or refills listed)    Requested Prescriptions     Pending Prescriptions Disp Refills    metoprolol succinate (Toprol XL) 25 mg XL tablet 45 Tab 3     Sig: Take 0.5 Tabs by mouth nightly.

## 2020-10-21 DIAGNOSIS — M54.50 CHRONIC MIDLINE LOW BACK PAIN WITHOUT SCIATICA: ICD-10-CM

## 2020-10-21 DIAGNOSIS — F51.01 PRIMARY INSOMNIA: ICD-10-CM

## 2020-10-21 DIAGNOSIS — G89.29 ENCOUNTER FOR CHRONIC PAIN MANAGEMENT: ICD-10-CM

## 2020-10-21 DIAGNOSIS — G89.29 CHRONIC MIDLINE LOW BACK PAIN WITHOUT SCIATICA: ICD-10-CM

## 2020-10-21 DIAGNOSIS — M47.816 LUMBAR ARTHROPATHY: ICD-10-CM

## 2020-10-21 RX ORDER — ZOLPIDEM TARTRATE 6.25 MG/1
TABLET, FILM COATED, EXTENDED RELEASE ORAL
Qty: 90 TAB | Refills: 0 | Status: SHIPPED | OUTPATIENT
Start: 2020-10-21 | End: 2021-01-19

## 2020-10-27 RX ORDER — HYDROCODONE BITARTRATE AND ACETAMINOPHEN 5; 325 MG/1; MG/1
1 TABLET ORAL
Qty: 30 TAB | Refills: 0 | Status: SHIPPED | OUTPATIENT
Start: 2020-10-27 | End: 2021-02-03

## 2020-10-27 RX ORDER — ZOLPIDEM TARTRATE 6.25 MG/1
6.25 TABLET, FILM COATED, EXTENDED RELEASE ORAL
Qty: 90 TAB | Refills: 0 | Status: SHIPPED | OUTPATIENT
Start: 2020-10-27 | End: 2021-04-19 | Stop reason: SDUPTHER

## 2020-12-21 DIAGNOSIS — G89.29 CHRONIC MIDLINE LOW BACK PAIN WITHOUT SCIATICA: ICD-10-CM

## 2020-12-21 DIAGNOSIS — M54.50 CHRONIC MIDLINE LOW BACK PAIN WITHOUT SCIATICA: ICD-10-CM

## 2020-12-21 RX ORDER — CELECOXIB 200 MG/1
200 CAPSULE ORAL 2 TIMES DAILY
Qty: 180 CAP | Refills: 3 | Status: SHIPPED | OUTPATIENT
Start: 2020-12-21 | End: 2021-12-29

## 2020-12-21 NOTE — TELEPHONE ENCOUNTER
Last visit:5/28/20(virtual)  Next visit:not scheduled  Previous refill 11/22/19(180+3R)    Requested Prescriptions     Pending Prescriptions Disp Refills    celecoxib (CELEBREX) 200 mg capsule 180 Cap 3     Sig: Take 1 Cap by mouth two (2) times a day.

## 2021-01-17 DIAGNOSIS — F51.01 PRIMARY INSOMNIA: ICD-10-CM

## 2021-01-17 RX ORDER — ZOLPIDEM TARTRATE 6.25 MG/1
6.25 TABLET, FILM COATED, EXTENDED RELEASE ORAL
Qty: 90 TAB | Refills: 0 | Status: CANCELLED | OUTPATIENT
Start: 2021-01-17

## 2021-01-19 DIAGNOSIS — F51.01 PRIMARY INSOMNIA: ICD-10-CM

## 2021-01-19 RX ORDER — ZOLPIDEM TARTRATE 6.25 MG/1
TABLET, FILM COATED, EXTENDED RELEASE ORAL
Qty: 90 TAB | Refills: 0 | Status: SHIPPED | OUTPATIENT
Start: 2021-01-19 | End: 2021-02-01 | Stop reason: SDUPTHER

## 2021-02-01 ENCOUNTER — OFFICE VISIT (OUTPATIENT)
Dept: FAMILY MEDICINE CLINIC | Age: 56
End: 2021-02-01
Payer: COMMERCIAL

## 2021-02-01 VITALS
DIASTOLIC BLOOD PRESSURE: 87 MMHG | BODY MASS INDEX: 40.68 KG/M2 | HEIGHT: 60 IN | HEART RATE: 82 BPM | TEMPERATURE: 97.7 F | OXYGEN SATURATION: 97 % | RESPIRATION RATE: 18 BRPM | SYSTOLIC BLOOD PRESSURE: 138 MMHG | WEIGHT: 207.2 LBS

## 2021-02-01 DIAGNOSIS — R73.02 IGT (IMPAIRED GLUCOSE TOLERANCE): ICD-10-CM

## 2021-02-01 DIAGNOSIS — M47.816 LUMBAR ARTHROPATHY: ICD-10-CM

## 2021-02-01 DIAGNOSIS — G89.29 CHRONIC MID BACK PAIN: ICD-10-CM

## 2021-02-01 DIAGNOSIS — G89.29 ENCOUNTER FOR CHRONIC PAIN MANAGEMENT: ICD-10-CM

## 2021-02-01 DIAGNOSIS — Z51.81 ENCOUNTER FOR MEDICATION MONITORING: ICD-10-CM

## 2021-02-01 DIAGNOSIS — I10 ESSENTIAL HYPERTENSION: Primary | ICD-10-CM

## 2021-02-01 DIAGNOSIS — F43.22 ADJUSTMENT DISORDER WITH ANXIETY: ICD-10-CM

## 2021-02-01 DIAGNOSIS — M54.9 CHRONIC MID BACK PAIN: ICD-10-CM

## 2021-02-01 DIAGNOSIS — M54.50 CHRONIC MIDLINE LOW BACK PAIN WITHOUT SCIATICA: ICD-10-CM

## 2021-02-01 DIAGNOSIS — F41.9 ANXIETY: ICD-10-CM

## 2021-02-01 DIAGNOSIS — G89.29 CHRONIC MIDLINE LOW BACK PAIN WITHOUT SCIATICA: ICD-10-CM

## 2021-02-01 DIAGNOSIS — E55.9 HYPOVITAMINOSIS D: ICD-10-CM

## 2021-02-01 PROCEDURE — 99214 OFFICE O/P EST MOD 30 MIN: CPT | Performed by: FAMILY MEDICINE

## 2021-02-01 RX ORDER — VENLAFAXINE HYDROCHLORIDE 37.5 MG/1
37.5 CAPSULE, EXTENDED RELEASE ORAL DAILY
Qty: 30 CAP | Refills: 3 | Status: SHIPPED | OUTPATIENT
Start: 2021-02-01 | End: 2021-02-01 | Stop reason: SDUPTHER

## 2021-02-01 RX ORDER — VENLAFAXINE HYDROCHLORIDE 37.5 MG/1
CAPSULE, EXTENDED RELEASE ORAL
Qty: 60 CAP | Refills: 3 | Status: SHIPPED | OUTPATIENT
Start: 2021-02-01 | End: 2021-07-12 | Stop reason: SDUPTHER

## 2021-02-01 RX ORDER — EPINEPHRINE 0.3 MG/.3ML
INJECTION SUBCUTANEOUS
Qty: 2 SYRINGE | Refills: 1 | Status: SHIPPED | OUTPATIENT
Start: 2021-02-01

## 2021-02-01 NOTE — PROGRESS NOTES
HISTORY OF PRESENT ILLNESS  Marie Crawford is a 55 y.o. female.  Follow up on chronic medical problems.  Doing the precautionary measures at home to reduce risks of exposure COVID19.  Also wearing mask when she is going out.  No known sick contacts or known exposure to COVID19.     Cardiovascular Review:  The patient has hypertension.  Diet and Lifestyle: generally follows a low fat low cholesterol diet, generally follows a low sodium diet, exercises sporadically  Home BP Monitoring: is not measured at home.  Pertinent ROS: taking medications as instructed, no medication side effects noted, no TIA's, no chest pain on exertion, no dyspnea on exertion, no swelling of ankles.  Anxiety Review:  Patient is seen for ADD, sleep disturbance and anxiety. Current treatment includes Xanax.   Increased anxiety related to her finding out that her job will end in May.  Has been having difficulty concentrating.  Seem like she has trouble remembering also.  Sleep is not restful. Has been feeling more depressed related to the job issues.  No SI/HI    Ongoing symptoms include: insomnia.  She is using ambien for sleep.    Reported side effects from the treatment: none.     Encounter for pain management.  1./2. Medical history/Past medical History  Chronic Pain:  Patient has osteoarthritis in the lower back and suffer with chronic pain in her lower back.  She has been having increased pain at times in the mid right side of the back.  Has been taking celebrex twice a day as long as she does not have GI upset.  She uses the hydrocodone for more severe pain.  Pain ranges from 6-9/10.  MRI lumbar spine in 2009 showed DDD then with bulging disc and central radial teat in L4-5.  She had laminectomy in 2010 for one disc.  She has had multiple steroid epidural injections.  She is still having some upper back pain that comes and goes.  She did not get xray done from last visit.          Symptoms onset: problem is  Refilled per MD standing orders. longstanding. Rheumatological ROS: stable, mild-to-moderate joint symptoms intermittently, reasonably well controlled by PRN meds. Response to treatment plan: stable. 3. Applic[facility]. 4. Diagnostic, therapeutic and laboratory results are available in the [facility] chart. 5. Consultation notes are available for review in the media tab of the [facility] chart. 6. Treatment goals include pain control so that the pt may be as active and function with her daily activities and improved comfort level. Previously pt has been limited by pain. 7. The risks and benefits of treatment has been discussed at this office visit with the pt. She understands that the medication has addicting potential.  Additionally the pt has been advised that narcotic pain medication may impair mental and/or physical ability required for performance of tasks such as driving or operating any other machinery. 8. Pt has an updated signed pain contract on file thru 04/01/2020 and can be found under the FYI section of the [facility] chart. Due to [place], pt was not able to be seen in the office to update her pain contract. Therefore new contract to update reviewed by nurse over the phone and copy will be mail to the pt to sign and mail back in.    9. The pain contract is reviewed. Pain medication will be continued at the previous dosage. Urine drug screening will not be done today. Diagnostic studies are not indicated at this time. Interventional procedure are not indicated at this time. 10. Medication prescibed is HYDROcodone-acetaminophen (NORCO) 5-325 mg per tablet .  has been reviewed at this OV by me. 11. Patient instructions have been reviewed in detail as outlined above and in the pain contract. 12. Re-eval is planned for 3 months.      Patient Active Problem List   Diagnosis Code    Palpitations R00.2    Depression F32.9  Environmental allergies Z91.09    GERD (gastroesophageal reflux disease) K21.9    Insomnia G47.00    Essential hypertension I10    ADD (attention deficit disorder) F98.8    Encounter for medication monitoring Z51.81    Severe obesity (BMI 35.0-39. 9) with comorbidity (HCC) E66.01       Current Outpatient Medications   Medication Sig Dispense Refill    EPINEPHrine (EPIPEN) 0.3 mg/0.3 mL injection USE AS DIRECTED FOR ALLERGIC REACTION 2 Syringe 1    celecoxib (CELEBREX) 200 mg capsule Take 1 Cap by mouth two (2) times a day. 180 Cap 3    zolpidem CR (AMBIEN CR) 6.25 mg tablet Take 1 Tab by mouth nightly as needed for Sleep. Max Daily Amount: 6.25 mg. 90 Tab 0    HYDROcodone-acetaminophen (NORCO) 5-325 mg per tablet Take 1 Tab by mouth every eight (8) hours as needed for Pain for up to 99 days. Max Daily Amount: 2 Tabs. 30 Tab 0    metoprolol succinate (Toprol XL) 25 mg XL tablet Take 0.5 Tabs by mouth nightly. 45 Tab 3    pantoprazole (PROTONIX) 40 mg tablet TAKE 1 TABLET BY MOUTH EVERY DAY 90 Tab 3    mometasone (NASONEX) 50 mcg/actuation nasal spray 2 Sprays by Both Nostrils route daily as needed.  ALPRAZolam (XANAX) 0.25 mg tablet Take 1.5 Tabs by mouth daily as needed for Anxiety. 45 Tab 1    albuterol sulfate (ProAir RespiClick) 90 mcg/actuation aepb Take 2 Puffs by inhalation every four (4) hours as needed for Wheezing.  desonide (TRIDESILON) 0.05 % topical lotion Apply  to affected area two (2) times daily as needed for Skin Irritation. 59 mL 0    diphenhydrAMINE (BENADRYL) 25 mg capsule Take 50 mg by mouth every six (6) hours as needed. Indications: ALLERGIC REACTIONS      cetirizine (ZYRTEC) 10 mg tablet Take 10 mg by mouth daily as needed.       PREVIDENT 5000 BOOSTER PLUS 1.1 % pste USE ONCE A DAY, PREFERABLY AT BEDTIME  4       Allergies   Allergen Reactions    Other Food Other (comments)     PT STATES OTHER FOOD ALLERGIES WITH VARYING REACTIONS:  PORK, LETTUCE, LIMA BEANS, BLACK PEPPER, TEA, MALT, WALNUTS.  Beef Containing Products Anaphylaxis    Food Extracts Anaphylaxis     Beef, milk, peanuts    Milk Anaphylaxis    Peanut Anaphylaxis    Alpha-D-Galactosidase Diarrhea    Azithromycin Nausea and Vomiting    Erythromycin Nausea and Vomiting    Guaifenesin Nausea and Vomiting    Morphine Nausea and Vomiting     Projectile vomiting per patient    Other Medication Other (comments)     WHITE CELL STIMULATOR INJECTION (NEULASTIN?) CAUSED SEVERE BONE PAIN LASTING A COUPLE WEEKS. Past Medical History:   Diagnosis Date    Anemia 10/3/13    \"SINCE I WAS A KID\"    Anxiety     DURING CHEMO; PT STAYS XANAX AS OF 10/3/13    [facility] [id number]    breast,chemo ended 10/2012    Chronic pain     si joints sacrum    Depression     Nausea & vomiting     used patch in past with good results    Other ill-defined conditions(799.89)     anemia,chronic diarrhea, TMJ    Ovarian cyst     Palpitations     TAKES METOPROLOL FOR TACHYCARDIA    PUD (peptic ulcer disease)     occurred at age 33 .     Unspecified adverse effect of anesthesia     TMJ         Past Surgical History:   Procedure Laterality Date    HX BREAST RECONSTRUCTION  7/10/2013    BREAST NIPPLE RECONSTRUCTION REVISION performed by Nacho Bethea MD at [address] [place] [address] HX BREAST RECONSTRUCTION  2012    REINCISION LEFT BREAST FOR REMAINDER OF BREAST CA    HX BREAST RECONSTRUCTION  2012    R SEROMA EXCISED, REPLACE IMPLANT R BREAST    HX  SECTION      HX ENDOSCOPY      [facility] HX GI      anal fissure repair    HX GYN  10/21/13    TLH/BSO    HX HYSTERECTOMY  10/2013    Dr. Jaky Avila    HX MASTECTOMY  2012    double    HX OTHER SURGICAL      DENTAL SURGERIES WITH SEDATION    HX VASCULAR ACCESS  2012    PORTACATH INSERTION    HX VASCULAR ACCESS  2012    PORTCATH REMOVAL    OK BREAST SURGERY PROCEDURE UNLISTED      breast biopsy x3 left breast    OK COLONOSCOPY FLX DX W/COLLJ SPEC WHEN PFRMD  2011    Dr Andre Saldana    [place] LAMINOTOMY  2010         Family History   Problem Relation Age of Onset    Breast Cancer Mother 64    Cancer Mother 64         OF LUNG CA    Hypertension Father     Diabetes Father     Emphysema Father     COPD Father     Heart Attack Sister 50    Heart Disease Sister     Breast Cancer Sister     No Known Problems Brother     Anxiety Brother     No Known Problems Sister     Breast Cancer Paternal Grandmother     Malignant Hyperthermia Neg Hx     Pseudocholinesterase Deficiency Neg Hx     Delayed Awakening Neg Hx     Post-op Nausea/Vomiting Neg Hx     Emergence Delirium Neg Hx     Post-op Cognitive Dysfunction Neg Hx     Other Neg Hx        Social History     Tobacco Use    Smoking status: Never Smoker    Smokeless tobacco: Never Used   Substance Use Topics    Alcohol use: No     Alcohol/week: 0.0 standard drinks        Lab Results   Component Value Date/Time    WBC 7.7 2020 04:26 PM    HGB 13.4 2020 04:26 PM    HCT 39.0 2020 04:26 PM    PLATELET 058  04:26 PM    MCV 77 (L) 2020 04:26 PM     Lab Results   Component Value Date/Time    Cholesterol, total 224 (H) 2020 04:26 PM    HDL Cholesterol 40 2020 04:26 PM    LDL, calculated 156 (H) 2020 04:26 PM    LDL-C, External 139 2015 01:33 PM    Triglyceride 138 2020 04:26 PM    CHOL/HDL Ratio 3.6 12/15/2009 02:10 PM     Lab Results   Component Value Date/Time    TSH 2.610 2019 11:53 AM    T4, Free 1.00 2019 11:53 AM      Lab Results   Component Value Date/Time    Sodium 140 2020 04:26 PM    Potassium 4.3 2020 04:26 PM    Chloride 103 2020 04:26 PM    CO2 20 2020 04:26 PM    Anion gap 7 2019 09:06 PM    Glucose 118 (H) 2020 04:26 PM    BUN 10 2020 04:26 PM    Creatinine 0.74 2020 04:26 PM    BUN/Creatinine ratio 14 2020 04:26 PM    GFR est  01/03/2020 04:26 PM    GFR est non-AA 93 01/03/2020 04:26 PM    Calcium 9.7 01/03/2020 04:26 PM    Bilirubin, total 0.4 01/03/2020 04:26 PM    ALT (SGPT) 43 (H) 01/03/2020 04:26 PM    Alk. phosphatase 105 01/03/2020 04:26 PM    Protein, total 7.3 01/03/2020 04:26 PM    Albumin 4.7 01/03/2020 04:26 PM    Globulin 3.8 04/08/2019 09:06 PM    A-G Ratio 1.8 01/03/2020 04:26 PM      Lab Results   Component Value Date/Time    Hemoglobin A1c 5.9 (H) 01/03/2020 04:26 PM    Hemoglobin A1c (POC) 6.0 04/22/2019 09:50 AM    Hemoglobin A1c, External 5.7 05/21/2015 01:33 PM         Review of Systems   Constitutional: Positive for malaise/fatigue. HENT: Negative for congestion. Eyes: Negative for blurred vision. Respiratory: Negative for cough and shortness of breath. Cardiovascular: Negative for chest pain, palpitations and leg swelling. Gastrointestinal: Negative for abdominal pain, constipation and heartburn. Genitourinary: Negative for dysuria, frequency and urgency. Musculoskeletal: Positive for back pain. Neurological: Negative for dizziness, tingling and headaches. Endo/Heme/Allergies: Negative for environmental allergies. Psychiatric/Behavioral: Positive for memory loss. Negative for depression. The patient is nervous/anxious and has insomnia. Physical Exam  Vitals signs and nursing note reviewed. Constitutional:       Appearance: Normal appearance. She is well-developed. Comments: /87 (BP 1 Location: Left upper arm, BP Patient Position: Sitting)   Pulse 82   Temp 97.7 °F (36.5 °C) (Temporal)   Resp 18   Ht 5' (1.524 m)   Wt 207 lb 3.2 oz (94 kg)   LMP 08/03/2012   SpO2 97%   BMI 40.47 kg/m²    HENT:      Right Ear: Tympanic membrane and ear canal normal.      Left Ear: Tympanic membrane and ear canal normal.      Nose: No mucosal edema or rhinorrhea. Neck:      Musculoskeletal: Normal range of motion and neck supple. Thyroid: No thyromegaly.    Cardiovascular: Rate and Rhythm: Normal rate and regular rhythm. Heart sounds: Normal heart sounds. No gallop. Pulmonary:      Effort: Pulmonary effort is normal.      Breath sounds: Normal breath sounds. Abdominal:      General: Bowel sounds are normal.      Palpations: Abdomen is soft. There is no mass. Tenderness: There is no abdominal tenderness. Musculoskeletal: Normal range of motion. General: No swelling. Thoracic back: She exhibits tenderness and bony tenderness. She exhibits normal range of motion. Lumbar back: She exhibits tenderness and bony tenderness. She exhibits normal range of motion. Lymphadenopathy:      Cervical: No cervical adenopathy. Skin:     General: Skin is warm and dry. Neurological:      General: No focal deficit present. Mental Status: She is alert and oriented to person, place, and time. Psychiatric:         Mood and Affect: Mood normal.       ASSESSMENT and PLAN  Diagnoses and all orders for this visit:    1. Essential hypertension  Discussed sodium restriction, high k rich diet, maintaining ideal body weight and regular exercise program such as daily walking 30 min perday 4-5 times per week, as physiologic means to achieve blood pressure control. Medication compliance advised. -     LIPID PANEL; Future    2. IGT (impaired glucose tolerance)  -     HEMOGLOBIN A1C WITH EAG; Future    3. Anxiety  -     venlafaxine-SR (EFFEXOR-XR) 37.5 mg capsule; Take 1 tablet daily for the first week and then increased to 2 tablets daily.  -     TSH 3RD GENERATION; Future    4. Adjustment disorder with anxiety  -     venlafaxine-SR (EFFEXOR-XR) 37.5 mg capsule; Take 1 tablet daily for the first week and then increased to 2 tablets daily. 5. Chronic mid back pain  -     XR SPINE THORAC 3 V; Future   -WNL    6. Chronic midline low back pain without sciatica//  7. Lumbar arthropathy  8.  Encounter for chronic pain management  -     MONITOR SCREEN 10-DRUG CLASS PROFILE  The pt has signed medication agreement. Pain contract is reviewed. Pain medications will be continued at the previous dosage. Urine drug screening will be done today. Diagnostic  studies are not indicated at this time. Interventional procedure are not indicated at this time. Re-eval in 3 months. 9. Hypovitaminosis D  -     VITAMIN D, 25 HYDROXY; Future    10. Encounter for medication monitoring  -     CBC W/O DIFF; Future  -     METABOLIC PANEL, COMPREHENSIVE; Future    Other orders  -     EPINEPHrine (EPIPEN) 0.3 mg/0.3 mL injection; USE AS DIRECTED FOR ALLERGIC REACTION      Follow-up and Dispositions    · Return in about 6 weeks (around 3/15/2021). reviewed diet, exercise and weight control  cardiovascular risk and specific lipid/LDL goals reviewed  reviewed medications and side effects in detail  specific diabetic recommendations: low cholesterol diet, weight control and daily exercise discussed and glycohemoglobin and other lab monitoring discussed     I have discussed diagnosis listed in this note with pt and/or family. I have discussed treatment plans and options and the risk/benefit analysis of those options, including safe use of medications and possible medication side effects. Through the use of shared decision making we have agreed to the above plan. The patient has received an after-visit summary and questions were answered concerning future plans and follow up. Advise pt of any urgent changes then to proceed to the ER.

## 2021-02-01 NOTE — PROGRESS NOTES
Chief Complaint   Patient presents with    Memory Loss     c/o having memory issues    Anxiety    Leg Swelling     c/o bilateral leg swelling         Pain contract up to date. Due 6/10/2021. Colonoscopy 9/14/2011 by Dr. Gloria Aparicio repeat in 10 years      1. Have you been to the ER, urgent care clinic since your last visit? Hospitalized since your last visit? No    2. Have you seen or consulted any other health care providers outside of the [address] [place] [address] since your last visit? Include any pap smears or colon screening.  No

## 2021-02-02 LAB
AMPHETAMINES UR QL SCN: NEGATIVE NG/ML
BARBITURATES UR QL SCN: NEGATIVE NG/ML
BENZODIAZ UR QL SCN: NEGATIVE NG/ML
BZE UR QL SCN: NEGATIVE NG/ML
CANNABINOIDS UR QL SCN: NEGATIVE NG/ML
CREAT UR-MCNC: 78.9 MG/DL (ref 20–300)
METHADONE UR QL SCN: NEGATIVE NG/ML
OPIATES UR QL SCN: NEGATIVE NG/ML
OXYCODONE+OXYMORPHONE UR QL SCN: NEGATIVE NG/ML
PCP UR QL: NEGATIVE NG/ML
PH UR: 7.2 [PH] (ref 4.5–8.9)
PLEASE NOTE:, 733163: NORMAL
PROPOXYPH UR QL SCN: NEGATIVE NG/ML

## 2021-02-03 RX ORDER — VENLAFAXINE HYDROCHLORIDE 75 MG/1
75 CAPSULE, EXTENDED RELEASE ORAL DAILY
Qty: 30 CAP | Refills: 3 | Status: SHIPPED | OUTPATIENT
Start: 2021-02-03 | End: 2021-07-12

## 2021-02-03 NOTE — TELEPHONE ENCOUNTER
Insurance will only cover a max of 1/day on Venlafaxine. Please use the higher strength in order for insurance to cover after the initial 7 days.

## 2021-03-22 ENCOUNTER — VIRTUAL VISIT (OUTPATIENT)
Dept: FAMILY MEDICINE CLINIC | Age: 56
End: 2021-03-22
Payer: COMMERCIAL

## 2021-03-22 DIAGNOSIS — F43.22 ADJUSTMENT DISORDER WITH ANXIETY: Primary | ICD-10-CM

## 2021-03-22 PROCEDURE — 99212 OFFICE O/P EST SF 10 MIN: CPT | Performed by: FAMILY MEDICINE

## 2021-03-22 RX ORDER — HYDROCODONE BITARTRATE AND ACETAMINOPHEN 5; 325 MG/1; MG/1
1 TABLET ORAL
COMMUNITY
End: 2021-08-16 | Stop reason: ALTCHOICE

## 2021-03-22 NOTE — PROGRESS NOTES
Chief Complaint   Patient presents with    Anxiety     7 week anxiety    Memory Loss     7 week follow up     Patient states her anxiety is a little better. Patient reports she has not seen a change with her memory and still forgetting things and \"using post notes everywhere\". Pain contract up to date. Due 6/10/2021. Colonoscopy 9/14/2011 by Dr. Aydee Christina repeat in 10 years      1. Have you been to the ER, urgent care clinic since your last visit? Hospitalized since your last visit? No    2. Have you seen or consulted any other health care providers outside of the [address] [place] [address] since your last visit? Include any pap smears or colon screening.  No

## 2021-03-22 NOTE — PROGRESS NOTES
HISTORY OF PRESENT ILLNESS  Carmela Campos is a 48 y.o. female. HPI   Follow up on anxiety. Overall the anxiety is better. Thinks that the effexor has helped. Has also settle situation with her job ending and feeling relieved that she will be getting a severance pay. Sleeping well at night. 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 Thinks that this will help also to reduce the stress. Denies feeling depressed. No SI/HI. She is actually looking forward to having some time off for the summer. Anxiety Review:  Patient is seen for ADD, sleep disturbance and anxiety. Has been having difficulty concentrating but this has improved. .  Ongoing symptoms include: insomnia. She is using [place] for sleep. Has not had to use the xanax. Reported side effects from the treatment: none. Patient Active Problem List   Diagnosis Code    Palpitations R00.2    Depression F32.9    Environmental allergies Z91.09    GERD (gastroesophageal reflux disease) K21.9    Insomnia G47.00    Essential hypertension I10    ADD (attention deficit disorder) F98.8    Encounter for medication monitoring Z51.81    Severe obesity (BMI 35.0-39. 9) with comorbidity ([facility]) E66.01       Current Outpatient Medications   Medication Sig Dispense Refill    HYDROcodone-acetaminophen (NORCO) 5-325 mg per tablet Take 1 Tab by mouth every six (6) hours as needed for Pain.  venlafaxine-SR (EFFEXOR-XR) 75 mg capsule Take 1 Cap by mouth daily. 30 Cap 3    EPINEPHrine (EPIPEN) 0.3 mg/0.3 mL injection USE AS DIRECTED FOR ALLERGIC REACTION 2 Syringe 1    celecoxib (CELEBREX) 200 mg capsule Take 1 Cap by mouth two (2) times a day. 180 Cap 3    zolpidem CR (AMBIEN CR) 6.25 mg tablet Take 1 Tab by mouth nightly as needed for Sleep. Max Daily Amount: 6.25 mg. 90 Tab 0    metoprolol succinate (Toprol XL) 25 mg XL tablet Take 0.5 Tabs by mouth nightly.  45 Tab 3    pantoprazole (PROTONIX) 40 mg tablet TAKE 1 TABLET BY MOUTH EVERY DAY 90 Tab 3    mometasone (NASONEX) 50 mcg/actuation nasal spray 2 Sprays by Both Nostrils route daily as needed.  ALPRAZolam (XANAX) 0.25 mg tablet Take 1.5 Tabs by mouth daily as needed for Anxiety. 45 Tab 1    albuterol sulfate (ProAir RespiClick) 90 mcg/actuation aepb Take 2 Puffs by inhalation every four (4) hours as needed for Wheezing.  PREVIDENT 5000 BOOSTER PLUS 1.1 % pste USE ONCE A DAY, PREFERABLY AT BEDTIME  4    desonide (TRIDESILON) 0.05 % topical lotion Apply  to affected area two (2) times daily as needed for Skin Irritation. 59 mL 0    diphenhydrAMINE (BENADRYL) 25 mg capsule Take 50 mg by mouth every six (6) hours as needed. Indications: ALLERGIC REACTIONS      cetirizine (ZYRTEC) 10 mg tablet Take 10 mg by mouth daily as needed.  venlafaxine-SR (EFFEXOR-XR) 37.5 mg capsule Take 1 tablet daily for the first week and then increased to 2 tablets daily. 60 Cap 3       Allergies   Allergen Reactions    Other Food Other (comments)     PT STATES OTHER FOOD ALLERGIES WITH VARYING REACTIONS:  PORK, LETTUCE, LIMA BEANS, BLACK PEPPER, TEA, MALT, WALNUTS.  Beef Containing Products Anaphylaxis    Food Extracts Anaphylaxis     Beef, milk, peanuts    Milk Anaphylaxis    Peanut Anaphylaxis    Alpha-D-Galactosidase Diarrhea    Azithromycin Nausea and Vomiting    Erythromycin Nausea and Vomiting    Guaifenesin Nausea and Vomiting    Morphine Nausea and Vomiting     Projectile vomiting per patient    Other Medication Other (comments)     WHITE CELL STIMULATOR INJECTION (NEULASTIN?) CAUSED SEVERE BONE PAIN LASTING A COUPLE WEEKS.        Past Medical History:   Diagnosis Date    Anemia 10/3/13    \"SINCE I WAS A KID\"    Anxiety     DURING CHEMO; PT STAYS XANAX AS OF 10/3/13    Cancer ([facility] [id number]    breast,chemo ended 10/2012    Chronic pain     si joints sacrum    Depression     Nausea & vomiting     used patch in past with good results    Other ill-defined conditions(799.89)     anemia,chronic diarrhea, TMJ    Ovarian cyst     Palpitations     TAKES METOPROLOL FOR TACHYCARDIA    PUD (peptic ulcer disease)     occurred at age 33 .     Unspecified adverse effect of anesthesia     TMJ       Past Surgical History:   Procedure Laterality Date    HX BREAST RECONSTRUCTION  7/10/2013    BREAST NIPPLE RECONSTRUCTION REVISION performed by Chela Kerr [facility] at [facility] MAIN OR    HX BREAST RECONSTRUCTION  2012    REINCISION LEFT BREAST FOR REMAINDER OF BREAST CA    HX BREAST RECONSTRUCTION  2012    R SEROMA EXCISED, REPLACE IMPLANT R BREAST    HX  SECTION      HX ENDOSCOPY      [facility] HX GI      anal fissure repair    HX GYN  10/21/13    TLH/BSO    HX HYSTERECTOMY  10/2013    Dr. Regulo Toth    HX MASTECTOMY  2012    double    HX OTHER SURGICAL      DENTAL SURGERIES WITH SEDATION    HX VASCULAR ACCESS  2012    PORTACATH INSERTION    HX VASCULAR ACCESS  2012    PORTCATH REMOVAL    OR BREAST SURGERY PROCEDURE UNLISTED      breast biopsy x3 left breast    OR COLONOSCOPY FLX DX W/COLLJ SPEC WHEN PFRMD  2011    Dr Sagrario Kent    OR LAMINOTOMY  2010       Family History   Problem Relation Age of Onset    Breast Cancer Mother 64    Cancer Mother 64         OF LUNG CA    Hypertension Father     Diabetes Father     Emphysema Father     COPD Father     Heart Attack Sister 50    Heart Disease Sister     Breast Cancer Sister     No Known Problems Brother     Anxiety Brother     No Known Problems Sister     Breast Cancer Paternal Grandmother     Malignant Hyperthermia Neg Hx     Pseudocholinesterase Deficiency Neg Hx     Delayed Awakening Neg Hx     Post-op Nausea/Vomiting Neg Hx     Emergence Delirium Neg Hx     Post-op Cognitive Dysfunction Neg Hx     Other Neg Hx        Social History     Tobacco Use    Smoking status: Never Smoker    Smokeless tobacco: Never Used   Substance Use Topics    Alcohol use: No     Alcohol/week: 0.0 standard drinks        ROS    Physical Exam  Constitutional:       Appearance: Normal appearance. Pulmonary:      Effort: Pulmonary effort is normal.   Neurological:      Mental Status: She is alert. Psychiatric:         Mood and Affect: Mood normal.         Thought Content: Thought content normal.         ASSESSMENT and PLAN  Diagnoses and all orders for this visit:    1. Adjustment disorder with anxiety  Improved. Continue with current regimen. Due to this being a TeleHealth encounter (During [address] emergency), evaluation of the following organ systems was limited: Vital/Constitutional/EENT/Resp/CV/GI//MS/Neuro/Skin/Heme-Lymph-Imm. Pursuant to the emergency declaration under the [address] and [facility] waiver authority and the [employer] Act, this Virtual Visit was conducted, with patient's consent, to reduce the patient's risk of exposure to COVID-19 and provide continuity of care for an established patient. Services were provided through a video synchronous discussion virtually to substitute for in-person appointment. This visit was completed using [email or web address] me    Time in virtual visit: 12 minutes

## 2021-03-30 ENCOUNTER — NURSE TRIAGE (OUTPATIENT)
Dept: OTHER | Facility: CLINIC | Age: 56
End: 2021-03-30

## 2021-03-30 ENCOUNTER — TELEPHONE (OUTPATIENT)
Dept: FAMILY MEDICINE CLINIC | Age: 56
End: 2021-03-30

## 2021-03-30 NOTE — TELEPHONE ENCOUNTER
----- Message from Sofia Merchant sent at 3/30/2021  4:13 PM EDT -----  Regarding: Dr. Fadia Goins / Ellie Mendoza first and last name: N/A      Reason for call: Pt informed she is experiencing a sore throat, slight cough, significant drainage and temperature of 97.5  (which is slightly elevated for her). Pt is looking for a call back from the nurse to give further guidance. Callback required yes/no and why: yes, to give further guidance. Best contact number(s): [phone]      Details to clarify the request: Pt was transferred to [facility] nurse triage for cough and elevated personal temperature.       Sofia Merchant

## 2021-03-30 NOTE — TELEPHONE ENCOUNTER
Patient called [facility] with red flag complaint. Hard transfer call. Brief description of triage: Patient called in to report a sore throat 9/10 and other possible COVID related symptoms, onset of symptoms 3/27/21, see triage assessment below. Triage indicates for patient to call PCP now. Care advice provided, patient verbalizes understanding; denies any other questions or concerns; instructed to call back for any new or worsening symptoms. Writer provided warm transfer to [facility], at [facility] for appointment scheduling. Reason for Disposition   [1] HIGH RISK patient (e.g., age > 59 years, diabetes, heart or lung disease, weak immune system) AND [2] new or worsening symptoms    Answer Assessment - Initial Assessment Questions  1. COVID-19 DIAGNOSIS: \"Who made your Coronavirus (COVID-19) diagnosis? \" \"Was it confirmed by a positive lab test?\" If not diagnosed by a HCP, ask \"Are there lots of cases (community spread) where you live? \" (See public health department website, if unsure)     Patient has not been tested    2. COVID-19 EXPOSURE: \"Was there any known exposure to COVID before the symptoms began? \" CDC Definition of close contact: within 6 feet (2 meters) for a total of 15 minutes or more over a 24-hour period. Denies    3. ONSET: \"When did the COVID-19 symptoms start? \"      3/27/21    4. WORST SYMPTOM: \"What is your worst symptom? \" (e.g., cough, fever, shortness of breath, muscle aches)      Sore throat 9/10    5. COUGH: \"Do you have a cough? \" If so, ask: \"How bad is the cough? \"        Intermittent, mild cough due to post nasal drip    6. FEVER: \"Do you have a fever? \" If so, ask: \"What is your temperature, how was it measured, and when did it start? \"      Denies    7. RESPIRATORY STATUS: \"Describe your breathing? \" (e.g., shortness of breath, wheezing, unable to speak)      Normal    8. BETTER-SAME-WORSE: [place] you getting better, staying the same or getting worse compared to yesterday? \"  If getting worse, ask, \"In what way? \"     Worse - sore throat is worse    9. HIGH RISK DISEASE: \"Do you have any chronic medical problems? \" (e.g., asthma, heart or lung disease, weak immune system, obesity, etc.)      Seasonal asthma, hx of breast CA, obesity    10. PREGNANCY: \"Is there any chance you are pregnant? \" \"When was your last menstrual period? \"        NA    11. OTHER SYMPTOMS: \"Do you have any other symptoms? \"  (e.g., chills, fatigue, headache, loss of smell or taste, muscle pain, sore throat; new loss of smell or taste especially support the diagnosis of COVID-19)        Loss of taste (since 2005), runny nose, and sore throat 9/10    Protocols used: CORONAVIRUS (COVID-19) [facility]    Attention Provider: Thank you for allowing me to participate in the care of your patient. The patient was connected to triage from [facility]. Please do not respond through this encounter as the response is not directed to a shared pool.

## 2021-03-31 ENCOUNTER — TELEPHONE (OUTPATIENT)
Dept: FAMILY MEDICINE CLINIC | Age: 56
End: 2021-03-31

## 2021-04-01 NOTE — TELEPHONE ENCOUNTER
Left message for patient to go to Urgent Care, [facility] due to Covid pandemic office unable to see for current symptoms.

## 2021-04-07 ENCOUNTER — OFFICE VISIT (OUTPATIENT)
Dept: URGENT CARE | Age: 56
End: 2021-04-07
Payer: COMMERCIAL

## 2021-04-07 ENCOUNTER — OFFICE VISIT (OUTPATIENT)
Dept: FAMILY MEDICINE CLINIC | Age: 56
End: 2021-04-07

## 2021-04-07 VITALS — RESPIRATION RATE: 16 BRPM | OXYGEN SATURATION: 97 % | HEART RATE: 79 BPM | TEMPERATURE: 98.3 F

## 2021-04-07 DIAGNOSIS — R09.82 POST-NASAL DRIP: ICD-10-CM

## 2021-04-07 DIAGNOSIS — F43.22 ADJUSTMENT DISORDER WITH ANXIETY: Primary | ICD-10-CM

## 2021-04-07 DIAGNOSIS — E55.9 HYPOVITAMINOSIS D: ICD-10-CM

## 2021-04-07 DIAGNOSIS — F41.9 ANXIETY: ICD-10-CM

## 2021-04-07 DIAGNOSIS — I10 ESSENTIAL HYPERTENSION: ICD-10-CM

## 2021-04-07 DIAGNOSIS — J01.90 ACUTE SINUSITIS, RECURRENCE NOT SPECIFIED, UNSPECIFIED LOCATION: Primary | ICD-10-CM

## 2021-04-07 DIAGNOSIS — Z20.822 CONTACT WITH AND (SUSPECTED) EXPOSURE TO COVID-19: ICD-10-CM

## 2021-04-07 DIAGNOSIS — Z51.81 ENCOUNTER FOR MEDICATION MONITORING: ICD-10-CM

## 2021-04-07 DIAGNOSIS — R73.02 IGT (IMPAIRED GLUCOSE TOLERANCE): ICD-10-CM

## 2021-04-07 DIAGNOSIS — J02.9 SORE THROAT: ICD-10-CM

## 2021-04-07 PROCEDURE — 99202 OFFICE O/P NEW SF 15 MIN: CPT | Performed by: NURSE PRACTITIONER

## 2021-04-07 RX ORDER — DOXYCYCLINE 100 MG/1
100 TABLET ORAL 2 TIMES DAILY
Qty: 14 TAB | Refills: 0 | Status: SHIPPED | OUTPATIENT
Start: 2021-04-07 | End: 2021-04-14

## 2021-04-07 NOTE — PROGRESS NOTES
Subjective: (As above and below)       This patient was seen in [facility] Clinic at [address] [place] [address] Urgent Care while outdoors at their vehicle due to COVID-19 pandemic with PPE and focused examination in order to decrease community viral transmission. The patient/guardian gave verbal consent to treat. Chief Complaint   Patient presents with    Sore Throat     1 week ago had sorethroats, swollen glands. resolved had doxycycline at home and took for 4 days. was instructed by  pcp today to be seen at urgent care. Kristen Dash is a 48 y.o. female who presents for evaluation referred by PCP office. She promotes today having past month of \"chronic sinus issues\" that were not improving. Last week she noted she was having worse sore throat and post nasal drip. She started an old prescription of doxycycline she had and today is reporting it has \"completely improved\" all of her symptoms. Here today as she was worried the 4 days of antibiotics she took due to it running out was not enough. She denies any concern for covid and declines testing. 0/10 throat pain. No other URI symptoms. Recent travel: no  Known Exposure to COVID-19: YES  Known flu or strep contact: no     Review of Systems - negative except as listed above    Reviewed PmHx, RxHx, FmHx, SocHx, AllgHx and updated in chart.   Family History   Problem Relation Age of Onset    Breast Cancer Mother 64    Cancer Mother 64         OF LUNG CA    Hypertension Father     Diabetes Father     Emphysema Father     COPD Father     Heart Attack Sister 50    Heart Disease Sister     Breast Cancer Sister     No Known Problems Brother     Anxiety Brother     No Known Problems Sister     Breast Cancer Paternal Grandmother     Malignant Hyperthermia Neg Hx     Pseudocholinesterase Deficiency Neg Hx     Delayed Awakening Neg Hx     Post-op Nausea/Vomiting Neg Hx     Emergence Delirium Neg Hx     Post-op Cognitive Dysfunction Neg Hx     Other Neg Hx        Past Medical History:   Diagnosis Date    Anemia 10/3/13    \"SINCE I WAS A KID\"    Anxiety     DURING CHEMO; PT STAYS XANAX AS OF 10/3/13    Cancer ([facility] [id number]    breast,chemo ended 10/2012    Chronic pain     si joints sacrum    Depression     Nausea & vomiting     used patch in past with good results    Other ill-defined conditions(799.89)     anemia,chronic diarrhea, TMJ    Ovarian cyst     Palpitations     TAKES METOPROLOL FOR TACHYCARDIA    PUD (peptic ulcer disease)     occurred at age 33 .  Unspecified adverse effect of anesthesia     TMJ      Social History     Socioeconomic History    Marital status: SINGLE     Spouse name: Not on file    Number of children: Not on file    Years of education: Not on file    Highest education level: Not on file   Tobacco Use    Smoking status: Never Smoker    Smokeless tobacco: Never Used   Substance and Sexual Activity    Alcohol use: No     Alcohol/week: 0.0 standard drinks    Drug use: No    Sexual activity: Yes     Partners: Male          Current Outpatient Medications   Medication Sig    doxycycline (ADOXA) 100 mg tablet Take 1 Tab by mouth two (2) times a day for 7 days.  HYDROcodone-acetaminophen (NORCO) 5-325 mg per tablet Take 1 Tab by mouth every six (6) hours as needed for Pain.  venlafaxine-SR (EFFEXOR-XR) 75 mg capsule Take 1 Cap by mouth daily.  EPINEPHrine (EPIPEN) 0.3 mg/0.3 mL injection USE AS DIRECTED FOR ALLERGIC REACTION    venlafaxine-SR (EFFEXOR-XR) 37.5 mg capsule Take 1 tablet daily for the first week and then increased to 2 tablets daily.  celecoxib (CELEBREX) 200 mg capsule Take 1 Cap by mouth two (2) times a day.  zolpidem CR (AMBIEN CR) 6.25 mg tablet Take 1 Tab by mouth nightly as needed for Sleep. Max Daily Amount: 6.25 mg.    metoprolol succinate (Toprol XL) 25 mg XL tablet Take 0.5 Tabs by mouth nightly.     pantoprazole (PROTONIX) 40 mg tablet TAKE 1 TABLET BY MOUTH EVERY DAY    mometasone (NASONEX) 50 mcg/actuation nasal spray 2 Sprays by Both Nostrils route daily as needed.  ALPRAZolam (XANAX) 0.25 mg tablet Take 1.5 Tabs by mouth daily as needed for Anxiety.  albuterol sulfate (ProAir RespiClick) 90 mcg/actuation aepb Take 2 Puffs by inhalation every four (4) hours as needed for Wheezing.  PREVIDENT 5000 BOOSTER PLUS 1.1 % pste USE ONCE A DAY, PREFERABLY AT BEDTIME    desonide (TRIDESILON) 0.05 % topical lotion Apply  to affected area two (2) times daily as needed for Skin Irritation.  diphenhydrAMINE (BENADRYL) 25 mg capsule Take 50 mg by mouth every six (6) hours as needed. Indications: ALLERGIC REACTIONS    cetirizine (ZYRTEC) 10 mg tablet Take 10 mg by mouth daily as needed. No current facility-administered medications for this visit. Objective:     Vitals:    04/07/21 1527   Pulse: 79   Resp: 16   Temp: 98.3 °F (36.8 °C)   SpO2: 97%       Physical Exam  General appearance  appears well hydrated and does not appear toxic, no acute distress  Eyes - EOMs intact. Non injected. No scleral icterus   Ears - no external swelling  Nose - nasal congestion. No purulent drainage  Mouth - OP without swelling or exudate. Mucus membranes moist. Uvula midline. Neck/Lymphatics  trachea midline, full AROM, no LAD  Chest - Normal breathing effort no wheeze rales, rhonchi or diminishments bilaterally. Heart - RRR, no murmurs  Skin - no observable rashes or pallor  Neurologic- alert and oriented x 3  Psychiatric- normal mood, behavior and though content. Assessment/ Plan:     1. Acute sinusitis, recurrence not specified, unspecified location    - doxycycline (ADOXA) 100 mg tablet; Take 1 Tab by mouth two (2) times a day for 7 days. Dispense: 14 Tab; Refill: 0    2. Sore throat      3. Post-nasal drip    Will extend out course of doxycycline as she has only taken 4 days; this has helped resolve her symptoms.  Suspect possible sinus infection with post nasal drip as cause of her sore throat. Advised to follow up with PCP within next 1-2 weeks for any return or unresolved symptoms. Has declined covid 19 test today. Follow up: We have reviewed worsening/concerning signs and symptoms that may warrant seeking immediate care in the ED setting. For other non-severe changes, non-improvement or questions, patient aware to contact PCP office or consider a virtual online medical consultation.         Deb Salazar NP

## 2021-04-07 NOTE — PROGRESS NOTES
HISTORY OF PRESENT ILLNESS  Lorrie Barillas is a 48 y.o. female. HPI   Follow up on anxiety. Overall the anxiety is better. Thinks that the effexor has helped. Has also settle situation with her job ending and feeling relieved that she will be getting a severance pay. Sleeping well at night. 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 Thinks that this will help also to reduce the stress. Denies feeling depressed. No SI/HI. She is actually looking forward to having some time off for the summer. Anxiety Review:  Patient is seen for ADD, sleep disturbance and anxiety. Has been having difficulty concentrating but this has improved. .  Ongoing symptoms include: insomnia. She is using [place] for sleep. Has not had to use the xanax. Reported side effects from the treatment: none. Patient Active Problem List   Diagnosis Code    Palpitations R00.2    Depression F32.9    Environmental allergies Z91.09    GERD (gastroesophageal reflux disease) K21.9    Insomnia G47.00    Essential hypertension I10    ADD (attention deficit disorder) F98.8    Encounter for medication monitoring Z51.81    Severe obesity (BMI 35.0-39. 9) with comorbidity ([facility]) E66.01       Current Outpatient Medications   Medication Sig Dispense Refill    HYDROcodone-acetaminophen (NORCO) 5-325 mg per tablet Take 1 Tab by mouth every six (6) hours as needed for Pain.  venlafaxine-SR (EFFEXOR-XR) 75 mg capsule Take 1 Cap by mouth daily. 30 Cap 3    EPINEPHrine (EPIPEN) 0.3 mg/0.3 mL injection USE AS DIRECTED FOR ALLERGIC REACTION 2 Syringe 1    venlafaxine-SR (EFFEXOR-XR) 37.5 mg capsule Take 1 tablet daily for the first week and then increased to 2 tablets daily. 60 Cap 3    celecoxib (CELEBREX) 200 mg capsule Take 1 Cap by mouth two (2) times a day. 180 Cap 3    zolpidem CR (AMBIEN CR) 6.25 mg tablet Take 1 Tab by mouth nightly as needed for Sleep. Max Daily Amount: 6.25 mg. 90 Tab 0    metoprolol succinate (Toprol XL) 25 mg XL tablet Take 0.5 Tabs by mouth nightly. 45 Tab 3    pantoprazole (PROTONIX) 40 mg tablet TAKE 1 TABLET BY MOUTH EVERY DAY 90 Tab 3    mometasone (NASONEX) 50 mcg/actuation nasal spray 2 Sprays by Both Nostrils route daily as needed.  ALPRAZolam (XANAX) 0.25 mg tablet Take 1.5 Tabs by mouth daily as needed for Anxiety. 45 Tab 1    albuterol sulfate (ProAir RespiClick) 90 mcg/actuation aepb Take 2 Puffs by inhalation every four (4) hours as needed for Wheezing.  PREVIDENT 5000 BOOSTER PLUS 1.1 % pste USE ONCE A DAY, PREFERABLY AT BEDTIME  4    desonide (TRIDESILON) 0.05 % topical lotion Apply  to affected area two (2) times daily as needed for Skin Irritation. 59 mL 0    diphenhydrAMINE (BENADRYL) 25 mg capsule Take 50 mg by mouth every six (6) hours as needed. Indications: ALLERGIC REACTIONS      cetirizine (ZYRTEC) 10 mg tablet Take 10 mg by mouth daily as needed. Allergies   Allergen Reactions    Other Food Other (comments)     PT STATES OTHER FOOD ALLERGIES WITH VARYING REACTIONS:  PORK, LETTUCE, LIMA BEANS, BLACK PEPPER, TEA, MALT, WALNUTS.  Beef Containing Products Anaphylaxis    Food Extracts Anaphylaxis     Beef, milk, peanuts    Milk Anaphylaxis    Peanut Anaphylaxis    Alpha-D-Galactosidase Diarrhea    Azithromycin Nausea and Vomiting    Erythromycin Nausea and Vomiting    Guaifenesin Nausea and Vomiting    Morphine Nausea and Vomiting     Projectile vomiting per patient    Other Medication Other (comments)     WHITE CELL STIMULATOR INJECTION (NEULASTIN?) CAUSED SEVERE BONE PAIN LASTING A COUPLE WEEKS.          Past Medical History:   Diagnosis Date    Anemia 10/3/13    \"SINCE I WAS A KID\"    Anxiety     DURING CHEMO; PT STAYS XANAX AS OF 10/3/13    Cancer ([facility] [id number]    breast,chemo ended 10/2012    Chronic pain     si joints sacrum    Depression     Nausea & vomiting     used patch in past with good results  Other ill-defined conditions(799.89)     anemia,chronic diarrhea, TMJ    Ovarian cyst     Palpitations     TAKES METOPROLOL FOR TACHYCARDIA    PUD (peptic ulcer disease)     occurred at age 33 .     Unspecified adverse effect of anesthesia     TMJ         Past Surgical History:   Procedure Laterality Date    HX BREAST RECONSTRUCTION  7/10/2013    BREAST NIPPLE RECONSTRUCTION REVISION performed by Onel Middleton MD [facility] MA[facility] OR    HX BREAST RECONSTRUCTION  2012    REINCISION LEFT BREAST FOR REMAINDER OF BREAST CA    HX BREAST RECONSTRUCTION  2012    R SEROMA EXCISED, REPLACE IMPLANT R BREAST    HX  SECTION      HX ENDOSCOPY      [facility] HX GI      anal fissure repair    HX GYN  10/21/13    TLH/BSO    HX HYSTERECTOMY  10/2013    Dr. Angela Cantu    HX MASTECTOMY  2012    double    HX OTHER SURGICAL      DENTAL SURGERIES WITH SEDATION    HX VASCULAR ACCESS  2012    PORTACATH INSERTION    HX VASCULAR ACCESS  2012    PORTCATH REMOVAL    [place] BREAST SURGERY PROCEDURE UNLISTED      breast biopsy x3 left breast    [place] COLONOSCOPY FLX DX W/COLLJ SPEC WHEN PFRMD  2011    Dr Kobe Mac    [place] LAMINOTOMY  2010         Family History   Problem Relation Age of Onset    Breast Cancer Mother 64    Cancer Mother 64         OF LUNG CA    Hypertension Father     Diabetes Father     Emphysema Father     COPD Father     Heart Attack Sister 50    Heart Disease Sister     Breast Cancer Sister     No Known Problems Brother     Anxiety Brother     No Known Problems Sister     Breast Cancer Paternal Grandmother     Malignant Hyperthermia Neg Hx     Pseudocholinesterase Deficiency Neg Hx     Delayed Awakening Neg Hx     Post-op Nausea/Vomiting Neg Hx     Emergence Delirium Neg Hx     Post-op Cognitive Dysfunction Neg Hx     Other Neg Hx        Social History     Tobacco Use    Smoking status: Never Smoker    Smokeless tobacco: Never Used Substance Use Topics    Alcohol use: No     Alcohol/week: 0.0 standard drinks        ROS    Physical Exam  Constitutional:       Appearance: Normal appearance. Pulmonary:      Effort: Pulmonary effort is normal.   Neurological:      Mental Status: She is alert. Psychiatric:         Mood and Affect: Mood normal.         Thought Content: Thought content normal.         ASSESSMENT and PLAN  Diagnoses and all orders for this visit:    1. Adjustment disorder with anxiety  Improved. Continue with current regimen. Due to this being a TeleHealth encounter (During [address] emergency), evaluation of the following organ systems was limited: Vital/Constitutional/EENT/Resp/CV/GI//MS/Neuro/Skin/Heme-Lymph-Imm. Pursuant to the emergency declaration under the [facility], 1135 waiver authority and the [employer] Act, this Virtual Visit was conducted, with patient's consent, to reduce the patient's risk of exposure to COVID-19 and provide continuity of care for an established patient. Services were provided through a video synchronous discussion virtually to substitute for in-person appointment. This visit was completed using [email or web address]    Time in virtual visit: 12 minutes

## 2021-04-08 LAB
25(OH)D3 SERPL-MCNC: 28.5 NG/ML (ref 30–100)
ALBUMIN SERPL-MCNC: 3.6 G/DL (ref 3.5–5)
ALBUMIN/GLOB SERPL: 1.1 {RATIO} (ref 1.1–2.2)
ALP SERPL-CCNC: 115 U/L (ref 45–117)
ALT SERPL-CCNC: 48 U/L (ref 12–78)
ANION GAP SERPL CALC-SCNC: 7 MMOL/L (ref 5–15)
AST SERPL-CCNC: 27 U/L (ref 15–37)
BILIRUB SERPL-MCNC: 0.5 MG/DL (ref 0.2–1)
BUN SERPL-MCNC: 10 MG/DL (ref 6–20)
BUN/CREAT SERPL: 18 (ref 12–20)
CALCIUM SERPL-MCNC: 8.7 MG/DL (ref 8.5–10.1)
CHLORIDE SERPL-SCNC: 106 MMOL/L (ref 97–108)
CHOLEST SERPL-MCNC: 198 MG/DL
CO2 SERPL-SCNC: 26 MMOL/L (ref 21–32)
CREAT SERPL-MCNC: 0.57 MG/DL (ref 0.55–1.02)
ERYTHROCYTE [DISTWIDTH] IN BLOOD BY AUTOMATED COUNT: 14 % (ref 11.5–14.5)
EST. AVERAGE GLUCOSE BLD GHB EST-MCNC: 123 MG/DL
GLOBULIN SER CALC-MCNC: 3.3 G/DL (ref 2–4)
GLUCOSE SERPL-MCNC: 141 MG/DL (ref 65–100)
HBA1C MFR BLD: 5.9 % (ref 4–5.6)
HCT VFR BLD AUTO: 39.9 % (ref 35–47)
HDLC SERPL-MCNC: 47 MG/DL
HDLC SERPL: 4.2 {RATIO} (ref 0–5)
HGB BLD-MCNC: 12.8 G/DL (ref 11.5–16)
LDLC SERPL CALC-MCNC: 131 MG/DL (ref 0–100)
LIPID PROFILE,FLP: ABNORMAL
MCH RBC QN AUTO: 26.7 PG (ref 26–34)
MCHC RBC AUTO-ENTMCNC: 32.1 G/DL (ref 30–36.5)
MCV RBC AUTO: 83.3 FL (ref 80–99)
NRBC # BLD: 0 K/UL (ref 0–0.01)
NRBC BLD-RTO: 0 PER 100 WBC
PLATELET # BLD AUTO: 265 K/UL (ref 150–400)
PMV BLD AUTO: 12.1 FL (ref 8.9–12.9)
POTASSIUM SERPL-SCNC: 3.4 MMOL/L (ref 3.5–5.1)
PROT SERPL-MCNC: 6.9 G/DL (ref 6.4–8.2)
RBC # BLD AUTO: 4.79 M/UL (ref 3.8–5.2)
SODIUM SERPL-SCNC: 139 MMOL/L (ref 136–145)
TRIGL SERPL-MCNC: 100 MG/DL (ref ?–150)
TSH SERPL DL<=0.05 MIU/L-ACNC: 1.34 UIU/ML (ref 0.36–3.74)
VLDLC SERPL CALC-MCNC: 20 MG/DL
WBC # BLD AUTO: 7.4 K/UL (ref 3.6–11)

## 2021-04-09 LAB — SARS-COV-2 ABS, NUCLEOCAPSID: NEGATIVE

## 2021-04-19 DIAGNOSIS — F51.01 PRIMARY INSOMNIA: ICD-10-CM

## 2021-04-19 RX ORDER — ZOLPIDEM TARTRATE 6.25 MG/1
6.25 TABLET, FILM COATED, EXTENDED RELEASE ORAL
Qty: 30 TAB | Refills: 0 | Status: SHIPPED | OUTPATIENT
Start: 2021-04-19 | End: 2021-07-20

## 2021-07-20 ENCOUNTER — TELEPHONE (OUTPATIENT)
Dept: FAMILY MEDICINE CLINIC | Age: 56
End: 2021-07-20

## 2021-07-20 DIAGNOSIS — F51.01 PRIMARY INSOMNIA: ICD-10-CM

## 2021-07-20 RX ORDER — ZOLPIDEM TARTRATE 6.25 MG/1
TABLET, FILM COATED, EXTENDED RELEASE ORAL
Qty: 90 TABLET | Refills: 0 | Status: SHIPPED | OUTPATIENT
Start: 2021-07-20 | End: 2021-11-17

## 2021-07-20 NOTE — TELEPHONE ENCOUNTER
Spoke with patient and will need an office visit prior to any further refills and control substance contract has . Appt has been scheduled for 2021 at 8:20am. Patient accepted appt.

## 2021-08-16 ENCOUNTER — OFFICE VISIT (OUTPATIENT)
Dept: FAMILY MEDICINE CLINIC | Age: 56
End: 2021-08-16
Payer: COMMERCIAL

## 2021-08-16 VITALS
HEART RATE: 72 BPM | OXYGEN SATURATION: 97 % | HEIGHT: 60 IN | WEIGHT: 200 LBS | SYSTOLIC BLOOD PRESSURE: 138 MMHG | BODY MASS INDEX: 39.27 KG/M2 | TEMPERATURE: 98.4 F | DIASTOLIC BLOOD PRESSURE: 78 MMHG | RESPIRATION RATE: 18 BRPM

## 2021-08-16 DIAGNOSIS — E78.00 HYPERCHOLESTEROLEMIA: ICD-10-CM

## 2021-08-16 DIAGNOSIS — F51.01 PRIMARY INSOMNIA: ICD-10-CM

## 2021-08-16 DIAGNOSIS — Z28.21 COVID-19 VACCINATION DECLINED: ICD-10-CM

## 2021-08-16 DIAGNOSIS — R73.02 IGT (IMPAIRED GLUCOSE TOLERANCE): ICD-10-CM

## 2021-08-16 DIAGNOSIS — G89.29 ENCOUNTER FOR CHRONIC PAIN MANAGEMENT: ICD-10-CM

## 2021-08-16 DIAGNOSIS — Z11.59 NEED FOR HEPATITIS C SCREENING TEST: ICD-10-CM

## 2021-08-16 DIAGNOSIS — J98.01 BRONCHOSPASM: ICD-10-CM

## 2021-08-16 DIAGNOSIS — Z12.11 COLON CANCER SCREENING: ICD-10-CM

## 2021-08-16 DIAGNOSIS — Z51.81 ENCOUNTER FOR MEDICATION MONITORING: ICD-10-CM

## 2021-08-16 DIAGNOSIS — F43.22 ADJUSTMENT DISORDER WITH ANXIETY: ICD-10-CM

## 2021-08-16 DIAGNOSIS — I10 ESSENTIAL HYPERTENSION: Primary | ICD-10-CM

## 2021-08-16 PROCEDURE — 99214 OFFICE O/P EST MOD 30 MIN: CPT | Performed by: FAMILY MEDICINE

## 2021-08-16 NOTE — PROGRESS NOTES
HISTORY OF PRESENT ILLNESS  Fco Angel is a 55 y.o. female. HPI   Follow up on chronic medical problems. Doing the precautionary measures at home to reduce risks of exposure COVID19. Also wearing mask when she is going out. No known sick contacts or known exposure to Marlo Jones. Cardiovascular Review:  The patient has hypertension. Diet and Lifestyle: generally follows a low fat low cholesterol diet, generally follows a low sodium diet, exercises sporadically  Home BP Monitoring: is not measured at home. Pertinent ROS: taking medications as instructed, no medication side effects noted, no TIA's, no chest pain on exertion, no dyspnea on exertion, no swelling of ankles. Anxiety Review:  Patient is seen for ADD, sleep disturbance and anxiety. Current treatment includes Xanax and Effexor. Anxiety has been improved over the summer with out the stress of working. Had been having increased anxiety related her job ending but overall she is coping well and doing well with not currently working. Has been feeling less depressed related to the job issues. No SI/HI    Ongoing symptoms include: insomnia. She is using [place] for sleep. Reported side effects from the treatment: none. Encounter for pain management. 1./2. Medical history/Past medical History  Chronic Pain:  Patient has osteoarthritis in the lower back and suffer with chronic pain in her lower back. She has been having increased pain at times in the mid right side of the back. Has been taking celebrex twice a day as long as she does not have GI upset. She uses the hydrocodone for more severe pain. Pain ranges from 6-9/10. MRI lumbar spine in 2009 showed DDD then with bulging disc and central radial teat in L4-5. She had laminectomy in 2010 for one disc. She has had multiple steroid epidural injections. She is still having some upper back pain that comes and goes. She did not get xray done from last visit.           Symptoms onset: problem is longstanding. Rheumatological ROS: stable, mild-to-moderate joint symptoms intermittently, reasonably well controlled by PRN meds. Response to treatment plan: stable. 3. Applicable records from prior treatment providers are apart of [facility] under the media tab. 4. Diagnostic, therapeutic and laboratory results are available in the [address] [place] [address] chart. 5. Consultation notes are available for review in the media tab of the [address] [place] [address] chart. 6. Treatment goals include pain control so that the pt may be as active and function with her daily activities and improved comfort level. Previously pt has been limited by pain. 7. The risks and benefits of treatment has been discussed at this office visit with the pt. She understands that the medication has addicting potential.  Additionally the pt has been advised that narcotic pain medication may impair mental and/or physical ability required for performance of tasks such as driving or operating any other machinery. 8. Pt has an updated signed pain contract on file thru 04/01/2020 and can be found under the FYI section of the [address] [place] [address] chart. Due to [place], pt was not able to be seen in the office to update her pain contract. Therefore new contract to update reviewed by nurse over the phone and copy will be mail to the pt to sign and mail back in.    9. The pain contract is reviewed. Pain medication will be continued at the previous dosage. Urine drug screening will be done today. Diagnostic studies are not indicated at this time. Interventional procedure are not indicated at this time. 10. Medication prescibed is HYDROcodone-acetaminophen (NORCO) 5-325 mg per tablet .  has been reviewed at this OV by me. 11. Patient instructions have been reviewed in detail as outlined above and in the pain contract. 12. Re-eval is planned for 3 months.      Patient Active Problem List   Diagnosis Code    Palpitations R00.2    Depression F32.9    Environmental allergies Z91.09    GERD (gastroesophageal reflux disease) K21.9    Insomnia G47.00    Essential hypertension I10    ADD (attention deficit disorder) F98.8    Encounter for medication monitoring Z51.81    Severe obesity (BMI 35.0-39. 9) with comorbidity (HCC) E66.01       Current Outpatient Medications   Medication Sig Dispense Refill    zolpidem CR (AMBIEN CR) 6.25 mg tablet TAKE 1 TABLET BY MOUTH EVERY EVENING AS NEEDED FOR SLEEP 90 Tablet 0    venlafaxine-SR (EFFEXOR-XR) 75 mg capsule TAKE 1 CAPSULE BY MOUTH EVERY DAY 30 Capsule 5    HYDROcodone-acetaminophen (NORCO) 5-325 mg per tablet Take 1 Tab by mouth every six (6) hours as needed for Pain.  EPINEPHrine (EPIPEN) 0.3 mg/0.3 mL injection USE AS DIRECTED FOR ALLERGIC REACTION 2 Syringe 1    celecoxib (CELEBREX) 200 mg capsule Take 1 Cap by mouth two (2) times a day. 180 Cap 3    metoprolol succinate (Toprol XL) 25 mg XL tablet Take 0.5 Tabs by mouth nightly. 45 Tab 3    pantoprazole (PROTONIX) 40 mg tablet TAKE 1 TABLET BY MOUTH EVERY DAY 90 Tab 3    mometasone (NASONEX) 50 mcg/actuation nasal spray 2 Sprays by Both Nostrils route daily as needed.  ALPRAZolam (XANAX) 0.25 mg tablet Take 1.5 Tabs by mouth daily as needed for Anxiety. 45 Tab 1    albuterol sulfate (ProAir RespiClick) 90 mcg/actuation aepb Take 2 Puffs by inhalation every four (4) hours as needed for Wheezing.  PREVIDENT 5000 BOOSTER PLUS 1.1 % pste USE ONCE A DAY, PREFERABLY AT BEDTIME  4    desonide (TRIDESILON) 0.05 % topical lotion Apply  to affected area two (2) times daily as needed for Skin Irritation. 59 mL 0    diphenhydrAMINE (BENADRYL) 25 mg capsule Take 50 mg by mouth every six (6) hours as needed. Indications: ALLERGIC REACTIONS      cetirizine (ZYRTEC) 10 mg tablet Take 10 mg by mouth daily as needed.          Allergies   Allergen Reactions    Other Food Other (comments)     PT STATES OTHER FOOD ALLERGIES WITH VARYING REACTIONS:  PORK, LETTUCE, LIMA BEANS, BLACK PEPPER, TEA, MALT, WALNUTS.  Beef Containing Products Anaphylaxis    Food Extracts Anaphylaxis     Beef, milk, peanuts    Milk Anaphylaxis    Peanut Anaphylaxis    Alpha-D-Galactosidase Diarrhea    Azithromycin Nausea and Vomiting    Erythromycin Nausea and Vomiting    Guaifenesin Nausea and Vomiting    Morphine Nausea and Vomiting     Projectile vomiting per patient    Other Medication Other (comments)     WHITE CELL STIMULATOR INJECTION (NEULASTIN?) CAUSED SEVERE BONE PAIN LASTING A COUPLE WEEKS. Past Medical History:   Diagnosis Date    Anemia 10/3/13    \"SINCE I WAS A KID\"    Anxiety     DURING CHEMO; PT STAYS XANAX AS OF 10/3/13    Cancer ([facility].) [phone]    breast,chemo ended 10/2012    Chronic pain     si joints sacrum    Depression     Nausea & vomiting     used patch in past with good results    Other ill-defined conditions(799.89)     anemia,chronic diarrhea, TMJ    Ovarian cyst     Palpitations     TAKES METOPROLOL FOR TACHYCARDIA    PUD (peptic ulcer disease)     occurred at age 33 .     Unspecified adverse effect of anesthesia     TMJ       Past Surgical History:   Procedure Laterality Date    HX BREAST RECONSTRUCTION  7/10/2013    BREAST NIPPLE RECONSTRUCTION REVISION performed by Pamella Crabtree MD at [facility] HX BREAST RECONSTRUCTION  2012    REINCISION LEFT BREAST FOR REMAINDER OF BREAST CA    HX BREAST RECONSTRUCTION  2012    R SEROMA EXCISED, REPLACE IMPLANT R BREAST    HX  SECTION      HX ENDOSCOPY      [facility] HX GI      anal fissure repair    HX GYN  10/21/13    TLH/BSO    HX HYSTERECTOMY  10/2013    Dr. Oliva Matson    HX MASTECTOMY  2012    double    HX OTHER SURGICAL      DENTAL SURGERIES WITH SEDATION    HX VASCULAR ACCESS  2012    PORTACATH INSERTION    HX VASCULAR ACCESS  2012    PORTCATH REMOVAL    ME BREAST SURGERY PROCEDURE UNLISTED      breast biopsy x3 left breast    CT COLONOSCOPY FLX DX W/COLLJ SPEC WHEN PFRMD  2011    Dr Carmina Hale    CT LAMINOTOMY  2010       Family History   Problem Relation Age of Onset    Breast Cancer Mother 64    Cancer Mother 64         OF LUNG CA    Hypertension Father     Diabetes Father     Emphysema Father     COPD Father     Heart Attack Sister 50    Heart Disease Sister     Breast Cancer Sister     No Known Problems Brother     Anxiety Brother     No Known Problems Sister     Breast Cancer Paternal Grandmother     Malignant Hyperthermia Neg Hx     Pseudocholinesterase Deficiency Neg Hx     Delayed Awakening Neg Hx     Post-op Nausea/Vomiting Neg Hx     Emergence Delirium Neg Hx     Post-op Cognitive Dysfunction Neg Hx     Other Neg Hx        Social History     Tobacco Use    Smoking status: Never Smoker    Smokeless tobacco: Never Used   Substance Use Topics    Alcohol use: No     Alcohol/week: 0.0 standard drinks        Lab Results   Component Value Date/Time    WBC 7.4 2021 02:40 PM    HGB 12.8 2021 02:40 PM    HCT 39.9 2021 02:40 PM    PLATELET 877  02:40 PM    MCV 83.3 2021 02:40 PM     Lab Results   Component Value Date/Time    Cholesterol, total 198 2021 02:40 PM    HDL Cholesterol 47 2021 02:40 PM    LDL, calculated 131 (H) 2021 02:40 PM    LDL-C, External 139 2015 01:33 PM    Triglyceride 100 2021 02:40 PM    CHOL/HDL Ratio 4.2 2021 02:40 PM     Lab Results   Component Value Date/Time    TSH 1.34 2021 02:40 PM    T4, Free 1.00 2019 11:53 AM      Lab Results   Component Value Date/Time    Sodium 139 2021 02:40 PM    Potassium 3.4 (L) 2021 02:40 PM    Chloride 106 2021 02:40 PM    CO2 26 2021 02:40 PM    Anion gap 7 2021 02:40 PM    Glucose 141 (H) 2021 02:40 PM    BUN 10 2021 02:40 PM    Creatinine 0.57 2021 02:40 PM    BUN/Creatinine ratio 18 2021 02:40 PM    GFR est AA >60 04/07/2021 02:40 PM    GFR est non-AA >60 04/07/2021 02:40 PM    Calcium 8.7 04/07/2021 02:40 PM    Bilirubin, total 0.5 04/07/2021 02:40 PM    ALT (SGPT) 48 04/07/2021 02:40 PM    Alk. phosphatase 115 04/07/2021 02:40 PM    Protein, total 6.9 04/07/2021 02:40 PM    Albumin 3.6 04/07/2021 02:40 PM    Globulin 3.3 04/07/2021 02:40 PM    A-G Ratio 1.1 04/07/2021 02:40 PM      Lab Results   Component Value Date/Time    Hemoglobin A1c 5.9 (H) 04/07/2021 02:40 PM    Hemoglobin A1c (POC) 6.0 04/22/2019 09:50 AM    Hemoglobin A1c, External 5.7 05/21/2015 01:33 PM         Review of Systems   Constitutional: Negative for malaise/fatigue. HENT: Negative for congestion. Eyes: Negative for blurred vision. Respiratory: Negative for cough and shortness of breath. Cardiovascular: Negative for chest pain, palpitations and leg swelling. Gastrointestinal: Negative for abdominal pain, constipation and heartburn. Genitourinary: Negative for dysuria, frequency and urgency. Musculoskeletal: Negative for back pain and joint pain. Neurological: Negative for dizziness, tingling and headaches. Endo/Heme/Allergies: Negative for environmental allergies. Psychiatric/Behavioral: Negative for depression. The patient does not have insomnia. Physical Exam  Vitals and nursing note reviewed. Constitutional:       Appearance: Normal appearance. She is well-developed. HENT:      Right Ear: Tympanic membrane and ear canal normal.      Left Ear: Tympanic membrane and ear canal normal.      Nose: No mucosal edema or rhinorrhea. Neck:      Thyroid: No thyromegaly. Cardiovascular:      Rate and Rhythm: Normal rate and regular rhythm. Heart sounds: Normal heart sounds. Pulmonary:      Effort: Pulmonary effort is normal.      Breath sounds: Normal breath sounds. Abdominal:      General: Bowel sounds are normal.      Palpations: Abdomen is soft. There is no mass. Tenderness:  There is no abdominal tenderness. Musculoskeletal:         General: No swelling. Normal range of motion. Cervical back: Normal range of motion and neck supple. Lymphadenopathy:      Cervical: No cervical adenopathy. Skin:     General: Skin is warm and dry. Neurological:      General: No focal deficit present. Mental Status: She is alert and oriented to person, place, and time. Psychiatric:         Mood and Affect: Mood normal.         ASSESSMENT and PLAN  Diagnoses and all orders for this visit:    1. Essential hypertension  Discussed sodium restriction, high k rich diet, maintaining ideal body weight and regular exercise program such as daily walking 30 min perday 4-5 times per week, as physiologic means to achieve blood pressure control. Medication compliance advised. 2. Hypercholesterolemia  Continue to monitor. Work on diet and exercise. 3. IGT (impaired glucose tolerance)  Continue to monitor. Work on diet and exercise. 4. Adjustment disorder with anxiety  Stable     5. Primary insomnia  Stable on ambien    6. Bronchospasm  -    Refill albuterol sulfate (ProAir RespiClick) 90 mcg/actuation breath activated inhaler; Take 2 Puffs by inhalation every four (4) hours as needed for Wheezing. 7. Encounter for medication monitoring    8. Encounter for chronic pain management  -     MONITOR SCREEN 10-DRUG CLASS PROFILE; Future  The pt has signed medication agreement. Pain contract is reviewed. Pain medications will be continued at the previous dosage. Urine drug screening will be done today. Diagnostic  studies are not indicated at this time. Interventional procedure are not indicated at this time. Re-eval in 3 months. 9. Colon cancer screening  -     OCCULT BLOOD IMMUNOASSAY,DIAGNOSTIC; Future    10. COVID-19 vaccination declined  Discussed vaccine and COVID19 illness. Risk and benefits of getting vaccinated reviewed.           Follow-up and Dispositions    · Return in about 4 months (around 12/16/2021). reviewed diet, exercise and weight control  cardiovascular risk and specific lipid/LDL goals reviewed  reviewed medications and side effects in detail    I have discussed diagnosis listed in this note with pt and/or family. I have discussed treatment plans and options and the risk/benefit analysis of those options, including safe use of medications and possible medication side effects. Through the use of shared decision making we have agreed to the above plan. The patient has received an after-visit summary and questions were answered concerning future plans and follow up. Advise pt of any urgent changes then to proceed to the ER.

## 2021-08-16 NOTE — LETTER
CONTROLLED SUBSTANCE MEDICATION AGREEMENT  Patient Name: Dasia Kemp  Patient YOB: 1965     I understand, that controlled substance medications may be used to help better manage my symptoms and to improve my ability to function at home, work and in social settings. However, I also understand that these medications do have risks, which have been discussed with me, including possible development of physical or psychological dependence. I understand that successful treatment requires mutual trust and honesty between me and my provider. I understand and agree that following this Medication Agreement is necessary in continuing my provider-patient relationship and the success of my treatment plan. Explanation from my Provider: Benefits and Goals of Controlled Substance Medications: There are two potential goals for your treatment: (1) decreased pain and suffering (2) improved daily life functions. There are many possible treatments for your chronic condition(s). Alternatives such as physical therapy, yoga, massage, home daily exercise, meditation, relaxation techniques, injections, chiropractic manipulations, surgery, cognitive therapy, hypnosis and many medications that are not habit-forming may be used. Use of controlled substance medications may be helpful, but they are unlikely to resolve all symptoms or restore all function. Explanation from my Provider: Risks of Controlled Substance Medications:   Opioid pain medications: These medications can lead to problems such as addiction/dependence, sedation, lightheadedness/dizziness, memory issues, falls, constipation, nausea, or vomiting. They may also impair the ability to drive or operate machinery. Additionally, these medications may lower testosterone levels, leading to loss of bone strength, stamina and sex drive.   They may cause problems with breathing, sleep apnea and reduced coughing, which is especially dangerous for patients with lung disease. Overdose or dangerous interactions with alcohol and other medications may occur, leading to death. Hyperalgesia may develop, which means patients receiving opioids for the treatment of pain may become more sensitive to certain painful stimuli, and in some cases, experience pain from ordinarily non-painful stimuli. Women between the ages of 14-53 who could become pregnant should carefully weigh the risks and benefits of opioids with their physicians, as these medications increase the risk of pregnancy complications, including miscarriage,  delivery and stillbirth. It is also possible for babies to be born addicted to opioids. Opioid dependence withdrawal symptoms may include; feelings of uneasiness, increased pain, irritability, belly pain, diarrhea, sweats and goose-flesh. Testosterone replacement therapy:  Potential side effects include increased risk of stroke and heart attack, blood clots, increased blood pressure, increased cholesterol, enlarged prostate, sleep apnea, irritability/aggression and other mood disorders, and decreased fertility. Gail Romero (1965)             Page 1 of 4    Initials:_______    Benzodiazepines and non-benzodiazepine sleep medications: These medications can lead to problems such as addiction/dependence, sedation, fatigue, lightheadedness, dizziness, incoordination, falls, depression, hallucinations, and impaired judgment, memory and concentration. The ability to drive and operate machinery may also be affected. Abnormal sleep-related behaviors have been reported, including sleepwalking, driving, making telephone calls, eating, or having sex while not fully awake. These medications can suppress breathing and worsen sleep apnea, particularly when combined with alcohol or other sedating medications, potentially leading to death.  Dependence withdrawal symptoms may include tremors, anxiety, hallucinations and seizures. Stimulants:  Common adverse effects include addiction/dependence, increased blood pressure and heart rate, decreased appetite, nausea, involuntary weight loss, insomnia,  irritability, and headaches. These risks may increase when these medications are combined with other stimulants, such as caffeine pills or energy drinks, certain weight loss supplements and oral decongestants. Dependence withdrawal symptoms may include depressed mood, loss of interest, suicidal thoughts, anxiety, fatigue, appetite changes and agitation. I agree and understand that I and my prescriber have the following rights and responsibilities regarding my treatment plan:   1. MY RIGHTS:  To be informed of my treatment and medication plan. To be an active participant in my health and wellbeing. 2. MY RESPONSIBILITY AND UNDERSTANDING FOR USE OF MEDICATIONS   I will take medications at the dose and frequency as directed. For my safety, I will not increase or change how I take my medications without the recommendation of my healthcare provider.  I will actively participate in any program recommended by my provider which may improve function, including social, physical, psychological programs.  I will not take my medications with alcohol or other drugs not prescribed to me. I understand that drinking alcohol with my medications increases the chances of side effects, including reduced breathing rate and could lead to personal injury when operating machinery.  I understand that if I have a history of substance use disorders, including alcohol or other illicit drugs, that I may be at increased risk of addiction to my medications.  I agree to notify my provider immediately if I should become pregnant so that my treatment plan can be adjusted.    I agree and understand that I shall only receive controlled substance medications from the prescriber that signed this agreement unless there is written agreement among other prescribers of controlled substances outlining the responsibility of the medications being prescribed.  I understand that the if the controlled medication is not helping to achieve goals, the dosage may be tapered and no longer prescribed. 3. MY RESPONSIBILITY FOR COMMUNICATION / PRESCRIPTION RENEWALS   I agree that all controlled substance medications that I take will be prescribed only by my provider. If another healthcare provider prescribes me medication in an emergency, I will notify my provider within seventy-two (72) hours. Vannesa Price (1965)             Page 2 of 4    Initials:_______   I will arrange for refills at the prescribed interval ONLY during regular office hours. I will not ask for refills earlier than agreed, after-hours, on holidays or weekends. Refills may take up to 72 hours for processing and prescriptions to reach the pharmacy.  I will inform my other health care providers that I am taking these medications and of the existence of this [id number]. In the event of an emergency, I will provide the same information to the emergency department prescribers.  I will keep my provider updated on the pharmacy I am using for controlled medication prescription filling. 4. MY RESPONSIBILITY FOR PROTECTING MEDICATIONS   I will protect my prescriptions and medications. I understand that lost or misplaced prescriptions will not be replaced.  I will keep medications only for my own use and will not share them with others. I will keep all medications away from children.  I agree that if my medications are adjusted or discontinued, I will properly dispose of any remaining medications. I understand that I will be required to dispose of any remaining controlled medications as, directed by my prescriber, prior to being provided with any prescriptions for other controlled medications.   Medication drop box locations can be found at: [email or web address]  5. MY RESPONSIBILITY WITH ILLEGAL DRUGS    I will not use illegal or street drugs or another person's prescription medications not prescribed to me.  If there are identified addiction type symptoms, then referral to a program may be provided by my provider and I agree to follow through with this recommendation. 6. MY RESPONSIBILITY FOR COOPERATION WITH INVESTIGATIONS   I understand that my provider will comply with any applicable law and may discuss my use and/or possible misuse/abuse of controlled substances and alcohol, as appropriate, with any health care provider involved in my care, pharmacist, or legal authority.  I authorize my provider and pharmacy to cooperate fully with law enforcement agencies (as permitted by law) in the investigation of any possible misuse, sale, or other diversion of my controlled substances.  I agree to waive any applicable privilege or right of privacy or confidentiality with respect to these authorizations. 7. PROVIDERS RIGHT TO MONITOR FOR SAFETY: PRESCRIPTION MONITORING / DRUG TESTING   I consent to drug/toxicology screening and will submit to a drug screen upon my providers request to assure I am only taking the prescribed drugs for my safety monitoring. I understand that a drug screen is a laboratory test in which a sample of my urine, blood or saliva is checked to see what drugs I have been taking. This may entail an observed urine specimen, which means that a nurse or other health care provider may watch me provide urine, and I will cooperate if I am asked to provide an observed specimen. Jose Miguel Diop (1965)             Page 3 of 4    Initials:_______  Claudio Reaper I understand that my provider will check a copy of my State Prescription Monitoring Program () Report in order to safely prescribe medications.      Pill Counts: I consent to pill counts when requested. I may be asked to bring all my prescribed controlled substance medications, in their original bottles, to all of my scheduled appointments. In addition, my provider may ask me to come to the practice at any time for a random pill count. 8. TERMINATION OF THIS AGREEMENT   For my safety, my prescriber has the right to stop prescribing controlled substance medications and may end this agreement.  Conditions that may result in termination of this agreement:  a. I do not show any improvement in pain, or my activity has not improved. b. I develop rapid tolerance or loss of improvement, as described in my treatment plan.  c. I develop significant side effects from the medication. d. My behavior is not consistent with the responsibilities outlined above, thereby causing safety concerns to continue prescribing controlled substance medications. e. I fail to follow the terms of this agreement. f. Other:____________________________     UNDERSTANDING THIS MEDICATION AGREEMENT:    I have read the above and have had all my questions answered. For chronic disease management, I know that my symptoms can be managed with many types of treatments. A chronic medication trial may be part of my treatment, but I must be an active participant in my care. Medication therapy is only one part of my symptom management plan. In some cases, there may be limited scientific evidence to support the chronic use of certain medications to improve symptoms and daily function. Furthermore, in certain circumstances, there may be scientific information that suggests that the use of chronic controlled substances may worsen my symptoms and increase my risk of unintentional death directly related to this medication therapy.   I know that if my provider feels my risk from controlled medications is greater than my benefit, I will have my controlled substance medication(s) compassionately lowered or removed altogether. I further agree to allow this office to contact my HIPAA contact if there are concerns about my safety and use of the controlled medications. I have agreed to use the prescribed controlled substance medications to me as instructed by my provider and as stated in this Medication Agreement. My initial on each page and my signature below shows that I have read each page and I have had the opportunity to ask questions with answers provided by my provider.       Patient Name (Printed): _____________________________________    Patient Signature:  ______________________   Date: _____________      Prescriber Name (Printed): ___________________________________    Prescriber Signature: _____________________  Date: _____________     Lindsay Clay (1965)             Page 4 of 4

## 2021-08-16 NOTE — PROGRESS NOTES
Chief Complaint   Patient presents with    Medication Management     follow up    Hypertension           Colonoscopy 9/14/2011 by Dr. Ppipa Dow repeat in 10 years      1. Have you been to the ER, urgent care clinic since your last visit? Hospitalized since your last visit? No    2. Have you seen or consulted any other health care providers outside of the [address] [place] [address] since your last visit? Include any pap smears or colon screening.  No

## 2021-08-17 LAB
AMPHETAMINES UR QL SCN: NEGATIVE NG/ML
BARBITURATES UR QL SCN: NEGATIVE NG/ML
BENZODIAZ UR QL SCN: NEGATIVE NG/ML
BZE UR QL SCN: NEGATIVE NG/ML
CANNABINOIDS UR QL SCN: NEGATIVE NG/ML
CREAT UR-MCNC: 131.6 MG/DL (ref 20–300)
METHADONE UR QL SCN: NEGATIVE NG/ML
OPIATES UR QL SCN: NEGATIVE NG/ML
OXYCODONE+OXYMORPHONE UR QL SCN: NEGATIVE NG/ML
PCP UR QL: NEGATIVE NG/ML
PH UR: 5.1 [PH] (ref 4.5–8.9)
PLEASE NOTE:, 733163: NORMAL
PROPOXYPH UR QL SCN: NEGATIVE NG/ML

## 2021-09-10 LAB — HEMOCCULT STL QL IA: NEGATIVE

## 2021-09-27 DIAGNOSIS — K21.9 GASTROESOPHAGEAL REFLUX DISEASE WITHOUT ESOPHAGITIS: ICD-10-CM

## 2021-09-27 RX ORDER — PANTOPRAZOLE SODIUM 40 MG/1
TABLET, DELAYED RELEASE ORAL
Qty: 90 TABLET | Refills: 3 | Status: SHIPPED | OUTPATIENT
Start: 2021-09-27 | End: 2022-10-14

## 2021-09-27 RX ORDER — METOPROLOL SUCCINATE 25 MG/1
TABLET, EXTENDED RELEASE ORAL
Qty: 45 TABLET | Refills: 3 | Status: SHIPPED | OUTPATIENT
Start: 2021-09-27 | End: 2022-10-14

## 2021-10-28 RX ORDER — VENLAFAXINE HYDROCHLORIDE 75 MG/1
75 CAPSULE, EXTENDED RELEASE ORAL DAILY
Qty: 90 CAPSULE | Refills: 3 | Status: SHIPPED | OUTPATIENT
Start: 2021-10-28

## 2021-10-28 NOTE — TELEPHONE ENCOUNTER
[facility] sent a 80 day fax request for patient's venlafaxine hcl er 75mg. Last visit:8/16/21  Next visit: 12/17/21  Previous refill 7/12/21(30+5R)    Requested Prescriptions     Pending Prescriptions Disp Refills    venlafaxine-SR (EFFEXOR-XR) 75 mg capsule 90 Capsule 2     Sig: Take 1 Capsule by mouth daily.

## 2021-11-16 DIAGNOSIS — F51.01 PRIMARY INSOMNIA: ICD-10-CM

## 2021-11-17 RX ORDER — ZOLPIDEM TARTRATE 6.25 MG/1
TABLET, FILM COATED, EXTENDED RELEASE ORAL
Qty: 90 TABLET | Refills: 0 | Status: SHIPPED | OUTPATIENT
Start: 2021-11-17 | End: 2022-02-15

## 2021-12-22 ENCOUNTER — OFFICE VISIT (OUTPATIENT)
Dept: FAMILY MEDICINE CLINIC | Age: 56
End: 2021-12-22
Payer: COMMERCIAL

## 2021-12-22 VITALS
HEIGHT: 60 IN | OXYGEN SATURATION: 98 % | RESPIRATION RATE: 12 BRPM | HEART RATE: 91 BPM | TEMPERATURE: 98 F | BODY MASS INDEX: 38.56 KG/M2 | SYSTOLIC BLOOD PRESSURE: 130 MMHG | DIASTOLIC BLOOD PRESSURE: 84 MMHG | WEIGHT: 196.4 LBS

## 2021-12-22 DIAGNOSIS — G89.29 CHRONIC MID BACK PAIN: ICD-10-CM

## 2021-12-22 DIAGNOSIS — M85.852 OSTEOPENIA OF BOTH HIPS: ICD-10-CM

## 2021-12-22 DIAGNOSIS — Z51.81 ENCOUNTER FOR MEDICATION MONITORING: ICD-10-CM

## 2021-12-22 DIAGNOSIS — Z13.820 ENCOUNTER FOR SCREENING FOR OSTEOPOROSIS: ICD-10-CM

## 2021-12-22 DIAGNOSIS — E55.9 HYPOVITAMINOSIS D: ICD-10-CM

## 2021-12-22 DIAGNOSIS — F43.22 ADJUSTMENT DISORDER WITH ANXIETY: ICD-10-CM

## 2021-12-22 DIAGNOSIS — F41.9 ANXIETY: ICD-10-CM

## 2021-12-22 DIAGNOSIS — M54.9 CHRONIC MID BACK PAIN: ICD-10-CM

## 2021-12-22 DIAGNOSIS — R73.02 IGT (IMPAIRED GLUCOSE TOLERANCE): ICD-10-CM

## 2021-12-22 DIAGNOSIS — M85.851 OSTEOPENIA OF BOTH HIPS: ICD-10-CM

## 2021-12-22 DIAGNOSIS — Z11.59 NEED FOR HEPATITIS C SCREENING TEST: ICD-10-CM

## 2021-12-22 DIAGNOSIS — E78.00 HYPERCHOLESTEROLEMIA: ICD-10-CM

## 2021-12-22 DIAGNOSIS — I10 ESSENTIAL HYPERTENSION: Primary | ICD-10-CM

## 2021-12-22 DIAGNOSIS — E66.9 NON MORBID OBESITY: ICD-10-CM

## 2021-12-22 DIAGNOSIS — M47.816 LUMBAR ARTHROPATHY: ICD-10-CM

## 2021-12-22 DIAGNOSIS — M79.7 FIBROMYALGIA: ICD-10-CM

## 2021-12-22 PROBLEM — Z90.13 HISTORY OF BILATERAL MASTECTOMY: Status: ACTIVE | Noted: 2021-12-22

## 2021-12-22 PROCEDURE — 99214 OFFICE O/P EST MOD 30 MIN: CPT | Performed by: FAMILY MEDICINE

## 2021-12-22 RX ORDER — CYCLOBENZAPRINE HCL 5 MG
5 TABLET ORAL
COMMUNITY
Start: 2021-12-14

## 2021-12-22 RX ORDER — MILNACIPRAN HYDROCHLORIDE 50 MG/1
TABLET, FILM COATED ORAL
COMMUNITY
Start: 2021-11-16 | End: 2022-07-18 | Stop reason: ALTCHOICE

## 2021-12-22 RX ORDER — ALPRAZOLAM 0.25 MG/1
0.38 TABLET ORAL
Qty: 45 TABLET | Refills: 1 | Status: SHIPPED | OUTPATIENT
Start: 2021-12-22

## 2021-12-22 RX ORDER — MULTIVIT WITH MINERALS/HERBS
1 TABLET ORAL DAILY
COMMUNITY

## 2021-12-22 RX ORDER — MULTIVITAMIN
1 TABLET ORAL DAILY
COMMUNITY

## 2021-12-22 NOTE — PROGRESS NOTES
Chief Complaint   Patient presents with    Hypertension    Cholesterol Problem       1. Have you been to the ER, urgent care clinic since your last visit? Hospitalized since your last visit? No    2. Have you seen or consulted any other health care providers outside of the [address] [place] [address] since your last visit? Include any pap smears or colon screening. Yes, Rheum for fibromyalgia.      Flu shot - Pt declined     Health Maintenance Due   Topic Date Due    Hepatitis C Screening  Never done    Breast Cancer Screen Mammogram  03/02/2013    Shingrix Vaccine Age 50> (2 of 2) 12/15/2020    Flu Vaccine (1) 09/01/2021

## 2021-12-22 NOTE — PROGRESS NOTES
HISTORY OF PRESENT ILLNESS  Tha Garcia is a 55 y.o. female. Follow up on chronic medical problems. Doing the precautionary measures at home to reduce risks of exposure COVID19. Also wearing mask when she is going out. No known sick contacts or known exposure to [employer] [address]. Cardiovascular Review:  The patient has hypertension. Diet and Lifestyle: generally follows a low fat low cholesterol diet, generally follows a low sodium diet, exercises sporadically  Home BP Monitoring: is not measured at home. Pertinent ROS: taking medications as instructed, no medication side effects noted, no TIA's, no chest pain on exertion, no dyspnea on exertion, no swelling of ankles. Anxiety Review:  Patient is seen for ADD, sleep disturbance and anxiety. Current treatment includes Xanax and Effexor. Anxiety has been improved over the summer since she has not been working. Had been having increased anxiety related her job ending but overall she is coping well and doing well with not currently working. Has been feeling less depressed related to the job issues. No SI/HI    Ongoing symptoms include: insomnia. She is using [place] for sleep. Reported side effects from the treatment: none. Encounter for pain management. 1./2. Medical history/Past medical History  Chronic Pain:  Patient has osteoarthritis in the lower back and suffer with chronic pain in her lower back. She has been having increased pain at times in the mid right side of the back. Has been taking celebrex twice a day as long as she does not have GI upset. She uses the hydrocodone for more severe pain. Pain ranges from 6-9/10. MRI lumbar spine in 2009 showed DDD then with bulging disc and central radial teat in L4-5. She had laminectomy in 2010 for one disc. She has had multiple steroid epidural injections. She is still having some upper back pain that comes and goes.     Did see a rheumatologist since her last visit and confirmed dx of fibromyalgia. Tried to switch her to savella but she did not tolerated so decided to leave her medications as is. Symptoms onset: problem is longstanding. Rheumatological ROS: stable, mild-to-moderate joint symptoms intermittently, reasonably well controlled by PRN meds. Response to treatment plan: stable. 3. Applicable records from prior treatment providers are apart of [facility] under the media tab. 4. Diagnostic, therapeutic and laboratory results are available in the [facility] chart. 5. Consultation notes are available for review in the media tab of the [facility] chart. 6. Treatment goals include pain control so that the pt may be as active and function with her daily activities and improved comfort level. Previously pt has been limited by pain. 7. The risks and benefits of treatment has been discussed at this office visit with the pt. She understands that the medication has addicting potential.  Additionally the pt has been advised that narcotic pain medication may impair mental and/or physical ability required for performance of tasks such as driving or operating any other machinery. 8. Pt has an updated signed pain contract on file and can be found under the FYI section of the [facility] chart. Due to [place], pt was not able to be seen in the office to update her pain contract. Therefore new contract to update reviewed by nurse over the phone and copy will be mail to the pt to sign and mail back in.    9. The pain contract is reviewed. Pain medication will be continued at the previous dosage. Urine drug screening will not be done today. Diagnostic studies are not indicated at this time. Interventional procedure are not indicated at this time. 10. Medication prescibed is HYDROcodone-acetaminophen (NORCO) 5-325 mg per tablet . She has not had refill since 10/2020.  has been reviewed at this OV by me.     11. Patient instructions have been reviewed in detail as outlined above and in the pain contract. 12. Re-eval is planned for 6 months. Patient Active Problem List   Diagnosis Code    Palpitations R00.2    Depression F32. A    Environmental allergies Z91.09    GERD (gastroesophageal reflux disease) K21.9    Insomnia G47.00    Essential hypertension I10    ADD (attention deficit disorder) F98.8    Encounter for medication monitoring Z51.81    Severe obesity (BMI 35.0-39. 9) with comorbidity (HCC) E66.01       Current Outpatient Medications   Medication Sig Dispense Refill    cyclobenzaprine (FLEXERIL) 5 mg tablet Take 5 mg by mouth three (3) times daily as needed.  multivit-min/ferrous fumarate (MULTI VITAMIN PO) Take 1 Tablet by mouth daily.  b complex vitamins tablet Take 1 Tablet by mouth daily.  calcium-cholecalciferol, D3, (CALTRATE 600+D) tablet Take 1 Tablet by mouth daily.  ALPRAZolam (XANAX) 0.25 mg tablet Take 1.5 Tablets by mouth daily as needed for Anxiety. 45 Tablet 1    zolpidem CR (AMBIEN CR) 6.25 mg tablet TAKE 1 TABLET BY MOUTH EVERY DAY IN THE EVENING AS NEEDED FOR SLEEP 90 Tablet 0    venlafaxine-SR (EFFEXOR-XR) 75 mg capsule Take 1 Capsule by mouth daily. 90 Capsule 3    pantoprazole (PROTONIX) 40 mg tablet TAKE 1 TABLET BY MOUTH EVERY DAY 90 Tablet 3    metoprolol succinate (TOPROL-XL) 25 mg XL tablet TAKE 1/2 TABLET BY MOUTH NIGHTLY 45 Tablet 3    albuterol sulfate (ProAir RespiClick) 90 mcg/actuation breath activated inhaler Take 2 Puffs by inhalation every four (4) hours as needed for Wheezing. 1 Inhaler 11    EPINEPHrine (EPIPEN) 0.3 mg/0.3 mL injection USE AS DIRECTED FOR ALLERGIC REACTION 2 Syringe 1    celecoxib (CELEBREX) 200 mg capsule Take 1 Cap by mouth two (2) times a day. 180 Cap 3    mometasone (NASONEX) 50 mcg/actuation nasal spray 2 Sprays by Both Nostrils route daily as needed.       PREVIDENT 5000 BOOSTER PLUS 1.1 % pste USE ONCE A DAY, PREFERABLY AT BEDTIME  4    desonide (TRIDESILON) 0.05 % topical lotion Apply  to affected area two (2) times daily as needed for Skin Irritation. 59 mL 0    diphenhydrAMINE (BENADRYL) 25 mg capsule Take 50 mg by mouth every six (6) hours as needed. Indications: ALLERGIC REACTIONS      cetirizine (ZYRTEC) 10 mg tablet Take 10 mg by mouth daily as needed.  Savella 50 mg tablet  (Patient not taking: Reported on 12/22/2021)         Allergies   Allergen Reactions    Other Food Other (comments)     PT STATES OTHER FOOD ALLERGIES WITH VARYING REACTIONS:  PORK, LETTUCE, LIMA BEANS, BLACK PEPPER, TEA, MALT, WALNUTS.  Beef Containing Products Anaphylaxis    Food Extracts Anaphylaxis     Beef, milk, peanuts    Milk Anaphylaxis    Peanut Anaphylaxis    Alpha-D-Galactosidase Diarrhea    Azithromycin Nausea and Vomiting    Erythromycin Nausea and Vomiting    Guaifenesin Nausea and Vomiting    Morphine Nausea and Vomiting     Projectile vomiting per patient    Other Medication Other (comments)     WHITE CELL STIMULATOR INJECTION (NEULASTIN?) CAUSED SEVERE BONE PAIN LASTING A COUPLE WEEKS. Past Medical History:   Diagnosis Date    Anemia 10/3/13    \"SINCE I WAS A KID\"    Anxiety     DURING CHEMO; PT STAYS XANAX AS OF 10/3/13    Cancer ([facility] [id number]    breast,chemo ended 10/2012    Chronic pain     si joints sacrum    Depression     Fibromyalgia     Nausea & vomiting     used patch in past with good results    Other ill-defined conditions(799.89)     anemia,chronic diarrhea, TMJ    Ovarian cyst     Palpitations     TAKES METOPROLOL FOR TACHYCARDIA    PUD (peptic ulcer disease)     occurred at age 33 .     Unspecified adverse effect of anesthesia     TMJ         Past Surgical History:   Procedure Laterality Date    HX BREAST RECONSTRUCTION  7/10/2013    BREAST NIPPLE RECONSTRUCTION REVISION performed by Alexey Griffith MD at [address] [place] [address] HX BREAST RECONSTRUCTION  5/22/2012    REINCISION LEFT BREAST FOR REMAINDER OF BREAST CA    HX BREAST RECONSTRUCTION  2012    R SEROMA EXCISED, REPLACE IMPLANT R BREAST    HX  SECTION  1984    HX ENDOSCOPY      [facility] HX GI      anal fissure repair    HX GYN  10/21/13    TLH/BSO    HX HYSTERECTOMY  10/2013    Dr. Claritza Alonso    HX MASTECTOMY  2012    double    HX OTHER SURGICAL      DENTAL SURGERIES WITH SEDATION    HX VASCULAR ACCESS  2012    PORTACATH INSERTION    HX VASCULAR ACCESS  2012    PORTCATH REMOVAL    [place] BREAST SURGERY PROCEDURE UNLISTED      breast biopsy x3 left breast    [place] COLONOSCOPY FLX DX W/COLLJ SPEC WHEN PFRMD  2011    Dr Alec Oates    [place] LAMINOTOMY  2010         Family History   Problem Relation Age of Onset    Breast Cancer Mother 64    Cancer Mother 64         OF LUNG CA    Hypertension Father     Diabetes Father     Emphysema Father     COPD Father     Heart Attack Sister 50    Heart Disease Sister     Breast Cancer Sister     No Known Problems Brother     Anxiety Brother     No Known Problems Sister     Breast Cancer Paternal Grandmother     Malignant Hyperthermia Neg Hx     Pseudocholinesterase Deficiency Neg Hx     Delayed Awakening Neg Hx     Post-op Nausea/Vomiting Neg Hx     Emergence Delirium Neg Hx     Post-op Cognitive Dysfunction Neg Hx     Other Neg Hx        Social History     Tobacco Use    Smoking status: Never Smoker    Smokeless tobacco: Never Used   Substance Use Topics    Alcohol use: No     Alcohol/week: 0.0 standard drinks        Lab Results   Component Value Date/Time    WBC 7.4 2021 02:40 PM    HGB 12.8 2021 02:40 PM    HCT 39.9 2021 02:40 PM    PLATELET 628  02:40 PM    MCV 83.3 2021 02:40 PM     Lab Results   Component Value Date/Time    Cholesterol, total 198 2021 02:40 PM    HDL Cholesterol 47 2021 02:40 PM    LDL, calculated 131 (H) 2021 02:40 PM    LDL-C, External 139 2015 01:33 PM    Triglyceride 100 2021 02:40 PM CHOL/HDL Ratio 4.2 04/07/2021 02:40 PM     Lab Results   Component Value Date/Time    TSH 1.34 04/07/2021 02:40 PM    T4, Free 1.00 06/28/2019 11:53 AM      Lab Results   Component Value Date/Time    Sodium 139 04/07/2021 02:40 PM    Potassium 3.4 (L) 04/07/2021 02:40 PM    Chloride 106 04/07/2021 02:40 PM    CO2 26 04/07/2021 02:40 PM    Anion gap 7 04/07/2021 02:40 PM    Glucose 141 (H) 04/07/2021 02:40 PM    BUN 10 04/07/2021 02:40 PM    Creatinine 0.57 04/07/2021 02:40 PM    BUN/Creatinine ratio 18 04/07/2021 02:40 PM    GFR est AA >60 04/07/2021 02:40 PM    GFR est non-AA >60 04/07/2021 02:40 PM    Calcium 8.7 04/07/2021 02:40 PM    Bilirubin, total 0.5 04/07/2021 02:40 PM    ALT (SGPT) 48 04/07/2021 02:40 PM    Alk. phosphatase 115 04/07/2021 02:40 PM    Protein, total 6.9 04/07/2021 02:40 PM    Albumin 3.6 04/07/2021 02:40 PM    Globulin 3.3 04/07/2021 02:40 PM    A-G Ratio 1.1 04/07/2021 02:40 PM      Lab Results   Component Value Date/Time    Hemoglobin A1c 5.9 (H) 04/07/2021 02:40 PM    Hemoglobin A1c (POC) 6.0 04/22/2019 09:50 AM    Hemoglobin A1c, External 5.7 05/21/2015 01:33 PM         Review of Systems   Constitutional: Negative for malaise/fatigue. HENT: Negative for congestion. Eyes: Negative for blurred vision. Respiratory: Negative for cough and shortness of breath. Cardiovascular: Negative for chest pain, palpitations and leg swelling. Gastrointestinal: Negative for abdominal pain, constipation and heartburn. Genitourinary: Negative for dysuria, frequency and urgency. Neurological: Negative for dizziness, tingling and headaches. Endo/Heme/Allergies: Negative for environmental allergies. Psychiatric/Behavioral: Negative for depression. The patient does not have insomnia. Physical Exam  Vitals and nursing note reviewed. Constitutional:       Appearance: Normal appearance. She is well-developed.       Comments: /84   Pulse 91   Temp 98 °F (36.7 °C) (Oral) Resp 12   Ht 5' (1.524 m)   Wt 196 lb 6.4 oz (89.1 kg)   LMP 08/03/2012   SpO2 98%   BMI 38.36 kg/m²      HENT:      Right Ear: Tympanic membrane and ear canal normal.      Left Ear: Tympanic membrane and ear canal normal.      Nose: No mucosal edema. Neck:      Thyroid: No thyromegaly. Cardiovascular:      Rate and Rhythm: Normal rate and regular rhythm. Heart sounds: Normal heart sounds. Pulmonary:      Effort: Pulmonary effort is normal.      Breath sounds: Normal breath sounds. Abdominal:      General: Bowel sounds are normal.      Palpations: Abdomen is soft. There is no mass. Tenderness: There is no abdominal tenderness. Musculoskeletal:         General: No swelling. Normal range of motion. Cervical back: Normal range of motion and neck supple. Right lower leg: No edema. Left lower leg: No edema. Lymphadenopathy:      Cervical: No cervical adenopathy. Skin:     General: Skin is warm and dry. Neurological:      General: No focal deficit present. Mental Status: She is alert and oriented to person, place, and time. Psychiatric:         Mood and Affect: Mood normal.       ASSESSMENT and PLAN  Diagnoses and all orders for this visit:    1. Essential hypertension  Stable. Discussed sodium restriction, high k rich diet, maintaining ideal body weight and regular exercise program such as daily walking 30 min perday 4-5 times per week, as physiologic means to achieve blood pressure control. Medication compliance advised. 2. Hypercholesterolemia  Continue to monitor. Work on diet and exercise.  -     LIPID PANEL; Future    3. IGT (impaired glucose tolerance)  Continue to monitor. Work on diet and exercise.  -     HEMOGLOBIN A1C WITH EAG; Future    4. Adjustment disorder with anxiety//  5. Anxiety  -     Refill ALPRAZolam (XANAX) 0.25 mg tablet; Take 1.5 Tablets by mouth daily as needed for Anxiety. 6. Chronic mid back pain//  7. Lumbar arthropathy//  8. Fibromyalgia  Stable on current regimen    9. Hypovitaminosis D  -     VITAMIN D, 25 HYDROXY; Future    10. Encounter for medication monitoring  -     METABOLIC PANEL, BASIC; Future    11. Need for hepatitis C screening test  -     HEPATITIS C AB; Future    12. Osteopenia of both hips  -     DEXA BONE DENSITY STUDY AXIAL; Future    13. Encounter for screening for osteoporosis  -     DEXA BONE DENSITY STUDY AXIAL; Future    14. Non Morbid Obesity  I have reviewed/discussed the above normal BMI with the patient. I have recommended the following interventions: dietary management education, guidance, and counseling . Follow-up and Dispositions    · Return in about 5 months (around 6/6/2022).        current treatment plan is effective, no change in therapy  reviewed diet, exercise and weight control  cardiovascular risk and specific lipid/LDL goals reviewed  reviewed medications and side effects in detail

## 2021-12-23 LAB
25(OH)D3 SERPL-MCNC: 44.1 NG/ML (ref 30–100)
ANION GAP SERPL CALC-SCNC: 7 MMOL/L (ref 5–15)
BUN SERPL-MCNC: 10 MG/DL (ref 6–20)
BUN/CREAT SERPL: 14 (ref 12–20)
CALCIUM SERPL-MCNC: 9.2 MG/DL (ref 8.5–10.1)
CHLORIDE SERPL-SCNC: 105 MMOL/L (ref 97–108)
CHOLEST SERPL-MCNC: 210 MG/DL
CO2 SERPL-SCNC: 27 MMOL/L (ref 21–32)
CREAT SERPL-MCNC: 0.7 MG/DL (ref 0.55–1.02)
EST. AVERAGE GLUCOSE BLD GHB EST-MCNC: 114 MG/DL
GLUCOSE SERPL-MCNC: 103 MG/DL (ref 65–100)
HBA1C MFR BLD: 5.6 % (ref 4–5.6)
HCV AB SERPL QL IA: NONREACTIVE
HDLC SERPL-MCNC: 41 MG/DL
HDLC SERPL: 5.1 {RATIO} (ref 0–5)
LDLC SERPL CALC-MCNC: 146.6 MG/DL (ref 0–100)
POTASSIUM SERPL-SCNC: 3.8 MMOL/L (ref 3.5–5.1)
SODIUM SERPL-SCNC: 139 MMOL/L (ref 136–145)
TRIGL SERPL-MCNC: 112 MG/DL (ref ?–150)
VLDLC SERPL CALC-MCNC: 22.4 MG/DL

## 2021-12-29 DIAGNOSIS — M54.50 CHRONIC MIDLINE LOW BACK PAIN WITHOUT SCIATICA: ICD-10-CM

## 2021-12-29 DIAGNOSIS — G89.29 CHRONIC MIDLINE LOW BACK PAIN WITHOUT SCIATICA: ICD-10-CM

## 2021-12-29 RX ORDER — CELECOXIB 200 MG/1
CAPSULE ORAL
Qty: 180 CAPSULE | Refills: 3 | Status: SHIPPED | OUTPATIENT
Start: 2021-12-29

## 2022-02-15 DIAGNOSIS — F51.01 PRIMARY INSOMNIA: ICD-10-CM

## 2022-02-15 RX ORDER — ZOLPIDEM TARTRATE 6.25 MG/1
TABLET, FILM COATED, EXTENDED RELEASE ORAL
Qty: 90 TABLET | Refills: 0 | Status: SHIPPED | OUTPATIENT
Start: 2022-02-15 | End: 2022-05-19 | Stop reason: SDUPTHER

## 2022-03-04 DIAGNOSIS — N30.00 ACUTE CYSTITIS WITHOUT HEMATURIA: Primary | ICD-10-CM

## 2022-03-04 RX ORDER — SULFAMETHOXAZOLE AND TRIMETHOPRIM 800; 160 MG/1; MG/1
1 TABLET ORAL 2 TIMES DAILY
Qty: 10 TABLET | Refills: 0 | Status: SHIPPED | OUTPATIENT
Start: 2022-03-04 | End: 2022-03-07 | Stop reason: ALTCHOICE

## 2022-03-04 NOTE — TELEPHONE ENCOUNTER
Patient wants to get something called in for a bladder infection.    Please give her a call @ [phone]

## 2022-03-04 NOTE — TELEPHONE ENCOUNTER
Spoke with patient and c/o burning with urination, frequency, and patient states her urine is light pink in color. Patient would like an antibiotic to the pharm. Informed patient will check with Dr. Rose.

## 2022-03-04 NOTE — TELEPHONE ENCOUNTER
Called patient and informed that Dr. Derrick Lindquist will be sending antibiotic to the pharm and drink plenty of water. Patient verbalized understanding.

## 2022-03-07 DIAGNOSIS — N30.00 ACUTE CYSTITIS WITHOUT HEMATURIA: Primary | ICD-10-CM

## 2022-03-07 RX ORDER — NITROFURANTOIN 25; 75 MG/1; MG/1
100 CAPSULE ORAL 2 TIMES DAILY
Qty: 10 CAPSULE | Refills: 0 | Status: SHIPPED | OUTPATIENT
Start: 2022-03-07 | End: 2022-03-12

## 2022-03-07 NOTE — TELEPHONE ENCOUNTER
Spoke to patient and informed Sulfa was not listed as allergic. Patient states it causes projectile vomiting. Informed patient will put in chart as allergy. Patient would like a different antibiotic sent to the pharm.

## 2022-03-07 NOTE — TELEPHONE ENCOUNTER
Patient states that she got a RX                    trimethoprim-sulfamethoxazole (BACTRIM DS, SEPTRA DS) 160-800 mg per tablet it was for a UTI she says that she is allergic to sulfa can she get something else she can be reached @ 21 [phone]

## 2022-03-19 PROBLEM — Z90.13 HISTORY OF BILATERAL MASTECTOMY: Status: ACTIVE | Noted: 2021-12-22

## 2022-03-19 PROBLEM — Z51.81 ENCOUNTER FOR MEDICATION MONITORING: Status: ACTIVE | Noted: 2017-01-17

## 2022-03-19 PROBLEM — E66.01 SEVERE OBESITY (BMI 35.0-39.9) WITH COMORBIDITY (HCC): Status: ACTIVE | Noted: 2018-04-13

## 2022-05-19 DIAGNOSIS — F51.01 PRIMARY INSOMNIA: ICD-10-CM

## 2022-05-19 RX ORDER — ZOLPIDEM TARTRATE 6.25 MG/1
TABLET, FILM COATED, EXTENDED RELEASE ORAL
Qty: 90 TABLET | Refills: 0 | Status: CANCELLED | OUTPATIENT
Start: 2022-05-19

## 2022-05-19 RX ORDER — ZOLPIDEM TARTRATE 6.25 MG/1
6.25 TABLET, FILM COATED, EXTENDED RELEASE ORAL
Qty: 90 TABLET | Refills: 0 | Status: SHIPPED | OUTPATIENT
Start: 2022-05-19 | End: 2022-08-19

## 2022-07-18 ENCOUNTER — OFFICE VISIT (OUTPATIENT)
Dept: FAMILY MEDICINE CLINIC | Age: 57
End: 2022-07-18

## 2022-07-18 VITALS
RESPIRATION RATE: 20 BRPM | WEIGHT: 195 LBS | OXYGEN SATURATION: 97 % | TEMPERATURE: 98.4 F | HEART RATE: 74 BPM | SYSTOLIC BLOOD PRESSURE: 142 MMHG | HEIGHT: 60 IN | BODY MASS INDEX: 38.28 KG/M2 | DIASTOLIC BLOOD PRESSURE: 79 MMHG

## 2022-07-18 DIAGNOSIS — M79.7 FIBROMYALGIA: ICD-10-CM

## 2022-07-18 DIAGNOSIS — F43.22 ADJUSTMENT DISORDER WITH ANXIETY: ICD-10-CM

## 2022-07-18 DIAGNOSIS — Z51.81 ENCOUNTER FOR MEDICATION MONITORING: ICD-10-CM

## 2022-07-18 DIAGNOSIS — M54.9 CHRONIC MID BACK PAIN: ICD-10-CM

## 2022-07-18 DIAGNOSIS — E66.9 NON MORBID OBESITY: ICD-10-CM

## 2022-07-18 DIAGNOSIS — G89.29 CHRONIC MID BACK PAIN: ICD-10-CM

## 2022-07-18 DIAGNOSIS — Z23 ENCOUNTER FOR IMMUNIZATION: ICD-10-CM

## 2022-07-18 DIAGNOSIS — R73.02 IGT (IMPAIRED GLUCOSE TOLERANCE): ICD-10-CM

## 2022-07-18 DIAGNOSIS — I10 ESSENTIAL HYPERTENSION: Primary | ICD-10-CM

## 2022-07-18 DIAGNOSIS — M47.816 LUMBAR ARTHROPATHY: ICD-10-CM

## 2022-07-18 DIAGNOSIS — E78.00 HYPERCHOLESTEROLEMIA: ICD-10-CM

## 2022-07-18 PROCEDURE — 0054A COVID-19, PFIZER GRAY TOP, DO NOT DILUTE, (AGE 12 Y+), IM, 30MCG/0.3 ML: CPT | Performed by: FAMILY MEDICINE

## 2022-07-18 PROCEDURE — 91305 COVID-19, PFIZER GRAY TOP, DO NOT DILUTE, (AGE 12 Y+), IM, 30MCG/0.3 ML: CPT | Performed by: FAMILY MEDICINE

## 2022-07-18 PROCEDURE — 99214 OFFICE O/P EST MOD 30 MIN: CPT | Performed by: FAMILY MEDICINE

## 2022-07-18 NOTE — PROGRESS NOTES
Rm    Chief Complaint   Patient presents with    Medication Evaluation    Allergies        Visit Vitals  BP (!) 142/79 (BP 1 Location: Left upper arm, BP Patient Position: Sitting, BP Cuff Size: Large adult)   Pulse 74   Temp 98.4 °F (36.9 °C) (Oral)   Resp 20   Ht 5' (1.524 m)   Wt 195 lb (88.5 kg)   LMP 08/03/2012   SpO2 97%   BMI 38.08 kg/m²        1. Have you been to the ER, urgent care clinic since your last visit? Hospitalized since your last visit? No    2. Have you seen or consulted any other health care providers outside of the [address] [place] [address] since your last visit? Include any pap smears or colon screening. No     Health Maintenance Due   Topic Date Due    Shingrix Vaccine Age 49> (2 of 2) 12/15/2020    COVID-19 Vaccine (3 - Booster for [place] series) 02/25/2022        3 most recent PHQ Screens 7/18/2022   Little interest or pleasure in doing things Not at all   Feeling down, depressed, irritable, or hopeless Not at all   Total Score PHQ 2 0   Trouble falling or staying asleep, or sleeping too much Not at all   Feeling tired or having little energy Not at all   Poor appetite, weight loss, or overeating Not at all   Feeling bad about yourself - or that you are a failure or have let yourself or your family down Not at all   Trouble concentrating on things such as school, work, reading, or watching TV Not at all   Moving or speaking so slowly that other people could have noticed; or the opposite being so fidgety that others notice Not at all   Thoughts of being better off dead, or hurting yourself in some way Not at all   PHQ 9 Score 0        Fall Risk Assessment, last 12 mths 3/22/2021   Able to walk? Yes   Fall in past 12 months? 0   Do you feel unsteady?  0   Are you worried about falling 0       Learning Assessment 1/24/2018   PRIMARY LEARNER Patient   HIGHEST LEVEL OF EDUCATION - PRIMARY LEARNER  4 YEARS OF COLLEGE   BARRIERS PRIMARY LEARNER -   CO-LEARNER CAREGIVER -   PRIMARY LANGUAGE ENGLISH   LEARNER PREFERENCE PRIMARY PICTURES   ANSWERED BY PATIENT   RELATIONSHIP SELF

## 2022-07-18 NOTE — LETTER
CONTROLLED SUBSTANCE MEDICATION AGREEMENT  Patient Name: Marlon Pires  Patient YOB: 1965     I understand, that controlled substance medications may be used to help better manage my symptoms and to improve my ability to function at home, work and in social settings. However, I also understand that these medications do have risks, which have been discussed with me, including possible development of physical or psychological dependence. I understand that successful treatment requires mutual trust and honesty between me and my provider. I understand and agree that following this Medication Agreement is necessary in continuing my provider-patient relationship and the success of my treatment plan. Explanation from my Provider: Benefits and Goals of Controlled Substance Medications: There are two potential goals for your treatment: (1) decreased pain and suffering (2) improved daily life functions. There are many possible treatments for your chronic condition(s). Alternatives such as physical therapy, yoga, massage, home daily exercise, meditation, relaxation techniques, injections, chiropractic manipulations, surgery, cognitive therapy, hypnosis and many medications that are not habit-forming may be used. Use of controlled substance medications may be helpful, but they are unlikely to resolve all symptoms or restore all function. Explanation from my Provider: Risks of Controlled Substance Medications:   Opioid pain medications: These medications can lead to problems such as addiction/dependence, sedation, lightheadedness/dizziness, memory issues, falls, constipation, nausea, or vomiting. They may also impair the ability to drive or operate machinery. Additionally, these medications may lower testosterone levels, leading to loss of bone strength, stamina and sex drive.   They may cause problems with breathing, sleep apnea and reduced coughing, which is especially dangerous for patients with lung disease. Overdose or dangerous interactions with alcohol and other medications may occur, leading to death. Hyperalgesia may develop, which means patients receiving opioids for the treatment of pain may become more sensitive to certain painful stimuli, and in some cases, experience pain from ordinarily non-painful stimuli. Women between the ages of 14-53 who could become pregnant should carefully weigh the risks and benefits of opioids with their physicians, as these medications increase the risk of pregnancy complications, including miscarriage,  delivery and stillbirth. It is also possible for babies to be born addicted to opioids. Opioid dependence withdrawal symptoms may include; feelings of uneasiness, increased pain, irritability, belly pain, diarrhea, sweats and goose-flesh. Testosterone replacement therapy:  Potential side effects include increased risk of stroke and heart attack, blood clots, increased blood pressure, increased cholesterol, enlarged prostate, sleep apnea, irritability/aggression and other mood disorders, and decreased fertility. Juli Coe (1965)             Page 1 of 4    Initials:_______    Benzodiazepines and non-benzodiazepine sleep medications: These medications can lead to problems such as addiction/dependence, sedation, fatigue, lightheadedness, dizziness, incoordination, falls, depression, hallucinations, and impaired judgment, memory and concentration. The ability to drive and operate machinery may also be affected. Abnormal sleep-related behaviors have been reported, including sleepwalking, driving, making telephone calls, eating, or having sex while not fully awake. These medications can suppress breathing and worsen sleep apnea, particularly when combined with alcohol or other sedating medications, potentially leading to death.  Dependence withdrawal symptoms may include tremors, anxiety, hallucinations and seizures. Stimulants:  Common adverse effects include addiction/dependence, increased blood pressure and heart rate, decreased appetite, nausea, involuntary weight loss, insomnia,  irritability, and headaches. These risks may increase when these medications are combined with other stimulants, such as caffeine pills or energy drinks, certain weight loss supplements and oral decongestants. Dependence withdrawal symptoms may include depressed mood, loss of interest, suicidal thoughts, anxiety, fatigue, appetite changes and agitation. I agree and understand that I and my prescriber have the following rights and responsibilities regarding my treatment plan:   1. MY RIGHTS:  To be informed of my treatment and medication plan. To be an active participant in my health and wellbeing. 2. MY RESPONSIBILITY AND UNDERSTANDING FOR USE OF MEDICATIONS   I will take medications at the dose and frequency as directed. For my safety, I will not increase or change how I take my medications without the recommendation of my healthcare provider.  I will actively participate in any program recommended by my provider which may improve function, including social, physical, psychological programs.  I will not take my medications with alcohol or other drugs not prescribed to me. I understand that drinking alcohol with my medications increases the chances of side effects, including reduced breathing rate and could lead to personal injury when operating machinery.  I understand that if I have a history of substance use disorders, including alcohol or other illicit drugs, that I may be at increased risk of addiction to my medications.  I agree to notify my provider immediately if I should become pregnant so that my treatment plan can be adjusted.    I agree and understand that I shall only receive controlled substance medications from the prescriber that signed this agreement unless there is written agreement among other prescribers of controlled substances outlining the responsibility of the medications being prescribed.  I understand that the if the controlled medication is not helping to achieve goals, the dosage may be tapered and no longer prescribed. 3. MY RESPONSIBILITY FOR COMMUNICATION / PRESCRIPTION RENEWALS   I agree that all controlled substance medications that I take will be prescribed only by my provider. If another healthcare provider prescribes me medication in an emergency, I will notify my provider within seventy-two (72) hours. Marronald Botello (1965)             Page 2 of 4    Initials:_______   I will arrange for refills at the prescribed interval ONLY during regular office hours. I will not ask for refills earlier than agreed, after-hours, on holidays or weekends. Refills may take up to 72 hours for processing and prescriptions to reach the pharmacy.  I will inform my other health care providers that I am taking these medications and of the existence of this [id number]. In the event of an emergency, I will provide the same information to the emergency department prescribers.  I will keep my provider updated on the pharmacy I am using for controlled medication prescription filling. 4. MY RESPONSIBILITY FOR PROTECTING MEDICATIONS   I will protect my prescriptions and medications. I understand that lost or misplaced prescriptions will not be replaced.  I will keep medications only for my own use and will not share them with others. I will keep all medications away from children.  I agree that if my medications are adjusted or discontinued, I will properly dispose of any remaining medications. I understand that I will be required to dispose of any remaining controlled medications as, directed by my prescriber, prior to being provided with any prescriptions for other controlled medications.   Medication drop box locations can be found at: [email or web address]  5. MY RESPONSIBILITY WITH ILLEGAL DRUGS    I will not use illegal or street drugs or another person's prescription medications not prescribed to me.  If there are identified addiction type symptoms, then referral to a program may be provided by my provider and I agree to follow through with this recommendation. 6. MY RESPONSIBILITY FOR COOPERATION WITH INVESTIGATIONS   I understand that my provider will comply with any applicable law and may discuss my use and/or possible misuse/abuse of controlled substances and alcohol, as appropriate, with any health care provider involved in my care, pharmacist, or legal authority.  I authorize my provider and pharmacy to cooperate fully with law enforcement agencies (as permitted by law) in the investigation of any possible misuse, sale, or other diversion of my controlled substances.  I agree to waive any applicable privilege or right of privacy or confidentiality with respect to these authorizations. 7. PROVIDERS RIGHT TO MONITOR FOR SAFETY: PRESCRIPTION MONITORING / DRUG TESTING   I consent to drug/toxicology screening and will submit to a drug screen upon my providers request to assure I am only taking the prescribed drugs for my safety monitoring. I understand that a drug screen is a laboratory test in which a sample of my urine, blood or saliva is checked to see what drugs I have been taking. This may entail an observed urine specimen, which means that a nurse or other health care provider may watch me provide urine, and I will cooperate if I am asked to provide an observed specimen. Corey Davenport (1965)             Page 3 of 4    Initials:_______  Fannie Armas I understand that my provider will check a copy of my State Prescription Monitoring Program () Report in order to safely prescribe medications.      Pill Counts: I consent to pill counts when requested. I may be asked to bring all my prescribed controlled substance medications, in their original bottles, to all of my scheduled appointments. In addition, my provider may ask me to come to the practice at any time for a random pill count. 8. TERMINATION OF THIS AGREEMENT   For my safety, my prescriber has the right to stop prescribing controlled substance medications and may end this agreement.  Conditions that may result in termination of this agreement:  a. I do not show any improvement in pain, or my activity has not improved. b. I develop rapid tolerance or loss of improvement, as described in my treatment plan.  c. I develop significant side effects from the medication. d. My behavior is not consistent with the responsibilities outlined above, thereby causing safety concerns to continue prescribing controlled substance medications. e. I fail to follow the terms of this agreement. f. Other:____________________________     UNDERSTANDING THIS MEDICATION AGREEMENT:    I have read the above and have had all my questions answered. For chronic disease management, I know that my symptoms can be managed with many types of treatments. A chronic medication trial may be part of my treatment, but I must be an active participant in my care. Medication therapy is only one part of my symptom management plan. In some cases, there may be limited scientific evidence to support the chronic use of certain medications to improve symptoms and daily function. Furthermore, in certain circumstances, there may be scientific information that suggests that the use of chronic controlled substances may worsen my symptoms and increase my risk of unintentional death directly related to this medication therapy.   I know that if my provider feels my risk from controlled medications is greater than my benefit, I will have my controlled substance medication(s) compassionately lowered or removed altogether. I further agree to allow this office to contact my HIPAA contact if there are concerns about my safety and use of the controlled medications. I have agreed to use the prescribed controlled substance medications to me as instructed by my provider and as stated in this Medication Agreement. My initial on each page and my signature below shows that I have read each page and I have had the opportunity to ask questions with answers provided by my provider.       Patient Name (Printed): _____________________________________    Patient Signature:  ______________________   Date: _____________      Prescriber Name (Printed): ___________________________________    Prescriber Signature: _____________________  Date: _____________     Juli Johnson (1965)             Page 4 of 4

## 2022-08-18 DIAGNOSIS — F51.01 PRIMARY INSOMNIA: ICD-10-CM

## 2022-08-19 RX ORDER — ZOLPIDEM TARTRATE 6.25 MG/1
6.25 TABLET, FILM COATED, EXTENDED RELEASE ORAL
Qty: 90 TABLET | Refills: 0 | Status: SHIPPED | OUTPATIENT
Start: 2022-08-19

## 2022-10-14 DIAGNOSIS — K21.9 GASTROESOPHAGEAL REFLUX DISEASE WITHOUT ESOPHAGITIS: ICD-10-CM

## 2022-10-14 RX ORDER — PANTOPRAZOLE SODIUM 40 MG/1
TABLET, DELAYED RELEASE ORAL
Qty: 90 TABLET | Refills: 3 | Status: SHIPPED | OUTPATIENT
Start: 2022-10-14

## 2022-10-14 RX ORDER — METOPROLOL SUCCINATE 25 MG/1
TABLET, EXTENDED RELEASE ORAL
Qty: 45 TABLET | Refills: 3 | Status: SHIPPED | OUTPATIENT
Start: 2022-10-14

## 2022-11-15 DIAGNOSIS — F51.01 PRIMARY INSOMNIA: ICD-10-CM

## 2022-11-15 RX ORDER — ZOLPIDEM TARTRATE 6.25 MG/1
6.25 TABLET, FILM COATED, EXTENDED RELEASE ORAL
Qty: 90 TABLET | Refills: 0 | Status: SHIPPED | OUTPATIENT
Start: 2022-11-15

## 2022-11-15 RX ORDER — ZOLPIDEM TARTRATE 6.25 MG/1
6.25 TABLET, FILM COATED, EXTENDED RELEASE ORAL
Qty: 30 TABLET | Refills: 2 | Status: SHIPPED | OUTPATIENT
Start: 2022-11-15

## 2023-01-13 RX ORDER — VENLAFAXINE HYDROCHLORIDE 75 MG/1
CAPSULE, EXTENDED RELEASE ORAL
Qty: 90 CAPSULE | Refills: 3 | Status: SHIPPED | OUTPATIENT
Start: 2023-01-13

## 2023-01-27 ENCOUNTER — OFFICE VISIT (OUTPATIENT)
Dept: FAMILY MEDICINE CLINIC | Age: 58
End: 2023-01-27

## 2023-01-27 VITALS
DIASTOLIC BLOOD PRESSURE: 81 MMHG | BODY MASS INDEX: 39.7 KG/M2 | WEIGHT: 202.2 LBS | TEMPERATURE: 98.3 F | HEIGHT: 60 IN | OXYGEN SATURATION: 95 % | HEART RATE: 74 BPM | SYSTOLIC BLOOD PRESSURE: 139 MMHG

## 2023-01-27 DIAGNOSIS — I10 ESSENTIAL HYPERTENSION: Primary | ICD-10-CM

## 2023-01-27 DIAGNOSIS — E78.00 HYPERCHOLESTEROLEMIA: ICD-10-CM

## 2023-01-27 DIAGNOSIS — M79.7 FIBROMYALGIA: ICD-10-CM

## 2023-01-27 DIAGNOSIS — K21.9 GASTROESOPHAGEAL REFLUX DISEASE WITHOUT ESOPHAGITIS: ICD-10-CM

## 2023-01-27 DIAGNOSIS — N95.1 HOT FLASHES DUE TO MENOPAUSE: ICD-10-CM

## 2023-01-27 DIAGNOSIS — M47.816 LUMBAR ARTHROPATHY: ICD-10-CM

## 2023-01-27 DIAGNOSIS — G89.29 CHRONIC MID BACK PAIN: ICD-10-CM

## 2023-01-27 DIAGNOSIS — Z51.81 ENCOUNTER FOR MEDICATION MONITORING: ICD-10-CM

## 2023-01-27 DIAGNOSIS — R73.02 IGT (IMPAIRED GLUCOSE TOLERANCE): ICD-10-CM

## 2023-01-27 DIAGNOSIS — M54.9 CHRONIC MID BACK PAIN: ICD-10-CM

## 2023-01-27 DIAGNOSIS — F43.22 ADJUSTMENT DISORDER WITH ANXIETY: ICD-10-CM

## 2023-01-27 RX ORDER — VENLAFAXINE HYDROCHLORIDE 75 MG/1
150 CAPSULE, EXTENDED RELEASE ORAL DAILY
Qty: 180 CAPSULE | Refills: 3 | Status: SHIPPED | OUTPATIENT
Start: 2023-01-27 | End: 2023-01-27 | Stop reason: SDUPTHER

## 2023-01-27 RX ORDER — VENLAFAXINE HYDROCHLORIDE 75 MG/1
150 CAPSULE, EXTENDED RELEASE ORAL DAILY
Qty: 180 CAPSULE | Refills: 3 | Status: SHIPPED | OUTPATIENT
Start: 2023-01-27

## 2023-01-27 NOTE — PROGRESS NOTES
Chief Complaint   Patient presents with    Follow-up     5 month follow up- still has the hot flashes      1. \"Have you been to the ER, urgent care clinic since your last visit? Hospitalized since your last visit? \" No    2. \"Have you seen or consulted any other health care providers outside of the [address] [place] [address] since your last visit? \" No     3. For patients aged 39-70: Has the patient had a colonoscopy / FIT/ Cologuard? No      If the patient is female:    4. For patients aged 41-77: Has the patient had a mammogram within the past 2 years? No had double mastectomy      5. For patients aged 21-65: Has the patient had a pap smear?  No had a hysterectomy

## 2023-01-27 NOTE — PROGRESS NOTES
HISTORY OF PRESENT ILLNESS  Josue Banks is a 65 y.o. female. HPI   Follow up on chronic medical problems. Cardiovascular Review:  The patient has hypertension. Diet and Lifestyle: generally follows a low fat low cholesterol diet, generally follows a low sodium diet, exercises sporadically  Home BP Monitoring: is not measured at home. Pertinent ROS: taking medications as instructed, no medication side effects noted, no TIA's, no chest pain on exertion, no dyspnea on exertion, no swelling of ankles. Anxiety Review:  Patient is seen for ADD, sleep disturbance and anxiety. Current treatment includes Xanax prn and Effexor. Effexor initially was helping with hot flashes but recently has had increased hot flashes. .    Anxiety has been improved. No SI/HI    Ongoing symptoms include: insomnia. She is using [place] for sleep. Reported side effects from the treatment: none. Patient Active Problem List   Diagnosis Code    Palpitations R00.2    Depression F32. A    Environmental allergies Z91.09    GERD (gastroesophageal reflux disease) K21.9    Insomnia G47.00    Essential hypertension I10    ADD (attention deficit disorder) F98.8    Encounter for medication monitoring Z51.81    Severe obesity (BMI 35.0-39. 9) with comorbidity (HCC) E66.01    History of bilateral mastectomy Z90.13       Current Outpatient Medications   Medication Sig Dispense Refill    venlafaxine-SR (EFFEXOR-XR) 75 mg capsule TAKE 1 CAPSULE BY MOUTH EVERY DAY 90 Capsule 3    zolpidem CR (AMBIEN CR) 6.25 mg tablet TAKE 1 TABLET BY MOUTH NIGHTLY AS NEEDED FOR SLEEP.  MAX DAILY AMOUNT: 6.25 MG. 30 Tablet 2    metoprolol succinate (TOPROL-XL) 25 mg XL tablet TAKE 1/2 TABLET BY MOUTH NIGHTLY 45 Tablet 3    pantoprazole (PROTONIX) 40 mg tablet TAKE 1 TABLET BY MOUTH EVERY DAY 90 Tablet 3    celecoxib (CELEBREX) 200 mg capsule TAKE 1 CAPSULE BY MOUTH TWICE A  Capsule 3    cyclobenzaprine (FLEXERIL) 5 mg tablet Take 5 mg by mouth three (3) times daily as needed. multivit-min/ferrous fumarate (MULTI VITAMIN PO) Take 1 Tablet by mouth daily. b complex vitamins tablet Take 1 Tablet by mouth daily. calcium-cholecalciferol, D3, (CALTRATE 600+D) tablet Take 1 Tablet by mouth daily. ALPRAZolam (XANAX) 0.25 mg tablet Take 1.5 Tablets by mouth daily as needed for Anxiety. 45 Tablet 1    albuterol sulfate (ProAir RespiClick) 90 mcg/actuation breath activated inhaler Take 2 Puffs by inhalation every four (4) hours as needed for Wheezing. 1 Inhaler 11    EPINEPHrine (EPIPEN) 0.3 mg/0.3 mL injection USE AS DIRECTED FOR ALLERGIC REACTION 2 Syringe 1    mometasone (NASONEX) 50 mcg/actuation nasal spray 2 Sprays by Both Nostrils route daily as needed. PREVIDENT 5000 BOOSTER PLUS 1.1 % pste USE ONCE A DAY, PREFERABLY AT BEDTIME  4    desonide (TRIDESILON) 0.05 % topical lotion Apply  to affected area two (2) times daily as needed for Skin Irritation. 59 mL 0    diphenhydrAMINE (BENADRYL) 25 mg capsule Take 50 mg by mouth every six (6) hours as needed. Indications: ALLERGIC REACTIONS      cetirizine (ZYRTEC) 10 mg tablet Take 10 mg by mouth daily as needed. zolpidem CR (AMBIEN CR) 6.25 mg tablet Take 1 Tablet by mouth nightly as needed for Sleep. Max Daily Amount: 6.25 mg. (Patient not taking: Reported on 1/27/2023) 90 Tablet 0       Allergies   Allergen Reactions    Other Food Other (comments)     PT STATES OTHER FOOD ALLERGIES WITH VARYING REACTIONS:  PORK, LETTUCE, LIMA BEANS, BLACK PEPPER, TEA, MALT, WALNUTS.     Beef Containing Products Anaphylaxis    Food Extracts Anaphylaxis     Beef, milk, peanuts    Milk Anaphylaxis    Peanut Anaphylaxis    Alpha-D-Galactosidase Diarrhea    Azithromycin Nausea and Vomiting    Erythromycin Nausea and Vomiting    Guaifenesin Nausea and Vomiting    Morphine Nausea and Vomiting     Projectile vomiting per patient    Other Medication Other (comments)     WHITE CELL STIMULATOR INJECTION (NEULASTIN?) CAUSED SEVERE BONE PAIN LASTING A COUPLE WEEKS. Sulfa (Sulfonamide Antibiotics) Other (comments)     \"causes projectile vomiting\"           Past Medical History:   Diagnosis Date    Anemia 10/3/13    \"SINCE I WAS A KID\"    Anxiety     DURING CHEMO; PT STAYS XANAX AS OF 10/3/13    Cancer ([facility] [address] [phone]    breast,chemo ended 10/2012    Chronic pain     si joints sacrum    Depression     Fibromyalgia     Nausea & vomiting     used patch in past with good results    Other ill-defined conditions(799.89)     anemia,chronic diarrhea, TMJ    Ovarian cyst     Palpitations     TAKES METOPROLOL FOR TACHYCARDIA    PUD (peptic ulcer disease)     occurred at age 33 .     Unspecified adverse effect of anesthesia     TMJ           Past Surgical History:   Procedure Laterality Date    HX BREAST RECONSTRUCTION  7/10/2013    BREAST NIPPLE RECONSTRUCTION REVISION performed by Adan Lewis MD at [facility] MAIN OR    HX BREAST RECONSTRUCTION  2012    REINCISION LEFT BREAST FOR REMAINDER OF BREAST CA    HX BREAST RECONSTRUCTION  2012    R SEROMA EXCISED, REPLACE IMPLANT R BREAST    HX  SECTION      HX ENDOSCOPY      [facility].    HX GI      anal fissure repair    HX GYN  10/21/13    [facility]/BSO    HX HYSTERECTOMY  10/2013    Dr. Natasha Boswell MASTECTOMY Bilateral 2012    double    HX OTHER SURGICAL      DENTAL SURGERIES WITH SEDATION    HX VASCULAR ACCESS  2012    PORTACATH INSERTION    HX VASCULAR ACCESS  2012    [facility] REMOVAL    NE COLONOSCOPY FLX DX W/COLLJ SPEC WHEN PFRMD  2011    Dr Leonora Ortega FFD EXC DISC REEXPL 1 [address] CERVICAL  2010    NE UNLISTED PROCEDURE BREAST      breast biopsy x3 left breast           Problem  Relation  Age of Onset    Breast Cancer  Mother  64    Cancer  Mother  64       OF LUNG CA    Hypertension  Father    Diabetes  Father    Emphysema  Father    COPD  Father    Heart Attack  Sister  50    Heart Disease  Sister    Breast Cancer  Sister    No Known Problems  Brother    Anxiety  Brother    No Known Problems  Sister    Breast Cancer  Paternal Grandmother    Malignant Hyperthermia  Neg Hx    Pseudocholinesterase Deficiency  Neg Hx    Delayed Awakening  Neg Hx    Post-op Nausea/Vomiting  Neg Hx    Emergence Delirium  Neg Hx    Post-op Cognitive Dysfunction  Neg Hx    Other  Neg Hx       Social History     Tobacco Use    Smoking status: Never    Smokeless tobacco: Never   Substance Use Topics    Alcohol use: No     Alcohol/week: 0.0 standard drinks        Lab Results   Component Value Date/Time    WBC 7.4 04/07/2021 02:40 PM    HGB 12.8 04/07/2021 02:40 PM    HCT 39.9 04/07/2021 02:40 PM    PLATELET 939 14/89/2253 02:40 PM    MCV 83.3 04/07/2021 02:40 PM     Lab Results   Component Value Date/Time    Cholesterol, total 210 (H) 12/22/2021 03:51 PM    HDL Cholesterol 41 12/22/2021 03:51 PM    LDL, calculated 146.6 (H) 12/22/2021 03:51 PM    LDL-C, External 139 05/21/2015 01:33 PM    Triglyceride 112 12/22/2021 03:51 PM    CHOL/HDL Ratio 5.1 (H) 12/22/2021 03:51 PM     Lab Results   Component Value Date/Time    TSH 1.34 04/07/2021 02:40 PM    T4, Free 1.00 06/28/2019 11:53 AM      Lab Results   Component Value Date/Time    Sodium 139 12/22/2021 03:51 PM    Potassium 3.8 12/22/2021 03:51 PM    Chloride 105 12/22/2021 03:51 PM    CO2 27 12/22/2021 03:51 PM    Anion gap 7 12/22/2021 03:51 PM    Glucose 103 (H) 12/22/2021 03:51 PM    BUN 10 12/22/2021 03:51 PM    Creatinine 0.70 12/22/2021 03:51 PM    BUN/Creatinine ratio 14 12/22/2021 03:51 PM    GFR est AA >60 12/22/2021 03:51 PM    GFR est non-AA >60 12/22/2021 03:51 PM    Calcium 9.2 12/22/2021 03:51 PM    Bilirubin, total 0.5 04/07/2021 02:40 PM    ALT (SGPT) 48 04/07/2021 02:40 PM    Alk.  phosphatase 115 04/07/2021 02:40 PM    Protein, total 6.9 04/07/2021 02:40 PM    Albumin 3.6 04/07/2021 02:40 PM    Globulin 3.3 04/07/2021 02:40 PM    A-G Ratio 1.1 04/07/2021 02:40 PM      Lab Results Component Value Date/Time    Hemoglobin A1c 5.6 12/22/2021 03:51 PM    Hemoglobin A1c (POC) 6.0 04/22/2019 09:50 AM    Hemoglobin A1c, External 5.7 05/21/2015 01:33 PM         Review of Systems   Constitutional:  Negative for malaise/fatigue. HENT:  Negative for congestion. Eyes:  Negative for blurred vision. Respiratory:  Negative for cough and shortness of breath. Cardiovascular:  Negative for chest pain, palpitations and leg swelling. Gastrointestinal:  Negative for abdominal pain, constipation and heartburn. Genitourinary:  Negative for dysuria, frequency and urgency. Musculoskeletal:  Negative for back pain and joint pain. Neurological:  Negative for dizziness, tingling and headaches. Endo/Heme/Allergies:  Negative for environmental allergies. Psychiatric/Behavioral:  Negative for depression. The patient does not have insomnia. Physical Exam  Vitals and nursing note reviewed. Constitutional:       Appearance: Normal appearance. She is well-developed. Comments: /81 (BP 1 Location: Right upper arm, BP Patient Position: Sitting, BP Cuff Size: Large adult)   Pulse 74   Temp 98.3 °F (36.8 °C) (Oral)   Ht 5' (1.524 m)   Wt 202 lb 3.2 oz (91.7 kg)   LMP 08/03/2012   SpO2 95%   BMI 39.49 kg/m²    HENT:      Right Ear: Tympanic membrane and ear canal normal.      Left Ear: Tympanic membrane and ear canal normal.   Neck:      Thyroid: No thyromegaly. Cardiovascular:      Rate and Rhythm: Normal rate and regular rhythm. Heart sounds: Normal heart sounds. Pulmonary:      Effort: Pulmonary effort is normal.      Breath sounds: Normal breath sounds. Abdominal:      General: Bowel sounds are normal.      Palpations: Abdomen is soft. There is no mass. Tenderness: There is no abdominal tenderness. Musculoskeletal:         General: Normal range of motion. Cervical back: Normal range of motion and neck supple. Right lower leg: No edema. Left lower leg: No edema. Lymphadenopathy:      Cervical: No cervical adenopathy. Skin:     General: Skin is warm and dry. Neurological:      General: No focal deficit present. Mental Status: She is alert and oriented to person, place, and time. Psychiatric:         Mood and Affect: Mood normal.       ASSESSMENT and PLAN  Diagnoses and all orders for this visit:    1. Essential hypertension  Discussed sodium restriction, high k rich diet, maintaining ideal body weight and regular exercise program such as daily walking 30 min perday 4-5 times per week, as physiologic means to achieve blood pressure control. Medication compliance advised. 2. Hypercholesterolemia  Continue to monitor. Work on diet and exercise. 3. IGT (impaired glucose tolerance)  Continue to monitor. Work on diet and exercise. 4. Gastroesophageal reflux disease without esophagitis  Stable on protonix    5. Adjustment disorder with anxiety  -     venlafaxine-SR (EFFEXOR-XR) 75 mg capsule; Take 2 Capsules by mouth daily. 6. Hot flashes due to menopause  -     increase venlafaxine-SR (EFFEXOR-XR) 75 mg capsule; Take 2 Capsules by mouth daily. 7. Chronic mid back pain  8. Lumbar arthropathy  9. Fibromyalgia  Overall stable. 10. Encounter for medication monitoring  Wanted to wait on getting her labs done until she insurance again. Follow-up and Dispositions    Return in about 6 months (around 7/27/2023). reviewed diet, exercise and weight control  cardiovascular risk and specific lipid/LDL goals reviewed  reviewed medications and side effects in detail  specific diabetic recommendations: low cholesterol diet, weight control and daily exercise discussed and glycohemoglobin and other lab monitoring discussed    I have discussed diagnosis listed in this note with pt and/or family.  I have discussed treatment plans and options and the risk/benefit analysis of those options, including safe use of medications and possible medication side effects. Through the use of shared decision making we have agreed to the above plan. The patient has received an after-visit summary and questions were answered concerning future plans and follow up. Advise pt of any urgent changes then to proceed to the ER.

## 2023-01-27 NOTE — LETTER
CONTROLLED SUBSTANCE MEDICATION AGREEMENT  Patient Name: Elías Cantu  Patient YOB: 1965     I understand, that controlled substance medications may be used to help better manage my symptoms and to improve my ability to function at home, work and in social settings. However, I also understand that these medications do have risks, which have been discussed with me, including possible development of physical or psychological dependence. I understand that successful treatment requires mutual trust and honesty between me and my provider. I understand and agree that following this Medication Agreement is necessary in continuing my provider-patient relationship and the success of my treatment plan. Explanation from my Provider: Benefits and Goals of Controlled Substance Medications: There are two potential goals for your treatment: (1) decreased pain and suffering (2) improved daily life functions. There are many possible treatments for your chronic condition(s). Alternatives such as physical therapy, yoga, massage, home daily exercise, meditation, relaxation techniques, injections, chiropractic manipulations, surgery, cognitive therapy, hypnosis and many medications that are not habit-forming may be used. Use of controlled substance medications may be helpful, but they are unlikely to resolve all symptoms or restore all function. Explanation from my Provider: Risks of Controlled Substance Medications:   Opioid pain medications: These medications can lead to problems such as addiction/dependence, sedation, lightheadedness/dizziness, memory issues, falls, constipation, nausea, or vomiting. They may also impair the ability to drive or operate machinery. Additionally, these medications may lower testosterone levels, leading to loss of bone strength, stamina and sex drive.   They may cause problems with breathing, sleep apnea and reduced coughing, which is especially dangerous for patients with lung disease. Overdose or dangerous interactions with alcohol and other medications may occur, leading to death. Hyperalgesia may develop, which means patients receiving opioids for the treatment of pain may become more sensitive to certain painful stimuli, and in some cases, experience pain from ordinarily non-painful stimuli. Women between the ages of 14-53 who could become pregnant should carefully weigh the risks and benefits of opioids with their physicians, as these medications increase the risk of pregnancy complications, including miscarriage,  delivery and stillbirth. It is also possible for babies to be born addicted to opioids. Opioid dependence withdrawal symptoms may include; feelings of uneasiness, increased pain, irritability, belly pain, diarrhea, sweats and goose-flesh. Testosterone replacement therapy:  Potential side effects include increased risk of stroke and heart attack, blood clots, increased blood pressure, increased cholesterol, enlarged prostate, sleep apnea, irritability/aggression and other mood disorders, and decreased fertility. Mindy West (1965)             Page 1 of 4    Initials:_______    Benzodiazepines and non-benzodiazepine sleep medications: These medications can lead to problems such as addiction/dependence, sedation, fatigue, lightheadedness, dizziness, incoordination, falls, depression, hallucinations, and impaired judgment, memory and concentration. The ability to drive and operate machinery may also be affected. Abnormal sleep-related behaviors have been reported, including sleepwalking, driving, making telephone calls, eating, or having sex while not fully awake. These medications can suppress breathing and worsen sleep apnea, particularly when combined with alcohol or other sedating medications, potentially leading to death.  Dependence withdrawal symptoms may include tremors, anxiety, hallucinations and seizures. Stimulants:  Common adverse effects include addiction/dependence, increased blood pressure and heart rate, decreased appetite, nausea, involuntary weight loss, insomnia,  irritability, and headaches. These risks may increase when these medications are combined with other stimulants, such as caffeine pills or energy drinks, certain weight loss supplements and oral decongestants. Dependence withdrawal symptoms may include depressed mood, loss of interest, suicidal thoughts, anxiety, fatigue, appetite changes and agitation. I agree and understand that I and my prescriber have the following rights and responsibilities regarding my treatment plan:   1. MY RIGHTS:  To be informed of my treatment and medication plan. To be an active participant in my health and wellbeing. 2. MY RESPONSIBILITY AND UNDERSTANDING FOR USE OF MEDICATIONS   I will take medications at the dose and frequency as directed. For my safety, I will not increase or change how I take my medications without the recommendation of my healthcare provider.  I will actively participate in any program recommended by my provider which may improve function, including social, physical, psychological programs.  I will not take my medications with alcohol or other drugs not prescribed to me. I understand that drinking alcohol with my medications increases the chances of side effects, including reduced breathing rate and could lead to personal injury when operating machinery.  I understand that if I have a history of substance use disorders, including alcohol or other illicit drugs, that I may be at increased risk of addiction to my medications.  I agree to notify my provider immediately if I should become pregnant so that my treatment plan can be adjusted.    I agree and understand that I shall only receive controlled substance medications from the prescriber that signed this agreement unless there is written agreement among other prescribers of controlled substances outlining the responsibility of the medications being prescribed.  I understand that the if the controlled medication is not helping to achieve goals, the dosage may be tapered and no longer prescribed. 3. MY RESPONSIBILITY FOR COMMUNICATION / PRESCRIPTION RENEWALS   I agree that all controlled substance medications that I take will be prescribed only by my provider. If another healthcare provider prescribes me medication in an emergency, I will notify my provider within seventy-two (72) hours. Tha Garcia (1965)             Page 2 of 4    Initials:_______   I will arrange for refills at the prescribed interval ONLY during regular office hours. I will not ask for refills earlier than agreed, after-hours, on holidays or weekends. Refills may take up to 72 hours for processing and prescriptions to reach the pharmacy.  I will inform my other health care providers that I am taking these medications and of the existence of this [id number]. In the event of an emergency, I will provide the same information to the emergency department prescribers.  I will keep my provider updated on the pharmacy I am using for controlled medication prescription filling. 4. MY RESPONSIBILITY FOR PROTECTING MEDICATIONS   I will protect my prescriptions and medications. I understand that lost or misplaced prescriptions will not be replaced.  I will keep medications only for my own use and will not share them with others. I will keep all medications away from children.  I agree that if my medications are adjusted or discontinued, I will properly dispose of any remaining medications. I understand that I will be required to dispose of any remaining controlled medications as, directed by my prescriber, prior to being provided with any prescriptions for other controlled medications.   Medication drop box locations can be found at: [employer]  5. MY RESPONSIBILITY WITH ILLEGAL DRUGS    I will not use illegal or street drugs or another person's prescription medications not prescribed to me.  If there are identified addiction type symptoms, then referral to a program may be provided by my provider and I agree to follow through with this recommendation. 6. MY RESPONSIBILITY FOR COOPERATION WITH INVESTIGATIONS   I understand that my provider will comply with any applicable law and may discuss my use and/or possible misuse/abuse of controlled substances and alcohol, as appropriate, with any health care provider involved in my care, pharmacist, or legal authority.  I authorize my provider and pharmacy to cooperate fully with law enforcement agencies (as permitted by law) in the investigation of any possible misuse, sale, or other diversion of my controlled substances.  I agree to waive any applicable privilege or right of privacy or confidentiality with respect to these authorizations. 7. PROVIDERS RIGHT TO MONITOR FOR SAFETY: PRESCRIPTION MONITORING / DRUG TESTING   I consent to drug/toxicology screening and will submit to a drug screen upon my providers request to assure I am only taking the prescribed drugs for my safety monitoring. I understand that a drug screen is a laboratory test in which a sample of my urine, blood or saliva is checked to see what drugs I have been taking. This may entail an observed urine specimen, which means that a nurse or other health care provider may watch me provide urine, and I will cooperate if I am asked to provide an observed specimen. Alok Gonzalez (1965)             Page 3 of 4    Initials:_______  Mary Douglas I understand that my provider will check a copy of my State Prescription Monitoring Program () Report in order to safely prescribe medications.      Pill Counts: I consent to pill counts when requested. I may be asked to bring all my prescribed controlled substance medications, in their original bottles, to all of my scheduled appointments. In addition, my provider may ask me to come to the practice at any time for a random pill count. 8. TERMINATION OF THIS AGREEMENT   For my safety, my prescriber has the right to stop prescribing controlled substance medications and may end this agreement.  Conditions that may result in termination of this agreement:  a. I do not show any improvement in pain, or my activity has not improved. b. I develop rapid tolerance or loss of improvement, as described in my treatment plan.  c. I develop significant side effects from the medication. d. My behavior is not consistent with the responsibilities outlined above, thereby causing safety concerns to continue prescribing controlled substance medications. e. I fail to follow the terms of this agreement. f. Other:____________________________     UNDERSTANDING THIS MEDICATION AGREEMENT:    I have read the above and have had all my questions answered. For chronic disease management, I know that my symptoms can be managed with many types of treatments. A chronic medication trial may be part of my treatment, but I must be an active participant in my care. Medication therapy is only one part of my symptom management plan. In some cases, there may be limited scientific evidence to support the chronic use of certain medications to improve symptoms and daily function. Furthermore, in certain circumstances, there may be scientific information that suggests that the use of chronic controlled substances may worsen my symptoms and increase my risk of unintentional death directly related to this medication therapy.   I know that if my provider feels my risk from controlled medications is greater than my benefit, I will have my controlled substance medication(s) compassionately lowered or removed altogether. I further agree to allow this office to contact my HIPAA contact if there are concerns about my safety and use of the controlled medications. I have agreed to use the prescribed controlled substance medications to me as instructed by my provider and as stated in this Medication Agreement. My initial on each page and my signature below shows that I have read each page and I have had the opportunity to ask questions with answers provided by my provider.       Patient Name (Printed): _____________________________________    Patient Signature:  ______________________   Date: _____________      Prescriber Name (Printed): ___________________________________    Prescriber Signature: _____________________  Date: _____________     Lizzie Master (1965)             Page 4 of 4

## 2023-03-15 DIAGNOSIS — F51.01 PRIMARY INSOMNIA: ICD-10-CM

## 2023-03-15 NOTE — TELEPHONE ENCOUNTER
Patient is requesting a 90 d/s    Last Visit: 1/27/23 with MD Almodovar See  Next Appointment: 7/28/23 with MD Almodovar See  Previous Refill Encounter(s): 11/15/22 #30 with 2 refills    Requested Prescriptions     Pending Prescriptions Disp Refills    zolpidem CR (AMBIEN CR) 6.25 mg tablet 90 Tablet 1     Sig: Take 1 Tablet by mouth nightly as needed for Sleep. Max Daily Amount: 6.25 mg. For Pharmacy Admin Tracking Only    Program: Medication Refill  CPA in place:   Recommendation Provided To:    Intervention Detail: New Rx: 1, reason: Patient Preference  Intervention Accepted By:   Josette Sterling Closed?:   Time Spent (min): 5

## 2023-03-16 RX ORDER — ZOLPIDEM TARTRATE 6.25 MG/1
6.25 TABLET, FILM COATED, EXTENDED RELEASE ORAL
Qty: 90 TABLET | Refills: 0 | Status: SHIPPED | OUTPATIENT
Start: 2023-03-16

## 2023-04-04 RX ORDER — METOPROLOL SUCCINATE 25 MG/1
TABLET, EXTENDED RELEASE ORAL
Qty: 45 TABLET | Refills: 3 | Status: CANCELLED
Start: 2023-04-04

## 2023-04-04 RX ORDER — CELECOXIB 200 MG/1
200 CAPSULE ORAL
Qty: 180 CAPSULE | Refills: 1 | Status: SHIPPED
Start: 2023-04-04

## 2023-04-04 RX ORDER — ALPRAZOLAM 0.25 MG/1
0.38 TABLET ORAL
Qty: 45 TABLET | Refills: 1 | Status: SHIPPED
Start: 2023-04-04

## 2023-04-04 NOTE — TELEPHONE ENCOUNTER
Toprol was sent on 10/14/22 for #45 with 3 refills. Last Visit: 1/27/23 with MD Annmarie Burrows  Next Appointment: 7/28/23 with MD Annmarie Burrows  Previous Refill Encounter(s): 12/22/21 Xanax #45 with 1 refill, 12/29/21 Celebrex #180 with 3 refills    Requested Prescriptions     Pending Prescriptions Disp Refills    ALPRAZolam (XANAX) 0.25 mg tablet 45 Tablet 1     Sig: Take 1.5 Tablets by mouth daily as needed for Anxiety. celecoxib (CELEBREX) 200 mg capsule 180 Capsule 3     Sig: Take 1 Capsule by mouth two (2) times a day. For Pharmacy Admin Tracking Only    Program: Medication Refill  CPA in place:   Recommendation Provided To:    Intervention Detail: New Rx: 3, reason: Patient Preference  Intervention Accepted By:   Francesca Heart Closed?:   Time Spent (min): 5

## 2023-08-01 ENCOUNTER — OFFICE VISIT (OUTPATIENT)
Age: 58
End: 2023-08-01

## 2023-08-01 VITALS
DIASTOLIC BLOOD PRESSURE: 79 MMHG | HEART RATE: 60 BPM | BODY MASS INDEX: 39.45 KG/M2 | SYSTOLIC BLOOD PRESSURE: 139 MMHG | RESPIRATION RATE: 22 BRPM | WEIGHT: 202 LBS | TEMPERATURE: 98.6 F | OXYGEN SATURATION: 100 %

## 2023-08-01 DIAGNOSIS — U07.1 COVID-19: Primary | ICD-10-CM

## 2023-08-01 LAB
Lab: ABNORMAL
QC PASS/FAIL: ABNORMAL
SARS-COV-2, POC: DETECTED

## 2023-08-01 RX ORDER — BENZONATATE 200 MG/1
200 CAPSULE ORAL 3 TIMES DAILY PRN
Qty: 30 CAPSULE | Refills: 0 | Status: SHIPPED | OUTPATIENT
Start: 2023-08-01 | End: 2023-08-08

## 2023-08-01 RX ORDER — DEXTROMETHORPHAN HYDROBROMIDE AND PROMETHAZINE HYDROCHLORIDE 15; 6.25 MG/5ML; MG/5ML
5 SYRUP ORAL 4 TIMES DAILY PRN
Qty: 118 ML | Refills: 0 | Status: SHIPPED | OUTPATIENT
Start: 2023-08-01

## 2023-08-01 NOTE — PROGRESS NOTES
Jaqui Hall (:  1965) is a 63 y.o. female,Established patient, here for evaluation of the following chief complaint(s):  Positive For Covid-19 (Covid positive, wants to be treated, not feeling well. Tested positive yesterday.)      ASSESSMENT/PLAN:  Visit Diagnoses and Associated Orders       COVID-19    -  Primary    POCT COVID-19, SARS-COV-2, PCR [01649 CPT(R)]      benzonatate (TESSALON) 200 MG capsule [03764]      promethazine-dextromethorphan (PROMETHAZINE-DM) 6.25-15 MG/5ML syrup [91881]      nirmatrelvir/ritonavir 300/100 (PAXLOVID, 300/100,) 20 x 150 MG & 10 x 100MG TBPK [983051]                 The patient was counseled for a POSITIVE test result for COVID-19. The following information was given to the patient:    The COVID-19 test result was positive. Mild and stable symptoms are managed at home. Day 0 is your first day of symptoms or a positive test.  Day 1 is the first day after your symptoms started or your test specimen was collected. If you have COVID-19 or have symptoms  Stay home for at least 5 days and isolate from others in your home. Wear a well-fitted mask if you must be around others in your home. End isolation after 5 full days if you are fever-free for 24 hours (without the use of fever-reducing medication) and your symptoms are improving. If you did NOT have symptoms  End isolation after at least 5 full days after your positive test.  Wear a well-fitted mask for 10 full days any time you are around others inside your home or in public. Do not go to places where you are unable to wear a mask. If you were severely ill with COVID-19, you should isolate for at least 10 days. Consult your doctor before ending isolation. Contact your medical provider if symptoms are worsening, such as difficulty breathing.     To prevent spreading COVID  Avoid travel and avoid being around people who are at high risk  Wash hands often with soap and water for at least 20 seconds or

## 2023-08-08 ENCOUNTER — OFFICE VISIT (OUTPATIENT)
Age: 58
End: 2023-08-08

## 2023-08-08 VITALS
SYSTOLIC BLOOD PRESSURE: 154 MMHG | BODY MASS INDEX: 38.02 KG/M2 | HEART RATE: 77 BPM | HEIGHT: 61 IN | OXYGEN SATURATION: 97 % | DIASTOLIC BLOOD PRESSURE: 76 MMHG | TEMPERATURE: 98.4 F | RESPIRATION RATE: 22 BRPM | WEIGHT: 201.4 LBS

## 2023-08-08 DIAGNOSIS — F51.01 PRIMARY INSOMNIA: ICD-10-CM

## 2023-08-08 RX ORDER — ZOLPIDEM TARTRATE 6.25 MG/1
TABLET, FILM COATED, EXTENDED RELEASE ORAL
Qty: 90 TABLET | Refills: 0 | Status: SHIPPED | OUTPATIENT
Start: 2023-08-08 | End: 2023-11-08

## 2023-08-08 RX ORDER — EPINEPHRINE 0.3 MG/.3ML
INJECTION SUBCUTANEOUS
Qty: 2 EACH | Refills: 3 | Status: SHIPPED | OUTPATIENT
Start: 2023-08-08

## 2023-08-08 SDOH — ECONOMIC STABILITY: INCOME INSECURITY: HOW HARD IS IT FOR YOU TO PAY FOR THE VERY BASICS LIKE FOOD, HOUSING, MEDICAL CARE, AND HEATING?: NOT HARD AT ALL

## 2023-08-08 SDOH — ECONOMIC STABILITY: FOOD INSECURITY: WITHIN THE PAST 12 MONTHS, YOU WORRIED THAT YOUR FOOD WOULD RUN OUT BEFORE YOU GOT MONEY TO BUY MORE.: NEVER TRUE

## 2023-08-08 SDOH — ECONOMIC STABILITY: HOUSING INSECURITY
IN THE LAST 12 MONTHS, WAS THERE A TIME WHEN YOU DID NOT HAVE A STEADY PLACE TO SLEEP OR SLEPT IN A SHELTER (INCLUDING NOW)?: NO

## 2023-08-08 SDOH — ECONOMIC STABILITY: FOOD INSECURITY: WITHIN THE PAST 12 MONTHS, THE FOOD YOU BOUGHT JUST DIDN'T LAST AND YOU DIDN'T HAVE MONEY TO GET MORE.: NEVER TRUE

## 2023-08-08 ASSESSMENT — PATIENT HEALTH QUESTIONNAIRE - PHQ9
8. MOVING OR SPEAKING SO SLOWLY THAT OTHER PEOPLE COULD HAVE NOTICED. OR THE OPPOSITE, BEING SO FIGETY OR RESTLESS THAT YOU HAVE BEEN MOVING AROUND A LOT MORE THAN USUAL: 0
3. TROUBLE FALLING OR STAYING ASLEEP: 0
6. FEELING BAD ABOUT YOURSELF - OR THAT YOU ARE A FAILURE OR HAVE LET YOURSELF OR YOUR FAMILY DOWN: 0
4. FEELING TIRED OR HAVING LITTLE ENERGY: 0
1. LITTLE INTEREST OR PLEASURE IN DOING THINGS: 0
SUM OF ALL RESPONSES TO PHQ QUESTIONS 1-9: 0
7. TROUBLE CONCENTRATING ON THINGS, SUCH AS READING THE NEWSPAPER OR WATCHING TELEVISION: 0
10. IF YOU CHECKED OFF ANY PROBLEMS, HOW DIFFICULT HAVE THESE PROBLEMS MADE IT FOR YOU TO DO YOUR WORK, TAKE CARE OF THINGS AT HOME, OR GET ALONG WITH OTHER PEOPLE: 0
9. THOUGHTS THAT YOU WOULD BE BETTER OFF DEAD, OR OF HURTING YOURSELF: 0
SUM OF ALL RESPONSES TO PHQ QUESTIONS 1-9: 0
5. POOR APPETITE OR OVEREATING: 0
2. FEELING DOWN, DEPRESSED OR HOPELESS: 0
SUM OF ALL RESPONSES TO PHQ QUESTIONS 1-9: 0
SUM OF ALL RESPONSES TO PHQ9 QUESTIONS 1 & 2: 0
SUM OF ALL RESPONSES TO PHQ QUESTIONS 1-9: 0

## 2023-08-08 NOTE — PROGRESS NOTES
Chief Complaint   Patient presents with    Follow-up     6 month f/u       1. \"Have you been to the ER, urgent care clinic since your last visit? Hospitalized since your last visit? \" Yes- COVID     2. \"Have you seen or consulted any other health care providers outside of the [address] [place] [address] since your last visit? \" No     3. For patients aged 43-73: Has the patient had a colonoscopy / FIT/ Cologuard? No      If the patient is female:    4. For patients aged 43-66: Has the patient had a mammogram within the past 2 years? N/A      5. For patients aged 21-65: Has the patient had a pap smear?  N/a     Health Maintenance Due   Topic Date Due    HIV screen  Never done    Shingles vaccine (2 of 2) 12/15/2020    Colorectal Cancer Screen  09/02/2022    COVID-19 Vaccine (4 - Booster for Pfizer series) 09/12/2022    DTaP/Tdap/Td vaccine (2 - Td or Tdap) 05/01/2023    Flu vaccine (1) 08/01/2023

## 2023-09-06 ENCOUNTER — OFFICE VISIT (OUTPATIENT)
Age: 58
End: 2023-09-06

## 2023-09-06 VITALS
TEMPERATURE: 98.5 F | OXYGEN SATURATION: 98 % | WEIGHT: 201 LBS | HEART RATE: 96 BPM | DIASTOLIC BLOOD PRESSURE: 103 MMHG | SYSTOLIC BLOOD PRESSURE: 182 MMHG | BODY MASS INDEX: 37.98 KG/M2

## 2023-09-06 DIAGNOSIS — R05.3 POST-COVID CHRONIC COUGH: Primary | ICD-10-CM

## 2023-09-06 DIAGNOSIS — U09.9 POST-COVID CHRONIC COUGH: Primary | ICD-10-CM

## 2023-09-06 DIAGNOSIS — J20.9 BRONCHITIS WITH BRONCHOSPASM: ICD-10-CM

## 2023-09-06 RX ORDER — ALBUTEROL SULFATE 90 UG/1
2 AEROSOL, METERED RESPIRATORY (INHALATION) 4 TIMES DAILY PRN
Qty: 18 G | Refills: 0 | Status: SHIPPED | OUTPATIENT
Start: 2023-09-06

## 2023-09-06 RX ORDER — DOXYCYCLINE HYCLATE 100 MG
100 TABLET ORAL 2 TIMES DAILY
Qty: 14 TABLET | Refills: 0 | Status: SHIPPED | OUTPATIENT
Start: 2023-09-06 | End: 2023-09-13

## 2023-09-06 ASSESSMENT — ENCOUNTER SYMPTOMS
HEMOPTYSIS: 0
WHEEZING: 0
COUGH: 1
HEARTBURN: 0
CHEST TIGHTNESS: 1
SHORTNESS OF BREATH: 0

## 2023-09-06 NOTE — PROGRESS NOTES
Chief Complaint   Patient presents with    Cough     Pt. Said she hear something in her chest in she coughing a lot          Cough  This is a new problem. The current episode started 1 to 4 weeks ago. The problem has been gradually worsening. The problem occurs every few minutes. The cough is Productive of sputum. Associated symptoms include chest pain (on left side with coughing). Pertinent negatives include no heartburn, hemoptysis, shortness of breath or wheezing. Nothing aggravates the symptoms. Risk factors: had covid on 23. Her past medical history is significant for bronchitis. There is no history of asthma or pneumonia. Past Medical History:   Diagnosis Date    Anemia 10/3/13    \"SINCE I WAS A KID\"    Anxiety     DURING CHEMO; PT STAYS XANAX AS OF 10/3/13    Cancer ([address]) [phone]    breast,chemo ended 10/2012    Chronic pain     si joints sacrum    Depression     Fibromyalgia     Nausea & vomiting     used patch in past with good results    Other ill-defined conditions(799.89)     anemia,chronic diarrhea, TMJ    Ovarian cyst     Palpitations     TAKES METOPROLOL FOR TACHYCARDIA    PUD (peptic ulcer disease)     occurred at age 33 .     Unspecified adverse effect of anesthesia     TMJ       Past Surgical History:   Procedure Laterality Date    BREAST RECONSTRUCTION  2012    R SEROMA EXCISED, REPLACE IMPLANT R BREAST    BREAST RECONSTRUCTION  2012    REINCISION LEFT BREAST FOR REMAINDER OF BREAST CA    BREAST RECONSTRUCTION  7/10/2013    BREAST NIPPLE RECONSTRUCTION REVISION performed by Kandi Jay MD at [address]      breast biopsy x3 left breast     SECTION      COLONOSCOPY FLX DX W/COLLJ SPEC WHEN PFRMD  2011    Dr Francois Generous    GI      anal fissure repair    GYN  10/21/13    TL[facility]/BSO    HYSTERECTOMY (CERVIX STATUS UNKNOWN)  10/2013    Dr. Rashid Metcalf    LAMINOTOMY  2010    MASTECTOMY Bilateral 2012    double    OTHER SURGICAL HISTORY      DENTAL

## 2023-09-06 NOTE — PATIENT INSTRUCTIONS
Use Mucinex DM Max twice a day    Xray Result (most recent):  XR CHEST STANDARD TWO VW 09/06/2023    Narrative  EXAM: XR CHEST (2 VW)    INDICATION: Acute cough    COMPARISON: March 2020    TECHNIQUE: PA and lateral chest views    FINDINGS: The cardiac size is within normal limits. The pulmonary vasculature is  within normal limits. The lungs and pleural spaces are clear. The visualized bones and upper abdomen  are age-appropriate.     Impression  No acute cardiopulmonary disease radiographically

## 2023-09-25 ENCOUNTER — OFFICE VISIT (OUTPATIENT)
Age: 58
End: 2023-09-25

## 2023-09-25 VITALS
OXYGEN SATURATION: 98 % | HEART RATE: 81 BPM | SYSTOLIC BLOOD PRESSURE: 136 MMHG | DIASTOLIC BLOOD PRESSURE: 88 MMHG | TEMPERATURE: 98.6 F | HEIGHT: 61 IN | BODY MASS INDEX: 37.61 KG/M2 | RESPIRATION RATE: 20 BRPM | WEIGHT: 199.2 LBS

## 2023-09-25 DIAGNOSIS — U09.9 POST-COVID CHRONIC COUGH: Primary | ICD-10-CM

## 2023-09-25 DIAGNOSIS — R05.3 POST-COVID CHRONIC COUGH: Primary | ICD-10-CM

## 2023-09-25 PROCEDURE — 99212 OFFICE O/P EST SF 10 MIN: CPT | Performed by: FAMILY MEDICINE

## 2023-09-25 PROCEDURE — 3075F SYST BP GE 130 - 139MM HG: CPT | Performed by: FAMILY MEDICINE

## 2023-09-25 PROCEDURE — 3079F DIAST BP 80-89 MM HG: CPT | Performed by: FAMILY MEDICINE

## 2023-09-25 RX ORDER — ALBUTEROL SULFATE 90 UG/1
2 AEROSOL, METERED RESPIRATORY (INHALATION) 4 TIMES DAILY PRN
Qty: 18 G | Refills: 0
Start: 2023-09-25

## 2023-09-25 RX ORDER — PREDNISONE 10 MG/1
10 TABLET ORAL 2 TIMES DAILY
Qty: 50 TABLET | Refills: 0 | Status: SHIPPED | OUTPATIENT
Start: 2023-09-25

## 2023-09-25 RX ORDER — DEXTROMETHORPHAN HYDROBROMIDE AND PROMETHAZINE HYDROCHLORIDE 15; 6.25 MG/5ML; MG/5ML
5 SYRUP ORAL EVERY 6 HOURS PRN
Qty: 240 ML | Refills: 0 | Status: SHIPPED | OUTPATIENT
Start: 2023-09-25

## 2023-09-25 SDOH — ECONOMIC STABILITY: FOOD INSECURITY: WITHIN THE PAST 12 MONTHS, YOU WORRIED THAT YOUR FOOD WOULD RUN OUT BEFORE YOU GOT MONEY TO BUY MORE.: NEVER TRUE

## 2023-09-25 SDOH — ECONOMIC STABILITY: INCOME INSECURITY: HOW HARD IS IT FOR YOU TO PAY FOR THE VERY BASICS LIKE FOOD, HOUSING, MEDICAL CARE, AND HEATING?: NOT HARD AT ALL

## 2023-09-25 SDOH — ECONOMIC STABILITY: FOOD INSECURITY: WITHIN THE PAST 12 MONTHS, THE FOOD YOU BOUGHT JUST DIDN'T LAST AND YOU DIDN'T HAVE MONEY TO GET MORE.: NEVER TRUE

## 2023-09-25 ASSESSMENT — PATIENT HEALTH QUESTIONNAIRE - PHQ9
8. MOVING OR SPEAKING SO SLOWLY THAT OTHER PEOPLE COULD HAVE NOTICED. OR THE OPPOSITE, BEING SO FIGETY OR RESTLESS THAT YOU HAVE BEEN MOVING AROUND A LOT MORE THAN USUAL: 0
SUM OF ALL RESPONSES TO PHQ QUESTIONS 1-9: 0
SUM OF ALL RESPONSES TO PHQ QUESTIONS 1-9: 0
5. POOR APPETITE OR OVEREATING: 0
SUM OF ALL RESPONSES TO PHQ QUESTIONS 1-9: 0
2. FEELING DOWN, DEPRESSED OR HOPELESS: 0
SUM OF ALL RESPONSES TO PHQ9 QUESTIONS 1 & 2: 0
10. IF YOU CHECKED OFF ANY PROBLEMS, HOW DIFFICULT HAVE THESE PROBLEMS MADE IT FOR YOU TO DO YOUR WORK, TAKE CARE OF THINGS AT HOME, OR GET ALONG WITH OTHER PEOPLE: 0
7. TROUBLE CONCENTRATING ON THINGS, SUCH AS READING THE NEWSPAPER OR WATCHING TELEVISION: 0
4. FEELING TIRED OR HAVING LITTLE ENERGY: 0
3. TROUBLE FALLING OR STAYING ASLEEP: 0
9. THOUGHTS THAT YOU WOULD BE BETTER OFF DEAD, OR OF HURTING YOURSELF: 0
SUM OF ALL RESPONSES TO PHQ QUESTIONS 1-9: 0
1. LITTLE INTEREST OR PLEASURE IN DOING THINGS: 0
6. FEELING BAD ABOUT YOURSELF - OR THAT YOU ARE A FAILURE OR HAVE LET YOURSELF OR YOUR FAMILY DOWN: 0

## 2023-09-25 ASSESSMENT — ENCOUNTER SYMPTOMS
VOMITING: 0
SHORTNESS OF BREATH: 0
DIARRHEA: 0
SORE THROAT: 0
SINUS PRESSURE: 0
RHINORRHEA: 1
NAUSEA: 0
CHEST TIGHTNESS: 0
WHEEZING: 1
COUGH: 1

## 2023-09-25 NOTE — PROGRESS NOTES
Chief Complaint   Patient presents with    Follow-up       1. \"Have you been to the ER, urgent care clinic since your last visit? Hospitalized since your last visit? \" Yes    2. \"Have you seen or consulted any other health care providers outside of the [address] [place] [address] since your last visit? \" No     3. For patients aged 43-73: Has the patient had a colonoscopy / FIT/ Cologuard? No      If the patient is female:    4. For patients aged 43-66: Has the patient had a mammogram within the past 2 years? N/A      5. For patients aged 21-65: Has the patient had a pap smear?  N/A-hysterectomy     Health Maintenance Due   Topic Date Due    Hepatitis B vaccine (1 of 3 - 3-dose series) Never done    HIV screen  Never done    Shingles vaccine (2 of 2) 12/15/2020    Colorectal Cancer Screen  09/02/2022    COVID-19 Vaccine (4 - Pfizer series) 09/12/2022    DTaP/Tdap/Td vaccine (2 - Td or Tdap) 05/01/2023    Flu vaccine (1) 08/01/2023

## 2023-09-25 NOTE — PROGRESS NOTES
Subjective:      Patient ID: Jaqui Hall is a 63 y.o. female. HPI  C/o nasal congestion and dry hacking cough for the past month since having COVID19. Was seen at  at the beginning of this month and given albuterol inhaler and abx. Has been trying to take mucinex DM liquid but this gag her and make her feels nauseas. Chest xray was done and was clear. Coughing up phlegm at times. No fever or chills noted. Has malaise and fatigue from the constant coughing. Coughing keeping her awake at night. Has post nasal drainage. No chest pain or SOB. Has had intermittent wheezing. Past Medical History:   Diagnosis Date    Anemia 10/3/13    \"SINCE I WAS A KID\"    Anxiety     DURING CHEMO; PT STAYS XANAX AS OF 10/3/13    Cancer ([address]) [phone]    breast,chemo ended 10/2012    Chronic pain     si joints sacrum    Depression     Fibromyalgia     Nausea & vomiting     used patch in past with good results    Other ill-defined conditions(799.89)     anemia,chronic diarrhea, TMJ    Ovarian cyst     Palpitations     TAKES METOPROLOL FOR TACHYCARDIA    PUD (peptic ulcer disease)     occurred at age 33 .     Unspecified adverse effect of anesthesia     TMJ     Past Surgical History:   Procedure Laterality Date    BREAST RECONSTRUCTION  2012    R SEROMA EXCISED, REPLACE IMPLANT R BREAST    BREAST RECONSTRUCTION  2012    REINCISION LEFT BREAST FOR REMAINDER OF BREAST CA    BREAST RECONSTRUCTION  7/10/2013    BREAST NIPPLE RECONSTRUCTION REVISION performed by Fred Appiah MD at [address] [address] [facility] [address] [address]      breast biopsy x3 left breast     SECTION      COLONOSCOPY FLX DX W/COLLJ SPEC WHEN PFRMD  2011    Dr Ancelmo Guerrero    GI      anal fissure repair    GYN  10/21/13    [facility]/BSO    HYSTERECTOMY (CERVIX STATUS UNKNOWN)  10/2013    Dr. Mariela Miranda    LAMINOTOMY  2010    MASTECTOMY Bilateral 2012    double    OTHER SURGICAL HISTORY      DENTAL SURGERIES WITH SEDATION    UPPER

## 2023-10-10 RX ORDER — METOPROLOL SUCCINATE 25 MG/1
12.5 TABLET, EXTENDED RELEASE ORAL NIGHTLY
Qty: 45 TABLET | Refills: 3 | Status: SHIPPED | OUTPATIENT
Start: 2023-10-10

## 2023-10-10 RX ORDER — CELECOXIB 200 MG/1
CAPSULE ORAL
Qty: 180 CAPSULE | Refills: 1 | Status: SHIPPED | OUTPATIENT
Start: 2023-10-10

## 2023-10-10 NOTE — TELEPHONE ENCOUNTER
Last appointment: 9/25/23  Next appointment: 10/30/23  Previous refill encounter(s): 4/4/23 90 d/s with 1 refill    Requested Prescriptions     Pending Prescriptions Disp Refills    celecoxib (CELEBREX) 200 MG capsule [Pharmacy Med Name: CELECOXIB 200 MG CAP] 180 capsule 1     Sig: TAKE ONE CAPSULE BY MOUTH TWICE A DAY AS NEEDED FOR PAIN         For Pharmacy Admin Tracking Only    Program: Medication Refill  CPA in place:    Recommendation Provided To:    Intervention Detail: New Rx: 1, reason: Patient Preference  Intervention Accepted By:   Cherise Grewal Closed?:    Time Spent (min): 5

## 2023-10-23 NOTE — MR AVS SNAPSHOT
Visit Information Date & Time Provider Department Dept. Phone Encounter #  
 6/9/2017  3:15 PM Roseline Clemons MD [facility] [phone] [id number] Follow-up Instructions Return in about 4 months (around 10/18/2017). Upcoming Health Maintenance Date Due FOBT Q 1 YEAR AGE 50-75 6/12/2016 Pneumococcal 19-64 Highest Risk (2 of 3 - PCV13) 5/27/2017 INFLUENZA AGE 9 TO ADULT 8/1/2017 PAP AKA CERVICAL CYTOLOGY 11/17/2018 DTaP/Tdap/Td series (2 - Td) 5/1/2023 Allergies as of 6/9/2017  Review Complete On: 6/9/2017 By: Roseline Clemons MD  
  
 Severity Noted Reaction Type Reactions Other Food  10/18/2013    Other (comments) PT STATES OTHER FOOD ALLERGIES WITH VARYING REACTIONS:  PORK, LETTUCE, LIMA BEANS, BLACK PEPPER, TEA, MALT, WALNUTS. Beef Containing Products High 04/24/2012   Systemic Anaphylaxis Food Extracts High 04/10/2012    Anaphylaxis Beef, milk, peanuts Milk High 04/24/2012   Systemic Anaphylaxis Peanut High 04/24/2012   Systemic Anaphylaxis Alpha-d-galactosidase  11/13/2014    Diarrhea Azithromycin  07/19/2011   Side Effect Nausea and Vomiting Erythromycin  04/19/2010    Nausea and Vomiting Guaifenesin  06/22/2016    Nausea and Vomiting Morphine  10/25/2010    Nausea and Vomiting Projectile vomiting per patient Other Medication  10/03/2013    Other (comments) WHITE CELL STIMULATOR INJECTION (NEULASTIN?) CAUSED SEVERE BONE PAIN LASTING A COUPLE WEEKS. Current Immunizations  Reviewed on 6/9/2017 Name Date Influenza Vaccine 10/31/2016, 9/22/2015 Influenza Vaccine Split 10/20/2010 Tdap 5/1/2013 Reviewed by Roseline Clemons MD on 6/9/2017 at  3:44 PM  
You Were Diagnosed With   
  
 Codes Comments Encounter for chronic pain management    -  Primary ICD-10-CM: G89.29 ICD-9-CM: V65.49 Chronic pain syndrome     ICD-10-CM: G89.4 ICD-9-CM: 338.4 DDD (degenerative disc disease), lumbar     ICD-10-CM: M51.36 
ICD-9-CM: 722.52 Vitals BP Pulse Temp Resp Height(growth percentile) Weight(growth percentile) 136/81 (BP 1 Location: Left arm, BP Patient Position: Sitting) 74 97.2 °F (36.2 °C) (Oral) 16 5' 1\" (1.549 m) 190 lb (86.2 kg) LMP SpO2 BMI OB Status Smoking Status 08/03/2012 97% 35.9 kg/m2 Hysterectomy Never Smoker Vitals History BMI and BSA Data Body Mass Index Body Surface Area 35.9 kg/m 2 1.93 m 2 Preferred Pharmacy Pharmacy Name Phone [id number]- [place], [place] - [address] [phone] Your Updated Medication List  
  
   
This list is accurate as of: 6/9/17  3:55 PM.  Always use your most recent med list.  
  
  
  
  
 albuterol sulfate 90 mcg/actuation Aepb Commonly known as:  Robby Linker Take 2 Puffs by inhalation every four (4) hours as needed. ALPRAZolam 0.25 mg tablet Commonly known as:  XANAX  
TAKE 1 OR 2 TABLETS THREE TIMES A DAY AS NEEDED  
  
 BENADRYL 25 mg capsule Generic drug:  diphenhydrAMINE Take 50 mg by mouth every six (6) hours as needed. Indications: ALLERGIC REACTIONS  
  
 celecoxib 200 mg capsule Commonly known as:  CELEBREX  
TAKE 1 CAPSULE TWICE A DAY AS NEEDED CLARITIN 10 mg tablet Generic drug:  loratadine Take 10 mg by mouth daily (after lunch). TAKES AS NEEDED  
  
 EPINEPHrine 0.3 mg/0.3 mL injection Commonly known as:  EPIPEN 2-JULIAN  
0.3 mL by IntraMUSCular route once as needed for up to 1 dose. esomeprazole 40 mg capsule Commonly known as:  NexIUM Take 1 Cap by mouth daily. gabapentin 300 mg capsule Commonly known as:  NEURONTIN  
TAKE ONE CAPSULE BY MOUTH EACH NIGHT HYDROcodone-acetaminophen 5-325 mg per tablet Commonly known as:  NORCO  
TAKE 1 TABLET EVERY 8 HOURS AS NEEDED FOR PAIN  
  
 metoprolol succinate 25 mg XL tablet Commonly known as:  TOPROL XL Take 0.5 Tabs by mouth daily. mometasone 50 mcg/actuation nasal spray Commonly known as:  NASONEX  
2 Sprays by Both Nostrils route as needed. pilocarpine 5 mg tablet Commonly known as:  Kristen Shira Take 1 tab 2 times daily  
  
 promethazine-dextromethorphan 6.25-15 mg/5 mL syrup Commonly known as:  PROMETHAZINE-DM Take 5 mL by mouth every six (6) hours as needed for Cough. tamoxifen 20 mg tablet Commonly known as:  NOLVADEX Take 1 Tab by mouth nightly. traMADol 300 mg tablet Commonly known as:  ULTRAM-ER Take 1 Tab by mouth nightly. zolpidem CR 6.25 mg tablet Commonly known as:  AMBIEN CR  
TAKE 1 TABLET BY MOUTH IN THE EVENING AS NEEDED FOR SLEEP ZyrTEC 10 mg tablet Generic drug:  cetirizine Take 10 mg by mouth nightly. Prescriptions Printed Refills HYDROcodone-acetaminophen (NORCO) 5-325 mg per tablet 0 Sig: TAKE 1 TABLET EVERY 8 HOURS AS NEEDED FOR PAIN Class: Print  
 zolpidem CR (AMBIEN CR) 6.25 mg tablet 1 Sig: TAKE 1 TABLET BY MOUTH IN THE EVENING AS NEEDED FOR SLEEP Class: Print We Performed the Following 9-DRUG SCREEN + OXY, UR [id number] CPT(R)] Follow-up Instructions Return in about 4 months (around 10/18/2017). Introducing [facility]! Dear Larry Doing: Thank you for requesting a [employer] account. Our records indicate that you already have an active [employer] account. You can access your account anytime at [email or web address] Did you know that you can access your hospital and ER discharge instructions at any time in [employer]? You can also review all of your test results from your hospital stay or ER visit. Additional Information If you have questions, please visit the Frequently Asked Questions section of the [employer] website at [email or web address] Remember, MyChart is NOT to be used for urgent needs. For medical emergencies, dial 911. Now available from your iPhone and Android! Please provide this summary of care documentation to your next provider. Your primary care clinician is listed as Christa Doshi. If you have any questions after today's visit, please call [phone]. Consent (Near Eyelid Margin)/Introductory Paragraph: The rationale for Mohs was explained to the patient and consent was obtained. The risks, benefits and alternatives to therapy were discussed in detail. Specifically, the risks of ectropion or eyelid deformity, infection, scarring, bleeding, prolonged wound healing, incomplete removal, allergy to anesthesia, nerve injury and recurrence were addressed. Prior to the procedure, the treatment site was clearly identified and confirmed by the patient. All components of Universal Protocol/PAUSE Rule completed.

## 2023-10-30 ENCOUNTER — OFFICE VISIT (OUTPATIENT)
Age: 58
End: 2023-10-30

## 2023-10-30 VITALS
HEART RATE: 80 BPM | DIASTOLIC BLOOD PRESSURE: 84 MMHG | RESPIRATION RATE: 16 BRPM | WEIGHT: 203.4 LBS | OXYGEN SATURATION: 96 % | SYSTOLIC BLOOD PRESSURE: 144 MMHG | BODY MASS INDEX: 38.43 KG/M2 | TEMPERATURE: 98.2 F

## 2023-10-30 DIAGNOSIS — Z79.899 ENCOUNTER FOR LONG-TERM (CURRENT) USE OF MEDICATIONS: ICD-10-CM

## 2023-10-30 DIAGNOSIS — E78.00 HYPERCHOLESTEROLEMIA: ICD-10-CM

## 2023-10-30 DIAGNOSIS — Z23 ENCOUNTER FOR IMMUNIZATION: ICD-10-CM

## 2023-10-30 DIAGNOSIS — Z91.09 ENVIRONMENTAL ALLERGIES: ICD-10-CM

## 2023-10-30 DIAGNOSIS — R73.02 IMPAIRED GLUCOSE TOLERANCE (ORAL): ICD-10-CM

## 2023-10-30 DIAGNOSIS — N95.1 HOT FLASH, MENOPAUSAL: ICD-10-CM

## 2023-10-30 DIAGNOSIS — F43.22 ADJUSTMENT DISORDER WITH ANXIETY: ICD-10-CM

## 2023-10-30 DIAGNOSIS — K21.9 GASTRO-ESOPHAGEAL REFLUX DISEASE WITHOUT ESOPHAGITIS: ICD-10-CM

## 2023-10-30 DIAGNOSIS — E66.9 NON MORBID OBESITY: ICD-10-CM

## 2023-10-30 DIAGNOSIS — I10 ESSENTIAL (PRIMARY) HYPERTENSION: Primary | ICD-10-CM

## 2023-10-30 DIAGNOSIS — M47.816 SPONDYLOSIS WITHOUT MYELOPATHY OR RADICULOPATHY, LUMBAR REGION: ICD-10-CM

## 2023-10-30 PROCEDURE — 99214 OFFICE O/P EST MOD 30 MIN: CPT | Performed by: FAMILY MEDICINE

## 2023-10-30 PROCEDURE — 90674 CCIIV4 VAC NO PRSV 0.5 ML IM: CPT | Performed by: FAMILY MEDICINE

## 2023-10-30 PROCEDURE — PBSHW INFLUENZA, FLUCELVAX, (AGE 6 MO+), IM, PF, 0.5 ML: Performed by: FAMILY MEDICINE

## 2023-10-30 PROCEDURE — 3079F DIAST BP 80-89 MM HG: CPT | Performed by: FAMILY MEDICINE

## 2023-10-30 PROCEDURE — 3077F SYST BP >= 140 MM HG: CPT | Performed by: FAMILY MEDICINE

## 2023-10-30 RX ORDER — PANTOPRAZOLE SODIUM 40 MG/1
40 TABLET, DELAYED RELEASE ORAL DAILY
Qty: 90 TABLET | Refills: 1 | Status: SHIPPED | OUTPATIENT
Start: 2023-10-30

## 2023-10-30 RX ORDER — EPINEPHRINE 0.3 MG/.3ML
INJECTION SUBCUTANEOUS
Qty: 2 EACH | Refills: 3 | Status: SHIPPED | OUTPATIENT
Start: 2023-10-30

## 2023-10-30 RX ORDER — METOPROLOL SUCCINATE 25 MG/1
25 TABLET, EXTENDED RELEASE ORAL NIGHTLY
Qty: 90 TABLET | Refills: 3 | Status: SHIPPED | OUTPATIENT
Start: 2023-10-30

## 2023-10-30 RX ORDER — GABAPENTIN 100 MG/1
100 CAPSULE ORAL 2 TIMES DAILY
Qty: 180 CAPSULE | Refills: 1 | Status: SHIPPED | OUTPATIENT
Start: 2023-10-30 | End: 2024-04-27

## 2023-10-30 ASSESSMENT — ENCOUNTER SYMPTOMS
SHORTNESS OF BREATH: 0
CONSTIPATION: 0
BLOOD IN STOOL: 0
RHINORRHEA: 1
COUGH: 0
ABDOMINAL PAIN: 0
CHEST TIGHTNESS: 0

## 2023-10-30 ASSESSMENT — PATIENT HEALTH QUESTIONNAIRE - PHQ9
1. LITTLE INTEREST OR PLEASURE IN DOING THINGS: 0
4. FEELING TIRED OR HAVING LITTLE ENERGY: 1
6. FEELING BAD ABOUT YOURSELF - OR THAT YOU ARE A FAILURE OR HAVE LET YOURSELF OR YOUR FAMILY DOWN: 0
9. THOUGHTS THAT YOU WOULD BE BETTER OFF DEAD, OR OF HURTING YOURSELF: 0
5. POOR APPETITE OR OVEREATING: 0
SUM OF ALL RESPONSES TO PHQ QUESTIONS 1-9: 2
7. TROUBLE CONCENTRATING ON THINGS, SUCH AS READING THE NEWSPAPER OR WATCHING TELEVISION: 0
8. MOVING OR SPEAKING SO SLOWLY THAT OTHER PEOPLE COULD HAVE NOTICED. OR THE OPPOSITE, BEING SO FIGETY OR RESTLESS THAT YOU HAVE BEEN MOVING AROUND A LOT MORE THAN USUAL: 0
2. FEELING DOWN, DEPRESSED OR HOPELESS: 0
3. TROUBLE FALLING OR STAYING ASLEEP: 1
SUM OF ALL RESPONSES TO PHQ9 QUESTIONS 1 & 2: 0
SUM OF ALL RESPONSES TO PHQ QUESTIONS 1-9: 2

## 2023-10-30 ASSESSMENT — COLUMBIA-SUICIDE SEVERITY RATING SCALE - C-SSRS
4. HAVE YOU HAD THESE THOUGHTS AND HAD SOME INTENTION OF ACTING ON THEM?: NO
5. HAVE YOU STARTED TO WORK OUT OR WORKED OUT THE DETAILS OF HOW TO KILL YOURSELF? DO YOU INTEND TO CARRY OUT THIS PLAN?: NO
7. DID THIS OCCUR IN THE LAST THREE MONTHS: NO
3. HAVE YOU BEEN THINKING ABOUT HOW YOU MIGHT KILL YOURSELF?: NO

## 2023-10-31 LAB
ALBUMIN SERPL-MCNC: 3.7 G/DL (ref 3.5–5)
ALBUMIN/GLOB SERPL: 0.9 (ref 1.1–2.2)
ALP SERPL-CCNC: 105 U/L (ref 45–117)
ALT SERPL-CCNC: 40 U/L (ref 12–78)
ANION GAP SERPL CALC-SCNC: 4 MMOL/L (ref 5–15)
AST SERPL-CCNC: 16 U/L (ref 15–37)
BILIRUB SERPL-MCNC: 0.3 MG/DL (ref 0.2–1)
BUN SERPL-MCNC: 7 MG/DL (ref 6–20)
BUN/CREAT SERPL: 9 (ref 12–20)
CALCIUM SERPL-MCNC: 9.1 MG/DL (ref 8.5–10.1)
CHLORIDE SERPL-SCNC: 106 MMOL/L (ref 97–108)
CHOLEST SERPL-MCNC: 216 MG/DL
CO2 SERPL-SCNC: 27 MMOL/L (ref 21–32)
CREAT SERPL-MCNC: 0.8 MG/DL (ref 0.55–1.02)
ERYTHROCYTE [DISTWIDTH] IN BLOOD BY AUTOMATED COUNT: 14.3 % (ref 11.5–14.5)
EST. AVERAGE GLUCOSE BLD GHB EST-MCNC: 148 MG/DL
GLOBULIN SER CALC-MCNC: 4 G/DL (ref 2–4)
GLUCOSE SERPL-MCNC: 151 MG/DL (ref 65–100)
HBA1C MFR BLD: 6.8 % (ref 4–5.6)
HCT VFR BLD AUTO: 42.3 % (ref 35–47)
HDLC SERPL-MCNC: 39 MG/DL
HDLC SERPL: 5.5 (ref 0–5)
HGB BLD-MCNC: 12.8 G/DL (ref 11.5–16)
LDLC SERPL CALC-MCNC: 148.2 MG/DL (ref 0–100)
MCH RBC QN AUTO: 26.3 PG (ref 26–34)
MCHC RBC AUTO-ENTMCNC: 30.3 G/DL (ref 30–36.5)
MCV RBC AUTO: 86.9 FL (ref 80–99)
NRBC # BLD: 0 K/UL (ref 0–0.01)
NRBC BLD-RTO: 0 PER 100 WBC
PLATELET # BLD AUTO: 321 K/UL (ref 150–400)
PMV BLD AUTO: 11.7 FL (ref 8.9–12.9)
POTASSIUM SERPL-SCNC: 3.6 MMOL/L (ref 3.5–5.1)
PROT SERPL-MCNC: 7.7 G/DL (ref 6.4–8.2)
RBC # BLD AUTO: 4.87 M/UL (ref 3.8–5.2)
SODIUM SERPL-SCNC: 137 MMOL/L (ref 136–145)
TRIGL SERPL-MCNC: 144 MG/DL
VLDLC SERPL CALC-MCNC: 28.8 MG/DL
WBC # BLD AUTO: 7.8 K/UL (ref 3.6–11)

## 2023-11-02 ENCOUNTER — TELEPHONE (OUTPATIENT)
Age: 58
End: 2023-11-02

## 2023-11-02 NOTE — TELEPHONE ENCOUNTER
Pt called. A1c level at 6.8%. She will meet with [facility] Member Desk GOLDEN for diabetic teaching. She is interested in ozmepic to treat for diabetes.

## 2023-11-13 DIAGNOSIS — F51.01 PRIMARY INSOMNIA: Primary | ICD-10-CM

## 2023-11-13 RX ORDER — ZOLPIDEM TARTRATE 6.25 MG/1
6.25 TABLET, FILM COATED, EXTENDED RELEASE ORAL NIGHTLY PRN
Qty: 90 TABLET | Refills: 0 | Status: SHIPPED | OUTPATIENT
Start: 2023-11-13 | End: 2024-02-11

## 2023-11-13 NOTE — TELEPHONE ENCOUNTER
----- Message from Teresa Valdovinos sent at 11/13/2023  3:18 PM EST -----  Subject: Refill Request    QUESTIONS  Name of Medication? zolpidem (AMBIEN CR) 6.25 MG extended release tablet  Patient-reported dosage and instructions? TAKE ONE TABLET BY MOUTH EVERY   NIGHT AS NEEDED FOR SLEEP  How many days do you have left? 0  Preferred Pharmacy? [address] phone number (if available)? [phone]  Additional Information for Provider? Please f/u with Leatha Chacon   once sent to pharmacy. Patient is completely out.   ---------------------------------------------------------------------------  --------------  CALL BACK INFO  What is the best way for the office to contact you? OK to leave message on   [employer],OK to respond with electronic message via [employer] portal (only   for patients who have registered [employer] account)  Preferred Call Back Phone Number? [phone]  ---------------------------------------------------------------------------  --------------  SCRIPT ANSWERS  Relationship to Patient?  Self

## 2023-11-13 NOTE — TELEPHONE ENCOUNTER
Last appointment: 10/30/23  Next appointment: 1/31/24  Previous refill encounter(s): 8/8/23 #90    Requested Prescriptions     Pending Prescriptions Disp Refills    zolpidem (AMBIEN CR) 6.25 MG extended release tablet 90 tablet 0     Sig: Take 1 tablet by mouth nightly as needed for Sleep for up to 90 days. Max Daily Amount: 6.25 mg         For Pharmacy Admin Tracking Only    Program: Medication Refill  CPA in place:    Recommendation Provided To:    Intervention Detail: New Rx: 1, reason: Patient Preference  Intervention Accepted By:   Oliva Villagomez Closed?:    Time Spent (min): 5

## 2023-11-15 ENCOUNTER — PHARMACY VISIT (OUTPATIENT)
Age: 58
End: 2023-11-15

## 2023-11-15 DIAGNOSIS — R73.02 IMPAIRED GLUCOSE TOLERANCE (ORAL): Primary | ICD-10-CM

## 2023-11-15 RX ORDER — SEMAGLUTIDE 0.68 MG/ML
INJECTION, SOLUTION SUBCUTANEOUS
Qty: 3 ML | Refills: 0 | Status: SHIPPED | COMMUNITY
Start: 2023-11-15

## 2023-11-15 NOTE — PROGRESS NOTES
Initial diabetes education and management visit, new diagnosis with A1c of 6.8%, interested in considering a GLP1 agonist / ozempic. Previous A1cs mostly in the pre diabetes range before that   Referred by Dr. Víctor Cochran     Pt got COVID in August and was on steroids during that time frame, 3 weeks  - may have contributed some to her A1c elevation    Dad had diabetes later in life     Diet:   No beef - and allergic to a lot of things    Loves salmon, fish, chicken, eggs  She is working on her cholesterol; reducing butter    Does drink sodas,  has been improving in this   Minute maid lemonade - 1 cup a day   Water with a little bit of apple juice   Did drink diet drinks for 10 years    Is interested in a GLP1 agonist to help with diabetes and weight loss; sounds like she will qualify.     Education Today - New Diabetes Diagnosis:  -Nutrition: utilized \"Planning Healthy Meals\" Education Materials to review healthy plate, Carbohydrate and Non-Carbohydrate foods, Carbohydrate content for foods / serving sizes / 15 grams = 1 Carb serving, reading the nutrition label / focusing on total carbs and serving size, portion sizes / portion control of carbs, healthy snacks that are low carb, discussion of fruits / portion sizes to target  -SMBG: monitoring blood glucose and using log; check fasting BG for now  -Goals: SMBGs, A1c - education hand outs provided   -Signs and Symptoms of Hyperglycemia and Hypoglycemia and what to do - education hand outs provided  -Exercise: positive impact on blood sugar control  -Complications Avoidance - controlling blood glucose to prevent complications  -Monitoring recommended including eye exam, foot exam, microalbumin, kindey function, blood pressure, cholesterol, vaccines    Did patient assistance application on line for ozempic today  Approved for [employer]  Ozempic education   Sample pen provided today to start medication   Sample logged and signed out per policy  Per CPA with

## 2023-12-05 ENCOUNTER — OFFICE VISIT (OUTPATIENT)
Age: 58
End: 2023-12-05

## 2023-12-05 VITALS
WEIGHT: 201 LBS | DIASTOLIC BLOOD PRESSURE: 81 MMHG | BODY MASS INDEX: 37.98 KG/M2 | TEMPERATURE: 98.4 F | OXYGEN SATURATION: 95 % | SYSTOLIC BLOOD PRESSURE: 138 MMHG | HEART RATE: 74 BPM | RESPIRATION RATE: 20 BRPM

## 2023-12-05 DIAGNOSIS — S61.451A DOG BITE, HAND, RIGHT, INITIAL ENCOUNTER: Primary | ICD-10-CM
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
## 2023-12-05 DIAGNOSIS — Z23 NEED FOR DIPHTHERIA-TETANUS-PERTUSSIS (TDAP) VACCINE: ICD-10-CM

## 2023-12-05 RX ORDER — AMOXICILLIN AND CLAVULANATE POTASSIUM 875; 125 MG/1; MG/1
1 TABLET, FILM COATED ORAL 2 TIMES DAILY
Qty: 14 TABLET | Refills: 0 | Status: SHIPPED | OUTPATIENT
Start: 2023-12-05 | End: 2023-12-12

## 2023-12-27 ENCOUNTER — TELEPHONE (OUTPATIENT)
Age: 58
End: 2023-12-27

## 2024-01-31 ENCOUNTER — OFFICE VISIT (OUTPATIENT)
Age: 59
End: 2024-01-31

## 2024-01-31 VITALS
HEIGHT: 61 IN | HEART RATE: 75 BPM | TEMPERATURE: 97.7 F | SYSTOLIC BLOOD PRESSURE: 148 MMHG | DIASTOLIC BLOOD PRESSURE: 90 MMHG | OXYGEN SATURATION: 97 % | WEIGHT: 195.6 LBS | RESPIRATION RATE: 18 BRPM | BODY MASS INDEX: 36.93 KG/M2

## 2024-01-31 DIAGNOSIS — I10 ESSENTIAL (PRIMARY) HYPERTENSION: ICD-10-CM

## 2024-01-31 DIAGNOSIS — Z79.899 ENCOUNTER FOR LONG-TERM (CURRENT) USE OF MEDICATIONS: ICD-10-CM

## 2024-01-31 DIAGNOSIS — K21.9 GASTRO-ESOPHAGEAL REFLUX DISEASE WITHOUT ESOPHAGITIS: ICD-10-CM

## 2024-01-31 DIAGNOSIS — F43.22 ADJUSTMENT DISORDER WITH ANXIETY: ICD-10-CM

## 2024-01-31 DIAGNOSIS — E78.00 HYPERCHOLESTEROLEMIA: ICD-10-CM

## 2024-01-31 DIAGNOSIS — E11.9 TYPE 2 DIABETES MELLITUS WITHOUT COMPLICATION, WITHOUT LONG-TERM CURRENT USE OF INSULIN (HCC): Primary | ICD-10-CM

## 2024-01-31 DIAGNOSIS — F51.01 PRIMARY INSOMNIA: ICD-10-CM

## 2024-01-31 DIAGNOSIS — S60.352A FOREIGN BODY OF LEFT THUMB, INITIAL ENCOUNTER: ICD-10-CM

## 2024-01-31 DIAGNOSIS — F41.9 ANXIETY: ICD-10-CM

## 2024-01-31 LAB
GLUCOSE, POC: 96 MG/DL
HBA1C MFR BLD: 5.8 %

## 2024-01-31 PROCEDURE — 82962 GLUCOSE BLOOD TEST: CPT | Performed by: FAMILY MEDICINE

## 2024-01-31 PROCEDURE — 3077F SYST BP >= 140 MM HG: CPT | Performed by: FAMILY MEDICINE

## 2024-01-31 PROCEDURE — 99214 OFFICE O/P EST MOD 30 MIN: CPT | Performed by: FAMILY MEDICINE

## 2024-01-31 PROCEDURE — PBSHW AMB POC HEMOGLOBIN A1C: Performed by: FAMILY MEDICINE

## 2024-01-31 PROCEDURE — 83036 HEMOGLOBIN GLYCOSYLATED A1C: CPT | Performed by: FAMILY MEDICINE

## 2024-01-31 PROCEDURE — 3079F DIAST BP 80-89 MM HG: CPT | Performed by: FAMILY MEDICINE

## 2024-01-31 PROCEDURE — PBSHW AMB POC GLUCOSE BLOOD, BY GLUCOSE MONITORING DEVICE: Performed by: FAMILY MEDICINE

## 2024-01-31 RX ORDER — ALPRAZOLAM 0.25 MG/1
0.25 TABLET ORAL DAILY PRN
Qty: 30 TABLET | Refills: 0 | Status: SHIPPED | OUTPATIENT
Start: 2024-01-31 | End: 2024-05-09

## 2024-01-31 RX ORDER — ZOLPIDEM TARTRATE 6.25 MG/1
6.25 TABLET, FILM COATED, EXTENDED RELEASE ORAL NIGHTLY PRN
Qty: 90 TABLET | Refills: 0 | Status: CANCELLED | OUTPATIENT
Start: 2024-01-31 | End: 2024-04-30

## 2024-01-31 RX ORDER — LOSARTAN POTASSIUM 25 MG/1
25 TABLET ORAL DAILY
Qty: 30 TABLET | Refills: 5 | Status: SHIPPED | OUTPATIENT
Start: 2024-01-31

## 2024-01-31 SDOH — ECONOMIC STABILITY: INCOME INSECURITY: HOW HARD IS IT FOR YOU TO PAY FOR THE VERY BASICS LIKE FOOD, HOUSING, MEDICAL CARE, AND HEATING?: NOT HARD AT ALL

## 2024-01-31 SDOH — ECONOMIC STABILITY: FOOD INSECURITY: WITHIN THE PAST 12 MONTHS, YOU WORRIED THAT YOUR FOOD WOULD RUN OUT BEFORE YOU GOT MONEY TO BUY MORE.: NEVER TRUE

## 2024-01-31 SDOH — ECONOMIC STABILITY: FOOD INSECURITY: WITHIN THE PAST 12 MONTHS, THE FOOD YOU BOUGHT JUST DIDN'T LAST AND YOU DIDN'T HAVE MONEY TO GET MORE.: NEVER TRUE

## 2024-01-31 ASSESSMENT — ENCOUNTER SYMPTOMS
RHINORRHEA: 0
SHORTNESS OF BREATH: 0
COUGH: 0
BLOOD IN STOOL: 0
CHEST TIGHTNESS: 0
CONSTIPATION: 0
ABDOMINAL PAIN: 0

## 2024-01-31 ASSESSMENT — PATIENT HEALTH QUESTIONNAIRE - PHQ9
8. MOVING OR SPEAKING SO SLOWLY THAT OTHER PEOPLE COULD HAVE NOTICED. OR THE OPPOSITE, BEING SO FIGETY OR RESTLESS THAT YOU HAVE BEEN MOVING AROUND A LOT MORE THAN USUAL: 0
SUM OF ALL RESPONSES TO PHQ QUESTIONS 1-9: 0
7. TROUBLE CONCENTRATING ON THINGS, SUCH AS READING THE NEWSPAPER OR WATCHING TELEVISION: 0
SUM OF ALL RESPONSES TO PHQ QUESTIONS 1-9: 1
SUM OF ALL RESPONSES TO PHQ9 QUESTIONS 1 & 2: 0
4. FEELING TIRED OR HAVING LITTLE ENERGY: 1
SUM OF ALL RESPONSES TO PHQ QUESTIONS 1-9: 0
3. TROUBLE FALLING OR STAYING ASLEEP: 0
8. MOVING OR SPEAKING SO SLOWLY THAT OTHER PEOPLE COULD HAVE NOTICED. OR THE OPPOSITE, BEING SO FIGETY OR RESTLESS THAT YOU HAVE BEEN MOVING AROUND A LOT MORE THAN USUAL: 0
9. THOUGHTS THAT YOU WOULD BE BETTER OFF DEAD, OR OF HURTING YOURSELF: 0
1. LITTLE INTEREST OR PLEASURE IN DOING THINGS: 0
5. POOR APPETITE OR OVEREATING: 0
SUM OF ALL RESPONSES TO PHQ QUESTIONS 1-9: 1
SUM OF ALL RESPONSES TO PHQ QUESTIONS 1-9: 1
2. FEELING DOWN, DEPRESSED OR HOPELESS: 0
SUM OF ALL RESPONSES TO PHQ9 QUESTIONS 1 & 2: 0
7. TROUBLE CONCENTRATING ON THINGS, SUCH AS READING THE NEWSPAPER OR WATCHING TELEVISION: 0
9. THOUGHTS THAT YOU WOULD BE BETTER OFF DEAD, OR OF HURTING YOURSELF: 0
SUM OF ALL RESPONSES TO PHQ QUESTIONS 1-9: 1
2. FEELING DOWN, DEPRESSED OR HOPELESS: 0
3. TROUBLE FALLING OR STAYING ASLEEP: 0
SUM OF ALL RESPONSES TO PHQ QUESTIONS 1-9: 0
1. LITTLE INTEREST OR PLEASURE IN DOING THINGS: 0
6. FEELING BAD ABOUT YOURSELF - OR THAT YOU ARE A FAILURE OR HAVE LET YOURSELF OR YOUR FAMILY DOWN: 0
4. FEELING TIRED OR HAVING LITTLE ENERGY: 0
SUM OF ALL RESPONSES TO PHQ QUESTIONS 1-9: 0
6. FEELING BAD ABOUT YOURSELF - OR THAT YOU ARE A FAILURE OR HAVE LET YOURSELF OR YOUR FAMILY DOWN: 0
5. POOR APPETITE OR OVEREATING: 0

## 2024-01-31 NOTE — PROGRESS NOTES
Chief Complaint   Patient presents with    3 Month Follow-Up       \"Have you been to the ER, urgent care clinic since your last visit?  Hospitalized since your last visit?\"    YES, Urgent Care for dog bite.    “Have you seen or consulted any other health care providers outside of [facility] since your last visit?”    NO             Vitals:    24 1554   BP: (!) 155/82   Pulse:    Resp:    Temp:    SpO2:        Health Maintenance Due   Topic Date Due    A1C test (Diabetic or Prediabetic)  2024        The patient, Nohemi Awan, identity was verified by name and .   
([facility])  Improved A1c at 5.9%.  Continue to monitor.  Work on diet and exercise.  -     AMB POC GLUCOSE BLOOD, BY GLUCOSE MONITORING DEVICE  -     AMB POC HEMOGLOBIN A1C    Essential (primary) hypertension  -     add losartan (COZAAR) 25 MG tablet; Take 1 tablet by mouth daily    Hypercholesterolemia  Continue to monitor.  Work on diet and exercise.    Gastro-esophageal reflux disease without esophagitis  Stable on protonix    Primary insomnia  Chronic, stable     Adjustment disorder with anxiety  Chronic, stable     Anxiety  -     refill ALPRAZolam (XANAX) 0.25 MG tablet; Take 1 tablet by mouth daily as needed for Anxiety for up to 99 days. Max Daily Amount: 0.25 mg    Foreign body of left thumb, initial encounter  Cleaned     Encounter for long-term (current) use of medications  Return in about 1 month (around 2/29/2024).  reviewed diet, exercise and weight control  cardiovascular risk and specific lipid/LDL goals reviewed  reviewed medications and side effects in detail  specific diabetic recommendations: low cholesterol diet, weight control and daily exercise discussed, all medications, side effects and compliance discussed carefully, foot care discussed and Podiatry visits discussed, annual eye examinations at Ophthalmology discussed, and glycohemoglobin and other lab monitoring discussed         I have discussed diagnosis listed in this note with pt and/or family. I have discussed treatment plans and options and the risk/benefit analysis of those options, including safe use of medications and possible medication side effects.   Through the use of shared decision making we have agreed to the above plan. The patient has received an after-visit summary and questions were answered concerning future plans and follow up.  Advise pt of any urgent changes then to proceed to the ER.            Eliz Barrientos MD

## 2024-02-05 DIAGNOSIS — F51.01 PRIMARY INSOMNIA: ICD-10-CM

## 2024-02-05 RX ORDER — ZOLPIDEM TARTRATE 6.25 MG/1
TABLET, FILM COATED, EXTENDED RELEASE ORAL
Qty: 90 TABLET | Refills: 0 | OUTPATIENT
Start: 2024-02-05 | End: 2024-05-05

## 2024-02-05 RX ORDER — VENLAFAXINE HYDROCHLORIDE 75 MG/1
CAPSULE, EXTENDED RELEASE ORAL
Qty: 180 CAPSULE | Refills: 3 | Status: SHIPPED | OUTPATIENT
Start: 2024-02-05

## 2024-02-05 NOTE — TELEPHONE ENCOUNTER
Last appointment: 1/31/24  Next appointment: 2/28/24  Previous refill encounter(s): 11/13/23 Ambien #90, 1/27/23 Effexor    Requested Prescriptions     Pending Prescriptions Disp Refills    venlafaxine (EFFEXOR XR) 75 MG extended release capsule [Pharmacy Med Name: VENLAFAXINE ER 75 MG CAP[*]] 180 capsule 3     Sig: TAKE TWO CAPSULES BY MOUTH ONE TIME DAILY    zolpidem (AMBIEN CR) 6.25 MG extended release tablet [Pharmacy Med Name: ZOLPIDEM ER 6.25 MG TAB] 90 tablet 0     Sig: TAKE ONE TABLET BY MOUTH EVERY NIGHT AS NEEDED FOR SLEEP FOR UP TO 90 DAYS; NOT TO EXCEED 6.25 MG         For Pharmacy Admin Tracking Only    Program: Medication Refill  CPA in place:    Recommendation Provided To:   Intervention Detail: New Rx: 2, reason: Patient Preference  Intervention Accepted By:   Gap Closed?:    Time Spent (min): 5

## 2024-02-12 DIAGNOSIS — F51.01 PRIMARY INSOMNIA: ICD-10-CM

## 2024-02-14 RX ORDER — ZOLPIDEM TARTRATE 6.25 MG/1
TABLET, FILM COATED, EXTENDED RELEASE ORAL
Qty: 90 TABLET | Refills: 0 | Status: SHIPPED | OUTPATIENT
Start: 2024-02-14 | End: 2024-02-16 | Stop reason: SDUPTHER

## 2024-02-14 NOTE — TELEPHONE ENCOUNTER
Last appointment: 1/31/24  Next appointment: 2/28/24  Previous refill encounter(s): 11/13/23 #90    Requested Prescriptions     Pending Prescriptions Disp Refills    zolpidem (AMBIEN CR) 6.25 MG extended release tablet [Pharmacy Med Name: ZOLPIDEM ER 6.25 MG TAB] 90 tablet 0     Sig: TAKE ONE TABLET BY MOUTH EVERY EVENING AS NEEDED SLEEP         For Pharmacy Admin Tracking Only    Program: Medication Refill  CPA in place:    Recommendation Provided To:   Intervention Detail: New Rx: 1, reason: Patient Preference  Intervention Accepted By:   Gap Closed?:    Time Spent (min): 5

## 2024-02-16 DIAGNOSIS — F51.01 PRIMARY INSOMNIA: ICD-10-CM

## 2024-02-16 DIAGNOSIS — F41.9 ANXIETY: ICD-10-CM

## 2024-02-16 RX ORDER — ZOLPIDEM TARTRATE 6.25 MG/1
TABLET, FILM COATED, EXTENDED RELEASE ORAL
Qty: 90 TABLET | Refills: 0 | Status: SHIPPED | OUTPATIENT
Start: 2024-02-16 | End: 2024-05-16

## 2024-02-19 DIAGNOSIS — F51.01 PRIMARY INSOMNIA: ICD-10-CM

## 2024-02-19 RX ORDER — ZOLPIDEM TARTRATE 6.25 MG/1
TABLET, FILM COATED, EXTENDED RELEASE ORAL
Qty: 90 TABLET | Refills: 0 | Status: SHIPPED | OUTPATIENT
Start: 2024-02-19 | End: 2024-05-19

## 2024-02-19 NOTE — TELEPHONE ENCOUNTER
Patient said that [facility] is out of this and is unable to order due to backorder. She is requesting this be sent to [facility] instead.        For Pharmacy Admin Tracking Only    Program: Medication Refill  CPA in place:    Recommendation Provided To:   Intervention Detail: New Rx: 1, reason: Patient Preference  Intervention Accepted By:   Gap Closed?:    Time Spent (min): 5

## 2024-02-28 ENCOUNTER — OFFICE VISIT (OUTPATIENT)
Age: 59
End: 2024-02-28

## 2024-02-28 VITALS
OXYGEN SATURATION: 97 % | TEMPERATURE: 98.3 F | BODY MASS INDEX: 36.74 KG/M2 | DIASTOLIC BLOOD PRESSURE: 85 MMHG | WEIGHT: 194.6 LBS | SYSTOLIC BLOOD PRESSURE: 131 MMHG | RESPIRATION RATE: 18 BRPM | HEIGHT: 61 IN | HEART RATE: 70 BPM

## 2024-02-28 DIAGNOSIS — K21.9 GASTRO-ESOPHAGEAL REFLUX DISEASE WITHOUT ESOPHAGITIS: ICD-10-CM

## 2024-02-28 DIAGNOSIS — M79.7 FIBROMYALGIA: ICD-10-CM

## 2024-02-28 DIAGNOSIS — F43.22 ADJUSTMENT DISORDER WITH ANXIETY: ICD-10-CM

## 2024-02-28 DIAGNOSIS — I10 ESSENTIAL (PRIMARY) HYPERTENSION: Primary | ICD-10-CM

## 2024-02-28 DIAGNOSIS — E66.9 NON MORBID OBESITY: ICD-10-CM

## 2024-02-28 DIAGNOSIS — Z79.899 ENCOUNTER FOR LONG-TERM (CURRENT) USE OF MEDICATIONS: ICD-10-CM

## 2024-02-28 DIAGNOSIS — F51.01 PRIMARY INSOMNIA: ICD-10-CM

## 2024-02-28 DIAGNOSIS — E11.9 TYPE 2 DIABETES MELLITUS WITHOUT COMPLICATION, WITHOUT LONG-TERM CURRENT USE OF INSULIN (HCC): ICD-10-CM

## 2024-02-28 DIAGNOSIS — E78.00 HYPERCHOLESTEROLEMIA: ICD-10-CM

## 2024-02-28 PROCEDURE — 3075F SYST BP GE 130 - 139MM HG: CPT | Performed by: FAMILY MEDICINE

## 2024-02-28 PROCEDURE — PBSHW COVID-19, PFIZER, (AGE 12Y+), IM, PF, 30MCG/0.3ML: Performed by: FAMILY MEDICINE

## 2024-02-28 PROCEDURE — 99213 OFFICE O/P EST LOW 20 MIN: CPT | Performed by: FAMILY MEDICINE

## 2024-02-28 PROCEDURE — 91320 SARSCV2 VAC 30MCG TRS-SUC IM: CPT | Performed by: FAMILY MEDICINE

## 2024-02-28 PROCEDURE — 3079F DIAST BP 80-89 MM HG: CPT | Performed by: FAMILY MEDICINE

## 2024-02-28 RX ORDER — CYCLOBENZAPRINE HCL 5 MG
5 TABLET ORAL 3 TIMES DAILY PRN
Qty: 30 TABLET | Refills: 1 | Status: SHIPPED | OUTPATIENT
Start: 2024-02-28

## 2024-02-28 RX ORDER — LOSARTAN POTASSIUM 50 MG/1
50 TABLET ORAL EVERY EVENING
Qty: 90 TABLET | Refills: 3 | Status: SHIPPED | OUTPATIENT
Start: 2024-02-28

## 2024-02-28 ASSESSMENT — PATIENT HEALTH QUESTIONNAIRE - PHQ9
SUM OF ALL RESPONSES TO PHQ9 QUESTIONS 1 & 2: 0
4. FEELING TIRED OR HAVING LITTLE ENERGY: 1
8. MOVING OR SPEAKING SO SLOWLY THAT OTHER PEOPLE COULD HAVE NOTICED. OR THE OPPOSITE, BEING SO FIGETY OR RESTLESS THAT YOU HAVE BEEN MOVING AROUND A LOT MORE THAN USUAL: 0
3. TROUBLE FALLING OR STAYING ASLEEP: 1
7. TROUBLE CONCENTRATING ON THINGS, SUCH AS READING THE NEWSPAPER OR WATCHING TELEVISION: 1
5. POOR APPETITE OR OVEREATING: 0
SUM OF ALL RESPONSES TO PHQ QUESTIONS 1-9: 3
2. FEELING DOWN, DEPRESSED OR HOPELESS: 0
SUM OF ALL RESPONSES TO PHQ QUESTIONS 1-9: 3
1. LITTLE INTEREST OR PLEASURE IN DOING THINGS: 0
9. THOUGHTS THAT YOU WOULD BE BETTER OFF DEAD, OR OF HURTING YOURSELF: 0
SUM OF ALL RESPONSES TO PHQ QUESTIONS 1-9: 3
SUM OF ALL RESPONSES TO PHQ QUESTIONS 1-9: 3
6. FEELING BAD ABOUT YOURSELF - OR THAT YOU ARE A FAILURE OR HAVE LET YOURSELF OR YOUR FAMILY DOWN: 0

## 2024-02-28 ASSESSMENT — ENCOUNTER SYMPTOMS
CONSTIPATION: 0
BLOOD IN STOOL: 0
CHEST TIGHTNESS: 0
RHINORRHEA: 0
SHORTNESS OF BREATH: 0
COUGH: 0
ABDOMINAL PAIN: 0

## 2024-02-28 NOTE — PROGRESS NOTES
Chief Complaint   Patient presents with    Follow-up     4 week       \"Have you been to the ER, urgent care clinic since your last visit?  Hospitalized since your last visit?\"    NO    “Have you seen or consulted any other health care providers outside of [facility] since your last visit?”    NO       Per orders of Dr. Archuleta, injection of COVID-19 Vaccine was given in the Right deltoid . Patient tolerated it well. Patient instructed to report any adverse reaction to me immediately.      Vitals:    24 1546   BP: 131/85   Pulse: 70   Resp: 18   Temp: 98.3 °F (36.8 °C)   SpO2: 97%       There are no preventive care reminders to display for this patient.     The patient, Nohemi Awan, identity was verified by name and .   
Subjective:      Patient ID: Nohemi Awan is a 58 y.o. female.    HPI  Follow up on chronic medical problems.    Cardiovascular Review:  The patient has hypertension.  Diet and Lifestyle: generally follows a low fat low cholesterol diet, generally follows a low sodium diet, exercises sporadically  Home BP Monitorins/80s  Pertinent ROS: taking medications as instructed, no medication side effects noted, no TIA's, no chest pain on exertion, no dyspnea on exertion, no swelling of ankles.  DM type II follow up:  A1c was 6.8% in 2023 visit. Was started on ozmepic but has been out if for a several weeks but recently restarted.  Compliant w/ meds, diabetic diet, and exercise.   Denies any tingling sensation, polyuria and polydipsia.  No blurred vision.  No significant weight changes.    Anxiety Review:  Patient is seen for sleep disturbance and anxiety.  Current treatment includes Xanax prn and Effexor.  Effexor initially was helping with hot flashes.    Anxiety has been improved.   No SI/HI    Ongoing symptoms include: insomnia.  She is using ambien for sleep.    Reported side effects from the treatment: none.   HM:  Mammo: b/l mastectomy  Colonoscopy: cant get d/t not having insurance for now.        Past Medical History:   Diagnosis Date   • Anemia 10/3/13    \"SINCE I WAS A KID\"   • Anxiety     DURING CHEMO; PT STAYS XANAX AS OF 10/3/13   • Cancer ([facility]) [id number]    breast,chemo ended 10/2012   • Chronic pain     si joints sacrum   • Depression    • Fibromyalgia    • Nausea & vomiting     used patch in past with good results   • Other ill-defined conditions(799.89)     anemia,chronic diarrhea, TMJ   • Ovarian cyst    • Palpitations     TAKES METOPROLOL FOR TACHYCARDIA   • PUD (peptic ulcer disease)     occurred at age 27 .   • Unspecified adverse effect of anesthesia     TMJ     Past Surgical History:   Procedure Laterality Date   • BREAST RECONSTRUCTION  2012    R SEROMA EXCISED, REPLACE 
PAIN 180 capsule 1    albuterol sulfate HFA (VENTOLIN HFA) 108 (90 Base) MCG/ACT inhaler Inhale 2 puffs into the lungs 4 times daily as needed for Wheezing 18 g 0    calcium carb-cholecalciferol 600-10 MG-MCG TABS per tab Take 1 tablet by mouth daily      cetirizine (ZYRTEC) 10 MG tablet Take by mouth daily as needed      diphenhydrAMINE (BENADRYL) 25 MG capsule Take by mouth every 6 hours as needed      mometasone (NASONEX) 50 MCG/ACT nasal spray 2 sprays by Nasal route daily as needed       No current facility-administered medications for this visit.      Family History   Problem Relation Age of Onset    Post-op Cognitive Dysfunction Neg Hx     Other Neg Hx     Emergence Delirium Neg Hx     Post-op Nausea/Vomiting Neg Hx     Delayed Awakening Neg Hx     Pseudochol. Deficiency Neg Hx     Malig Hypertherm Neg Hx     Breast Cancer Paternal Grandmother     No Known Problems Sister     Anxiety Disorder Brother     No Known Problems Brother     Breast Cancer Sister     Heart Disease Sister     Heart Attack Sister 48    COPD Father     Emphysema Father     Diabetes Father     Hypertension Father     Breast Cancer Mother 56    Cancer Mother 56         OF [place]      Social History     Socioeconomic History    Marital status: Single     Spouse name: None    Number of children: None    Years of education: None    Highest education level: None   Tobacco Use    Smoking status: Never     Passive exposure: Never    Smokeless tobacco: Never   Vaping Use    Vaping Use: Never used   Substance and Sexual Activity    Alcohol use: No     Alcohol/week: 0.0 standard drinks of alcohol    Drug use: No    Sexual activity: Yes     Social Determinants of Health     Financial Resource Strain: Low Risk  (2024)    Overall Financial Resource Strain (CARDIA)     Difficulty of Paying Living Expenses: Not hard at all   Food Insecurity: No Food Insecurity (2024)    Hunger Vital Sign     Worried About Running Out of Food in the Last

## 2024-02-29 LAB
ANION GAP SERPL CALC-SCNC: 7 MMOL/L (ref 5–15)
BUN SERPL-MCNC: 12 MG/DL (ref 6–20)
BUN/CREAT SERPL: 16 (ref 12–20)
CALCIUM SERPL-MCNC: 8.7 MG/DL (ref 8.5–10.1)
CHLORIDE SERPL-SCNC: 107 MMOL/L (ref 97–108)
CHOLEST SERPL-MCNC: 216 MG/DL
CO2 SERPL-SCNC: 29 MMOL/L (ref 21–32)
CREAT SERPL-MCNC: 0.75 MG/DL (ref 0.55–1.02)
GLUCOSE SERPL-MCNC: 102 MG/DL (ref 65–100)
HDLC SERPL-MCNC: 42 MG/DL
HDLC SERPL: 5.1 (ref 0–5)
LDLC SERPL CALC-MCNC: 147.6 MG/DL (ref 0–100)
POTASSIUM SERPL-SCNC: 4.1 MMOL/L (ref 3.5–5.1)
SODIUM SERPL-SCNC: 143 MMOL/L (ref 136–145)
TRIGL SERPL-MCNC: 132 MG/DL
TSH SERPL DL<=0.05 MIU/L-ACNC: 2.43 UIU/ML (ref 0.36–3.74)
VLDLC SERPL CALC-MCNC: 26.4 MG/DL

## 2024-02-29 RX ORDER — ATORVASTATIN CALCIUM 20 MG/1
20 TABLET, FILM COATED ORAL NIGHTLY
Qty: 30 TABLET | Refills: 3 | Status: SHIPPED | OUTPATIENT
Start: 2024-02-29

## 2024-03-05 DIAGNOSIS — F51.01 PRIMARY INSOMNIA: ICD-10-CM

## 2024-03-06 RX ORDER — ZOLPIDEM TARTRATE 6.25 MG/1
TABLET, FILM COATED, EXTENDED RELEASE ORAL
Qty: 90 TABLET | Refills: 0 | Status: SHIPPED | OUTPATIENT
Start: 2024-03-06 | End: 2024-06-04

## 2024-03-06 NOTE — TELEPHONE ENCOUNTER
[facility] is requesting a refill. Previous Rx was sent to [facility].    Last appointment: 2/28/24  Next appointment: 7/1/24    Requested Prescriptions     Pending Prescriptions Disp Refills    zolpidem (AMBIEN CR) 6.25 MG extended release tablet [Pharmacy Med Name: ZOLPIDEM ER 6.25 MG TAB] 90 tablet 0     Sig: TAKE ONE TABLET BY MOUTH EVERY NIGHT AS NEEDED FOR SLEEP FOR UP TO 90 DAYS; NOT TO EXCEED 6.25 MG         For Pharmacy Admin Tracking Only    Program: Medication Refill  CPA in place:    Recommendation Provided To:   Intervention Detail: New Rx: 1, reason: Patient Preference  Intervention Accepted By:   Gap Closed?:    Time Spent (min): 5

## 2024-04-26 DIAGNOSIS — K21.9 GASTRO-ESOPHAGEAL REFLUX DISEASE WITHOUT ESOPHAGITIS: ICD-10-CM

## 2024-04-26 RX ORDER — ATORVASTATIN CALCIUM 20 MG/1
20 TABLET, FILM COATED ORAL NIGHTLY
Qty: 30 TABLET | Refills: 2 | Status: SHIPPED | OUTPATIENT
Start: 2024-04-26

## 2024-04-29 RX ORDER — PANTOPRAZOLE SODIUM 40 MG/1
40 TABLET, DELAYED RELEASE ORAL DAILY
Qty: 30 TABLET | Refills: 0 | Status: SHIPPED | OUTPATIENT
Start: 2024-04-29

## 2024-04-29 RX ORDER — CELECOXIB 200 MG/1
CAPSULE ORAL
Qty: 60 CAPSULE | Refills: 0 | Status: SHIPPED | OUTPATIENT
Start: 2024-04-29

## 2024-04-29 NOTE — TELEPHONE ENCOUNTER
Last appointment: 2/28/24  Next appointment: 7/1/24  Previous refill encounter(s): 10/10/23 Celebrex #180 with 1 refill, 10/30/23 Protonix #90 with 1 refill    Requested Prescriptions     Pending Prescriptions Disp Refills    celecoxib (CELEBREX) 200 MG capsule [Pharmacy Med Name: CELECOXIB 200 MG CAP[*]] 60 capsule 0     Sig: TAKE ONE CAPSULE BY MOUTH TWICE A DAY AS NEEDED FOR PAIN    pantoprazole (PROTONIX) 40 MG tablet [Pharmacy Med Name: PANTOPRAZOLE SOD DR 40 MG TAB] 30 tablet 0     Sig: TAKE ONE TABLET BY MOUTH ONE TIME DAILY         For Pharmacy Admin Tracking Only    Program: Medication Refill  CPA in place:    Recommendation Provided To:   Intervention Detail: New Rx: 2, reason: Patient Preference  Intervention Accepted By:   Gap Closed?:    Time Spent (min): 5

## 2024-05-23 DIAGNOSIS — F51.01 PRIMARY INSOMNIA: ICD-10-CM

## 2024-05-23 RX ORDER — ZOLPIDEM TARTRATE 6.25 MG/1
TABLET, FILM COATED, EXTENDED RELEASE ORAL
Qty: 90 TABLET | Refills: 0 | Status: SHIPPED | OUTPATIENT
Start: 2024-05-23 | End: 2024-08-21

## 2024-06-11 ENCOUNTER — CLINICAL DOCUMENTATION (OUTPATIENT)
Age: 59
End: 2024-06-11

## 2024-06-11 NOTE — PROGRESS NOTES
Called and spoke to patient letting her know her patient assistance medications are here at the office and she may pick them up at her convenience. Patient voiced understanding.

## 2024-06-13 DIAGNOSIS — K21.9 GASTRO-ESOPHAGEAL REFLUX DISEASE WITHOUT ESOPHAGITIS: ICD-10-CM

## 2024-06-17 RX ORDER — PANTOPRAZOLE SODIUM 40 MG/1
40 TABLET, DELAYED RELEASE ORAL DAILY
Qty: 90 TABLET | Refills: 3 | Status: SHIPPED | OUTPATIENT
Start: 2024-06-17

## 2024-06-17 NOTE — TELEPHONE ENCOUNTER
Last appointment: 2/28/24  Next appointment: 7/1/24  Previous refill encounter(s): 4/29/24 #30    Requested Prescriptions     Pending Prescriptions Disp Refills    pantoprazole (PROTONIX) 40 MG tablet [Pharmacy Med Name: PANTOPRAZOLE DR 40 MG TAB[*]] 90 tablet 3     Sig: TAKE ONE TABLET BY MOUTH ONE TIME DAILY         For Pharmacy Admin Tracking Only    Program: Medication Refill  CPA in place:    Recommendation Provided To:   Intervention Detail: New Rx: 1, reason: Patient Preference  Intervention Accepted By:   Gap Closed?:    Time Spent (min): 5

## 2024-08-01 ENCOUNTER — ENROLLMENT (OUTPATIENT)
Age: 59
End: 2024-08-01

## 2024-08-01 ENCOUNTER — TELEPHONE (OUTPATIENT)
Age: 59
End: 2024-08-01

## 2024-08-01 NOTE — TELEPHONE ENCOUNTER
Pharmacy Progress Note - Patient Assistance     Received notification from [facility] that patient's medication Ozempic will be refilled on 8/30/24. Per chart review, patient remains on this medication at this time and there have not been any dose changes. No further action needed.  Refill notification letter has been uploaded to patient's chart.       Thank you,  Art Oden, PHARMD, [facility]  Clinical Pharmacist      For Pharmacy Admin Tracking Only    Program: Medical Group  CPA in place:  Yes  Time Spent (min): 5

## 2024-08-19 ENCOUNTER — OFFICE VISIT (OUTPATIENT)
Age: 59
End: 2024-08-19

## 2024-08-19 VITALS
HEART RATE: 76 BPM | SYSTOLIC BLOOD PRESSURE: 128 MMHG | TEMPERATURE: 98.5 F | HEIGHT: 61 IN | DIASTOLIC BLOOD PRESSURE: 84 MMHG | BODY MASS INDEX: 37.61 KG/M2 | OXYGEN SATURATION: 96 % | WEIGHT: 199.2 LBS | RESPIRATION RATE: 18 BRPM

## 2024-08-19 DIAGNOSIS — K21.9 GASTRO-ESOPHAGEAL REFLUX DISEASE WITHOUT ESOPHAGITIS: ICD-10-CM

## 2024-08-19 DIAGNOSIS — E11.9 TYPE 2 DIABETES MELLITUS WITHOUT COMPLICATION, WITHOUT LONG-TERM CURRENT USE OF INSULIN (HCC): ICD-10-CM

## 2024-08-19 DIAGNOSIS — F43.22 ADJUSTMENT DISORDER WITH ANXIETY: ICD-10-CM

## 2024-08-19 DIAGNOSIS — E78.00 HYPERCHOLESTEROLEMIA: ICD-10-CM

## 2024-08-19 DIAGNOSIS — Z79.899 ENCOUNTER FOR LONG-TERM (CURRENT) USE OF MEDICATIONS: ICD-10-CM

## 2024-08-19 DIAGNOSIS — E66.9 NON MORBID OBESITY: ICD-10-CM

## 2024-08-19 DIAGNOSIS — F51.01 PRIMARY INSOMNIA: ICD-10-CM

## 2024-08-19 DIAGNOSIS — I10 ESSENTIAL (PRIMARY) HYPERTENSION: Primary | ICD-10-CM

## 2024-08-19 DIAGNOSIS — M79.7 FIBROMYALGIA: ICD-10-CM

## 2024-08-19 LAB
ALBUMIN SERPL-MCNC: 3.7 G/DL (ref 3.5–5)
ALBUMIN/GLOB SERPL: 1 (ref 1.1–2.2)
ALP SERPL-CCNC: 130 U/L (ref 45–117)
ALT SERPL-CCNC: 27 U/L (ref 12–78)
ANION GAP SERPL CALC-SCNC: 7 MMOL/L (ref 5–15)
AST SERPL-CCNC: 19 U/L (ref 15–37)
BILIRUB SERPL-MCNC: 0.3 MG/DL (ref 0.2–1)
BUN SERPL-MCNC: 6 MG/DL (ref 6–20)
BUN/CREAT SERPL: 7 (ref 12–20)
CALCIUM SERPL-MCNC: 9.3 MG/DL (ref 8.5–10.1)
CHLORIDE SERPL-SCNC: 106 MMOL/L (ref 97–108)
CHOLEST SERPL-MCNC: 145 MG/DL
CO2 SERPL-SCNC: 27 MMOL/L (ref 21–32)
CREAT SERPL-MCNC: 0.83 MG/DL (ref 0.55–1.02)
ERYTHROCYTE [DISTWIDTH] IN BLOOD BY AUTOMATED COUNT: 13.8 % (ref 11.5–14.5)
GLOBULIN SER CALC-MCNC: 3.6 G/DL (ref 2–4)
GLUCOSE SERPL-MCNC: 143 MG/DL (ref 65–100)
GLUCOSE, POC: 154 MG/DL
HBA1C MFR BLD: 6.2 %
HCT VFR BLD AUTO: 38.2 % (ref 35–47)
HDLC SERPL-MCNC: 38 MG/DL
HDLC SERPL: 3.8 (ref 0–5)
HGB BLD-MCNC: 12.1 G/DL (ref 11.5–16)
LDLC SERPL CALC-MCNC: 85.2 MG/DL (ref 0–100)
MCH RBC QN AUTO: 26.4 PG (ref 26–34)
MCHC RBC AUTO-ENTMCNC: 31.7 G/DL (ref 30–36.5)
MCV RBC AUTO: 83.2 FL (ref 80–99)
NRBC # BLD: 0 K/UL (ref 0–0.01)
NRBC BLD-RTO: 0 PER 100 WBC
PLATELET # BLD AUTO: 290 K/UL (ref 150–400)
PMV BLD AUTO: 11.9 FL (ref 8.9–12.9)
POTASSIUM SERPL-SCNC: 4 MMOL/L (ref 3.5–5.1)
PROT SERPL-MCNC: 7.3 G/DL (ref 6.4–8.2)
RBC # BLD AUTO: 4.59 M/UL (ref 3.8–5.2)
SODIUM SERPL-SCNC: 140 MMOL/L (ref 136–145)
TRIGL SERPL-MCNC: 109 MG/DL
VLDLC SERPL CALC-MCNC: 21.8 MG/DL
WBC # BLD AUTO: 8 K/UL (ref 3.6–11)

## 2024-08-19 PROCEDURE — 99214 OFFICE O/P EST MOD 30 MIN: CPT | Performed by: FAMILY MEDICINE

## 2024-08-19 PROCEDURE — 83036 HEMOGLOBIN GLYCOSYLATED A1C: CPT | Performed by: FAMILY MEDICINE

## 2024-08-19 PROCEDURE — PBSHW AMB POC HEMOGLOBIN A1C: Performed by: FAMILY MEDICINE

## 2024-08-19 PROCEDURE — 3079F DIAST BP 80-89 MM HG: CPT | Performed by: FAMILY MEDICINE

## 2024-08-19 PROCEDURE — 82962 GLUCOSE BLOOD TEST: CPT | Performed by: FAMILY MEDICINE

## 2024-08-19 PROCEDURE — 3074F SYST BP LT 130 MM HG: CPT | Performed by: FAMILY MEDICINE

## 2024-08-19 PROCEDURE — PBSHW AMB POC GLUCOSE BLOOD, BY GLUCOSE MONITORING DEVICE: Performed by: FAMILY MEDICINE

## 2024-08-19 ASSESSMENT — ENCOUNTER SYMPTOMS
CHEST TIGHTNESS: 0
COUGH: 0
BLOOD IN STOOL: 0
ABDOMINAL PAIN: 0
RHINORRHEA: 0
CONSTIPATION: 0
SHORTNESS OF BREATH: 0

## 2024-08-19 ASSESSMENT — PATIENT HEALTH QUESTIONNAIRE - PHQ9
4. FEELING TIRED OR HAVING LITTLE ENERGY: SEVERAL DAYS
1. LITTLE INTEREST OR PLEASURE IN DOING THINGS: NOT AT ALL
SUM OF ALL RESPONSES TO PHQ9 QUESTIONS 1 & 2: 0
SUM OF ALL RESPONSES TO PHQ QUESTIONS 1-9: 2
SUM OF ALL RESPONSES TO PHQ QUESTIONS 1-9: 2
8. MOVING OR SPEAKING SO SLOWLY THAT OTHER PEOPLE COULD HAVE NOTICED. OR THE OPPOSITE, BEING SO FIGETY OR RESTLESS THAT YOU HAVE BEEN MOVING AROUND A LOT MORE THAN USUAL: NOT AT ALL
2. FEELING DOWN, DEPRESSED OR HOPELESS: NOT AT ALL
7. TROUBLE CONCENTRATING ON THINGS, SUCH AS READING THE NEWSPAPER OR WATCHING TELEVISION: SEVERAL DAYS
6. FEELING BAD ABOUT YOURSELF - OR THAT YOU ARE A FAILURE OR HAVE LET YOURSELF OR YOUR FAMILY DOWN: NOT AT ALL
3. TROUBLE FALLING OR STAYING ASLEEP: NOT AT ALL
SUM OF ALL RESPONSES TO PHQ QUESTIONS 1-9: 2
9. THOUGHTS THAT YOU WOULD BE BETTER OFF DEAD, OR OF HURTING YOURSELF: NOT AT ALL
5. POOR APPETITE OR OVEREATING: NOT AT ALL
SUM OF ALL RESPONSES TO PHQ QUESTIONS 1-9: 2

## 2024-08-19 NOTE — PROGRESS NOTES
Subjective:      Patient ID: Nohemi Awan is a 59 y.o. female.    HPI  Follow up on chronic medical problems.  Overall feeling well.   Cardiovascular Review:  The patient has hypertension.  Tolerating losartan   Diet and Lifestyle: generally follows a low fat low cholesterol diet, generally follows a low sodium diet, exercises sporadically  Home BP Monitorin-140s/80s  Pertinent ROS: taking medications as instructed, no medication side effects noted, no TIA's, no chest pain on exertion, no dyspnea on exertion, no swelling of ankles.  DM type II follow up:  A1c was 6.8% in 2023 visit. Was started on ozmepic but stopped medication earlier this year d/t abd pain and constipation.  Has tried to change some of her eating habits to help better with her BS. .   Compliant w/ diabetic diet and exercise.   Denies any tingling sensation, polyuria and polydipsia.  No blurred vision.  No significant weight changes.    Hypercholesterolemia follow up:  Compliant w/ meds, low fat, low cholesterol diet.  She started on the lipitor at the end of April.  Exercising some.  No muscle nor abdominal pain, no skin discoloration.  Patient fasting today.  Anxiety Review:  Patient is seen for sleep disturbance and anxiety.  Current treatment includes Xanax prn and Effexor.  Effexor initially was helping with hot flashes.    Anxiety has been improved.   No SI/HI    Ongoing symptoms include: insomnia.  She is using ambien for sleep.    Reported side effects from the treatment: none.   HM:  Mammo: b/l mastectomy  Colonoscopy: cant get d/t not having insurance for now.        Past Medical History:   Diagnosis Date    Anemia 10/3/13    \"SINCE I WAS A KID\"    Anxiety     DURING CHEMO; PT STAYS XANAX AS OF 10/3/13    Cancer ([facility]) [phone]    breast,chemo ended 10/2012    Chronic pain     si joints sacrum    Depression     Fibromyalgia     Nausea & vomiting     used patch in past with good results    Other ill-defined

## 2024-08-19 NOTE — PROGRESS NOTES
Chief Complaint   Patient presents with    Follow-up     Patient is here today for a 4 month diabetes check.         \"Have you been to the ER, urgent care clinic since your last visit?  Hospitalized since your last visit?\"    NO    “Have you seen or consulted any other health care providers outside of [facility] since your last visit?”    NO      Vitals:    24 1541 24 1548   BP: (!) 152/87 (!) 151/82   Site: Right Upper Arm Left Upper Arm   Position: Sitting Sitting   Cuff Size: Large Adult Large Adult   Pulse: 76    Resp: 18    Temp: 98.5 °F (36.9 °C)    TempSrc: Oral    SpO2: 96%    Weight: 90.4 kg (199 lb 3.2 oz)    Height: 1.549 m (5' 1\")           Click Here for Release of Records Request      There were no vitals filed for this visit.    Health Maintenance Due   Topic Date Due    Diabetic foot exam  Never done    Flu vaccine (1) 2024        The patient, Nohemi Awan, identity was verified by name and .

## 2024-08-21 ENCOUNTER — TELEPHONE (OUTPATIENT)
Age: 59
End: 2024-08-21

## 2024-08-21 NOTE — TELEPHONE ENCOUNTER
Called and spoke to patient. Patient states that she is no longer taking as it made her very ill.

## 2024-08-22 DIAGNOSIS — I10 ESSENTIAL (PRIMARY) HYPERTENSION: ICD-10-CM

## 2024-08-23 RX ORDER — METOPROLOL SUCCINATE 25 MG/1
TABLET, EXTENDED RELEASE ORAL
Qty: 90 TABLET | Refills: 3 | Status: SHIPPED | OUTPATIENT
Start: 2024-08-23

## 2024-08-23 NOTE — TELEPHONE ENCOUNTER
Last appointment: 8/19/24  Next appointment: 11/22/24  Previous refill encounter(s): 10/30/23 #90 with 3 refills    Requested Prescriptions     Pending Prescriptions Disp Refills    metoprolol succinate (TOPROL XL) 25 MG extended release tablet [Pharmacy Med Name: METOPROLOL SUCC ER 25 MG TAB] 90 tablet 3     Sig: TAKE ONE TABLET BY MOUTH ONE TIME DAILY IN THE EVENING         For Pharmacy Admin Tracking Only    Program: Medication Refill  CPA in place:    Recommendation Provided To:   Intervention Detail: New Rx: 1, reason: Patient Preference  Intervention Accepted By:   Gap Closed?:    Time Spent (min): 5

## 2024-09-17 ENCOUNTER — TELEPHONE (OUTPATIENT)
Age: 59
End: 2024-09-17

## 2024-09-26 DIAGNOSIS — F51.01 PRIMARY INSOMNIA: ICD-10-CM

## 2024-09-27 RX ORDER — ZOLPIDEM TARTRATE 6.25 MG/1
TABLET, FILM COATED, EXTENDED RELEASE ORAL
Qty: 90 TABLET | Refills: 0 | Status: SHIPPED | OUTPATIENT
Start: 2024-09-27 | End: 2024-12-26

## 2025-01-20 DIAGNOSIS — F51.01 PRIMARY INSOMNIA: ICD-10-CM

## 2025-01-22 RX ORDER — ZOLPIDEM TARTRATE 6.25 MG/1
TABLET, FILM COATED, EXTENDED RELEASE ORAL
Qty: 90 TABLET | Refills: 0 | Status: SHIPPED | OUTPATIENT
Start: 2025-01-22 | End: 2025-07-21

## 2025-01-22 NOTE — TELEPHONE ENCOUNTER
Last appointment: 8/19/24  Next appointment: 1/28/25  Previous refill encounter(s): 9/27/24 #90    Requested Prescriptions     Pending Prescriptions Disp Refills    zolpidem (AMBIEN CR) 6.25 MG extended release tablet [Pharmacy Med Name: ZOLPIDEM ER 6.25 MG TAB] 90 tablet 1     Sig: TAKE ONE TABLET BY MOUTH EVERY NIGHT AS NEEDED FOR SLEEP. DO NOT EXCEED 1 TABLET DAILY         For Pharmacy Admin Tracking Only    Program: Medication Refill  CPA in place:    Recommendation Provided To:   Intervention Detail: New Rx: 1, reason: Patient Preference  Intervention Accepted By:   Gap Closed?:    Time Spent (min): 5

## 2025-01-28 ENCOUNTER — OFFICE VISIT (OUTPATIENT)
Age: 60
End: 2025-01-28

## 2025-01-28 VITALS
DIASTOLIC BLOOD PRESSURE: 75 MMHG | OXYGEN SATURATION: 96 % | RESPIRATION RATE: 18 BRPM | SYSTOLIC BLOOD PRESSURE: 144 MMHG | HEART RATE: 64 BPM | BODY MASS INDEX: 38.59 KG/M2 | HEIGHT: 61 IN | TEMPERATURE: 98.4 F | WEIGHT: 204.4 LBS

## 2025-01-28 DIAGNOSIS — E78.00 HYPERCHOLESTEROLEMIA: ICD-10-CM

## 2025-01-28 DIAGNOSIS — E66.9 NON MORBID OBESITY: ICD-10-CM

## 2025-01-28 DIAGNOSIS — F43.22 ADJUSTMENT DISORDER WITH ANXIETY: ICD-10-CM

## 2025-01-28 DIAGNOSIS — L24.9 IRRITANT CONTACT DERMATITIS OF SCALP: ICD-10-CM

## 2025-01-28 DIAGNOSIS — K21.9 GASTRO-ESOPHAGEAL REFLUX DISEASE WITHOUT ESOPHAGITIS: ICD-10-CM

## 2025-01-28 DIAGNOSIS — I10 ESSENTIAL (PRIMARY) HYPERTENSION: Primary | ICD-10-CM

## 2025-01-28 DIAGNOSIS — F51.01 PRIMARY INSOMNIA: ICD-10-CM

## 2025-01-28 DIAGNOSIS — Z79.899 ENCOUNTER FOR LONG-TERM (CURRENT) USE OF MEDICATIONS: ICD-10-CM

## 2025-01-28 DIAGNOSIS — E11.9 TYPE 2 DIABETES MELLITUS WITHOUT COMPLICATION, WITHOUT LONG-TERM CURRENT USE OF INSULIN (HCC): ICD-10-CM

## 2025-01-28 DIAGNOSIS — M79.7 FIBROMYALGIA: ICD-10-CM

## 2025-01-28 LAB
GLUCOSE, POC: 162 MG/DL
HBA1C MFR BLD: 6.2 %

## 2025-01-28 PROCEDURE — PBSHW AMB POC HEMOGLOBIN A1C: Performed by: FAMILY MEDICINE

## 2025-01-28 PROCEDURE — 99214 OFFICE O/P EST MOD 30 MIN: CPT | Performed by: FAMILY MEDICINE

## 2025-01-28 PROCEDURE — 3077F SYST BP >= 140 MM HG: CPT | Performed by: FAMILY MEDICINE

## 2025-01-28 PROCEDURE — 83036 HEMOGLOBIN GLYCOSYLATED A1C: CPT | Performed by: FAMILY MEDICINE

## 2025-01-28 PROCEDURE — 82962 GLUCOSE BLOOD TEST: CPT | Performed by: FAMILY MEDICINE

## 2025-01-28 PROCEDURE — 3078F DIAST BP <80 MM HG: CPT | Performed by: FAMILY MEDICINE

## 2025-01-28 PROCEDURE — PBSHW AMB POC GLUCOSE BLOOD, BY GLUCOSE MONITORING DEVICE: Performed by: FAMILY MEDICINE

## 2025-01-28 RX ORDER — PANTOPRAZOLE SODIUM 40 MG/1
40 TABLET, DELAYED RELEASE ORAL DAILY
Qty: 90 TABLET | Refills: 3 | Status: SHIPPED | OUTPATIENT
Start: 2025-01-28

## 2025-01-28 RX ORDER — ATORVASTATIN CALCIUM 20 MG/1
20 TABLET, FILM COATED ORAL NIGHTLY
Qty: 90 TABLET | Refills: 3 | Status: SHIPPED | OUTPATIENT
Start: 2025-01-28

## 2025-01-28 SDOH — ECONOMIC STABILITY: FOOD INSECURITY: WITHIN THE PAST 12 MONTHS, YOU WORRIED THAT YOUR FOOD WOULD RUN OUT BEFORE YOU GOT MONEY TO BUY MORE.: NEVER TRUE

## 2025-01-28 SDOH — ECONOMIC STABILITY: FOOD INSECURITY: WITHIN THE PAST 12 MONTHS, THE FOOD YOU BOUGHT JUST DIDN'T LAST AND YOU DIDN'T HAVE MONEY TO GET MORE.: NEVER TRUE

## 2025-01-28 ASSESSMENT — ENCOUNTER SYMPTOMS
RHINORRHEA: 0
CHEST TIGHTNESS: 0
BLOOD IN STOOL: 0
ABDOMINAL PAIN: 0
CONSTIPATION: 0
SHORTNESS OF BREATH: 0
COUGH: 0

## 2025-01-28 ASSESSMENT — PATIENT HEALTH QUESTIONNAIRE - PHQ9
1. LITTLE INTEREST OR PLEASURE IN DOING THINGS: NOT AT ALL
SUM OF ALL RESPONSES TO PHQ QUESTIONS 1-9: 1
6. FEELING BAD ABOUT YOURSELF - OR THAT YOU ARE A FAILURE OR HAVE LET YOURSELF OR YOUR FAMILY DOWN: NOT AT ALL
2. FEELING DOWN, DEPRESSED OR HOPELESS: NOT AT ALL
SUM OF ALL RESPONSES TO PHQ QUESTIONS 1-9: 1
3. TROUBLE FALLING OR STAYING ASLEEP: NOT AT ALL
4. FEELING TIRED OR HAVING LITTLE ENERGY: NOT AT ALL
9. THOUGHTS THAT YOU WOULD BE BETTER OFF DEAD, OR OF HURTING YOURSELF: NOT AT ALL
5. POOR APPETITE OR OVEREATING: NOT AT ALL
7. TROUBLE CONCENTRATING ON THINGS, SUCH AS READING THE NEWSPAPER OR WATCHING TELEVISION: SEVERAL DAYS
SUM OF ALL RESPONSES TO PHQ QUESTIONS 1-9: 1
SUM OF ALL RESPONSES TO PHQ9 QUESTIONS 1 & 2: 0
SUM OF ALL RESPONSES TO PHQ QUESTIONS 1-9: 1
8. MOVING OR SPEAKING SO SLOWLY THAT OTHER PEOPLE COULD HAVE NOTICED. OR THE OPPOSITE, BEING SO FIGETY OR RESTLESS THAT YOU HAVE BEEN MOVING AROUND A LOT MORE THAN USUAL: NOT AT ALL

## 2025-01-28 NOTE — PROGRESS NOTES
Chief Complaint   Patient presents with    Follow-up       \"Have you been to the ER, urgent care clinic since your last visit?  Hospitalized since your last visit?\"    NO    “Have you seen or consulted any other health care providers outside of [facility] since your last visit?”    NO        “Have you had a colorectal cancer screening such as a colonoscopy/FIT/Cologuard?    NO    Date of last Colonoscopy: 2011  No cologuard on file  Date of last FIT: 2021   No flexible sigmoidoscopy on file     Vitals:    25 1543 25 1553   BP: (!) 143/70 (!) 144/75   Site: Right Upper Arm Right Upper Arm   Position: Sitting Sitting   Cuff Size: Large Adult Large Adult   Pulse: 64    Resp: 18    Temp: 98.4 °F (36.9 °C)    TempSrc: Oral    SpO2: 96%    Weight: 92.7 kg (204 lb 6.4 oz)    Height: 1.549 m (5' 1\")           Click Here for Release of Records Request      There were no vitals filed for this visit.    Health Maintenance Due   Topic Date Due    Diabetic foot exam  Never done    Diabetic Alb to Cr ratio (uACR) test  Never done    Diabetic retinal exam  Never done    Hepatitis B vaccine (1 of 3 - 19+ 3-dose series) Never done    Shingles vaccine (2 of 2) 12/15/2020    Flu vaccine (1) 2024    Colorectal Cancer Screen  10/30/2024        The patient, Nohemi Awan, identity was verified by name and .

## 2025-01-28 NOTE — PROGRESS NOTES
Subjective:      Patient ID: Nohemi Awan is a 59 y.o. female.    HPI  Follow up on chronic medical problems.  Overall feeling well.   Cardiovascular Review:  The patient has hypertension.  Tolerating losartan   Diet and Lifestyle: generally follows a low fat low cholesterol diet, generally follows a low sodium diet, exercises sporadically  Home BP Monitorin-140s/80s  Pertinent ROS: taking medications as instructed, no medication side effects noted, no TIA's, no chest pain on exertion, no dyspnea on exertion, no swelling of ankles.  DM type II follow up:  A1c was 6.8% in 2023 visit. Was started on ozmepic but stopped medication earlier this year d/t abd pain and constipation.  Has tried to change some of her eating habits to help better with her BS. .   Compliant w/ diabetic diet most of the time but weight is up overall by 10 pounds in the past 1 year. Has not been doing as much exercise.     Denies any tingling sensation, polyuria and polydipsia.  No blurred vision.  No significant weight changes.    Hypercholesterolemia follow up:  Compliant w/ meds, low fat, low cholesterol diet.  She started on the lipitor at the end of April.  Exercising some.  No muscle nor abdominal pain, no skin discoloration.  Patient fasting today.  Anxiety Review:  Patient is seen for sleep disturbance and anxiety.  Current treatment includes Xanax prn and Effexor.  Effexor initially was helping with hot flashes.    Anxiety has been improved.   No SI/HI    Ongoing symptoms include: insomnia.  She is using ambien for sleep.    Reported side effects from the treatment: none.   HM:  Mammo: b/l mastectomy  Colonoscopy: cant get d/t not having insurance for now.        Past Medical History:   Diagnosis Date    Anemia 10/3/13    \"SINCE I WAS A KID\"    Anxiety     DURING CHEMO; PT STAYS XANAX AS OF 10/3/13    Cancer ([facility]) [phone]    breast,chemo ended 10/2012    Chronic pain     si joints sacrum    Depression

## 2025-02-18 DIAGNOSIS — I10 ESSENTIAL (PRIMARY) HYPERTENSION: ICD-10-CM

## 2025-02-19 RX ORDER — LOSARTAN POTASSIUM 50 MG/1
50 TABLET ORAL NIGHTLY
Qty: 90 TABLET | Refills: 3 | Status: SHIPPED | OUTPATIENT
Start: 2025-02-19

## 2025-02-19 NOTE — TELEPHONE ENCOUNTER
Last appointment: 1/28/25  Next appointment: 5/28/25  Previous refill encounter(s): 2/28/24    Requested Prescriptions     Pending Prescriptions Disp Refills    losartan (COZAAR) 50 MG tablet [Pharmacy Med Name: LOSARTAN 50 MG TAB[*]] 90 tablet 3     Sig: TAKE ONE TABLET BY MOUTH EVERY EVENING         For Pharmacy Admin Tracking Only    Program: Medication Refill  CPA in place:    Recommendation Provided To:   Intervention Detail: New Rx: 1, reason: Patient Preference  Intervention Accepted By:   Gap Closed?:    Time Spent (min): 5

## 2025-03-04 RX ORDER — VENLAFAXINE HYDROCHLORIDE 75 MG/1
CAPSULE, EXTENDED RELEASE ORAL
Qty: 180 CAPSULE | Refills: 3 | Status: SHIPPED | OUTPATIENT
Start: 2025-03-04

## 2025-03-04 NOTE — TELEPHONE ENCOUNTER
Last appointment: 1/28/25  Next appointment: 5/28/25  Previous refill encounter(s): 2/5/24    Requested Prescriptions     Pending Prescriptions Disp Refills    venlafaxine (EFFEXOR XR) 75 MG extended release capsule [Pharmacy Med Name: VENLAFAXINE ER 75 MG CAP[*]] 180 capsule 3     Sig: TAKE TWO CAPSULES BY MOUTH ONE TIME DAILY         For Pharmacy Admin Tracking Only    Program: Medication Refill  CPA in place:    Recommendation Provided To:   Intervention Detail: New Rx: 1, reason: Patient Preference  Intervention Accepted By:   Gap Closed?:    Time Spent (min): 5

## 2025-04-14 RX ORDER — HYDROCHLOROTHIAZIDE 12.5 MG/1
CAPSULE ORAL
Qty: 2 EACH | Refills: 5 | Status: SHIPPED | OUTPATIENT
Start: 2025-04-14

## 2025-05-01 RX ORDER — HYDROCHLOROTHIAZIDE 12.5 MG/1
CAPSULE ORAL
Qty: 2 EACH | Refills: 0 | Status: SHIPPED | OUTPATIENT
Start: 2025-05-01

## 2025-05-29 DIAGNOSIS — M79.7 FIBROMYALGIA: ICD-10-CM

## 2025-05-29 RX ORDER — CELECOXIB 200 MG/1
CAPSULE ORAL
Qty: 60 CAPSULE | Refills: 0 | Status: SHIPPED | OUTPATIENT
Start: 2025-05-29

## 2025-05-29 RX ORDER — CYCLOBENZAPRINE HCL 5 MG
5 TABLET ORAL 3 TIMES DAILY PRN
Qty: 30 TABLET | Refills: 1 | Status: SHIPPED | OUTPATIENT
Start: 2025-05-29

## 2025-05-29 NOTE — TELEPHONE ENCOUNTER
Last appointment: 1/28/25  Next appointment: 6/2/25, 7/7/25  Previous refill encounter(s): 4/29/24    Requested Prescriptions     Pending Prescriptions Disp Refills    celecoxib (CELEBREX) 200 MG capsule [Pharmacy Med Name: CELECOXIB 200 MG CAP] 60 capsule 0     Sig: TAKE ONE CAPSULE BY MOUTH TWICE A DAY AS NEEDED FOR PAIN         For Pharmacy Admin Tracking Only    Program: Medication Refill  CPA in place:    Recommendation Provided To:   Intervention Detail: New Rx: 1, reason: Patient Preference  Intervention Accepted By:   Gap Closed?:    Time Spent (min): 5

## 2025-06-02 ENCOUNTER — OFFICE VISIT (OUTPATIENT)
Age: 60
End: 2025-06-02
Payer: COMMERCIAL

## 2025-06-02 VITALS
TEMPERATURE: 97.2 F | OXYGEN SATURATION: 99 % | SYSTOLIC BLOOD PRESSURE: 112 MMHG | WEIGHT: 196.6 LBS | HEART RATE: 69 BPM | DIASTOLIC BLOOD PRESSURE: 66 MMHG | BODY MASS INDEX: 37.12 KG/M2 | HEIGHT: 61 IN | RESPIRATION RATE: 21 BRPM

## 2025-06-02 DIAGNOSIS — F43.22 ADJUSTMENT DISORDER WITH ANXIETY: ICD-10-CM

## 2025-06-02 DIAGNOSIS — E78.00 HYPERCHOLESTEROLEMIA: ICD-10-CM

## 2025-06-02 DIAGNOSIS — F51.01 PRIMARY INSOMNIA: ICD-10-CM

## 2025-06-02 DIAGNOSIS — Z79.899 ENCOUNTER FOR LONG-TERM (CURRENT) USE OF MEDICATIONS: ICD-10-CM

## 2025-06-02 DIAGNOSIS — I10 ESSENTIAL (PRIMARY) HYPERTENSION: Primary | ICD-10-CM

## 2025-06-02 DIAGNOSIS — E66.9 NON MORBID OBESITY: ICD-10-CM

## 2025-06-02 DIAGNOSIS — E11.9 TYPE 2 DIABETES MELLITUS WITHOUT COMPLICATION, WITHOUT LONG-TERM CURRENT USE OF INSULIN (HCC): ICD-10-CM

## 2025-06-02 DIAGNOSIS — K21.9 GASTRO-ESOPHAGEAL REFLUX DISEASE WITHOUT ESOPHAGITIS: ICD-10-CM

## 2025-06-02 DIAGNOSIS — M79.7 FIBROMYALGIA: ICD-10-CM

## 2025-06-02 LAB
ALBUMIN SERPL-MCNC: 3.9 G/DL (ref 3.5–5)
ALBUMIN/GLOB SERPL: 1.2 (ref 1.1–2.2)
ALP SERPL-CCNC: 126 U/L (ref 45–117)
ALT SERPL-CCNC: 33 U/L (ref 12–78)
ANION GAP SERPL CALC-SCNC: 8 MMOL/L (ref 2–12)
APPEARANCE UR: CLEAR
AST SERPL-CCNC: 13 U/L (ref 15–37)
BACTERIA URNS QL MICRO: NEGATIVE /HPF
BILIRUB SERPL-MCNC: 0.3 MG/DL (ref 0.2–1)
BILIRUB UR QL: NEGATIVE
BUN SERPL-MCNC: 11 MG/DL (ref 6–20)
BUN/CREAT SERPL: 14 (ref 12–20)
CALCIUM SERPL-MCNC: 9.6 MG/DL (ref 8.5–10.1)
CHLORIDE SERPL-SCNC: 105 MMOL/L (ref 97–108)
CHOLEST SERPL-MCNC: 119 MG/DL
CO2 SERPL-SCNC: 26 MMOL/L (ref 21–32)
COLOR UR: NORMAL
CREAT SERPL-MCNC: 0.76 MG/DL (ref 0.55–1.02)
EPITH CASTS URNS QL MICRO: NORMAL /LPF
ERYTHROCYTE [DISTWIDTH] IN BLOOD BY AUTOMATED COUNT: 13.6 % (ref 11.5–14.5)
GLOBULIN SER CALC-MCNC: 3.3 G/DL (ref 2–4)
GLUCOSE SERPL-MCNC: 132 MG/DL (ref 65–100)
GLUCOSE UR STRIP.AUTO-MCNC: NEGATIVE MG/DL
GLUCOSE, POC: 141 MG/DL
HBA1C MFR BLD: 6 %
HCT VFR BLD AUTO: 39 % (ref 35–47)
HDLC SERPL-MCNC: 44 MG/DL
HDLC SERPL: 2.7 (ref 0–5)
HGB BLD-MCNC: 11.8 G/DL (ref 11.5–16)
HGB UR QL STRIP: NEGATIVE
HYALINE CASTS URNS QL MICRO: NORMAL /LPF (ref 0–5)
KETONES UR QL STRIP.AUTO: NEGATIVE MG/DL
LDLC SERPL CALC-MCNC: 55.8 MG/DL (ref 0–100)
LEUKOCYTE ESTERASE UR QL STRIP.AUTO: NEGATIVE
MCH RBC QN AUTO: 25.7 PG (ref 26–34)
MCHC RBC AUTO-ENTMCNC: 30.3 G/DL (ref 30–36.5)
MCV RBC AUTO: 84.8 FL (ref 80–99)
NITRITE UR QL STRIP.AUTO: NEGATIVE
NRBC # BLD: 0 K/UL (ref 0–0.01)
NRBC BLD-RTO: 0 PER 100 WBC
PH UR STRIP: 5 (ref 5–8)
PLATELET # BLD AUTO: 290 K/UL (ref 150–400)
PMV BLD AUTO: 12 FL (ref 8.9–12.9)
POTASSIUM SERPL-SCNC: 4 MMOL/L (ref 3.5–5.1)
PROT SERPL-MCNC: 7.2 G/DL (ref 6.4–8.2)
PROT UR STRIP-MCNC: NEGATIVE MG/DL
RBC # BLD AUTO: 4.6 M/UL (ref 3.8–5.2)
RBC #/AREA URNS HPF: NORMAL /HPF (ref 0–5)
SODIUM SERPL-SCNC: 139 MMOL/L (ref 136–145)
SP GR UR REFRACTOMETRY: 1.02 (ref 1–1.03)
SPECIMEN HOLD: NORMAL
TRIGL SERPL-MCNC: 96 MG/DL
UROBILINOGEN UR QL STRIP.AUTO: 0.2 EU/DL (ref 0.2–1)
VLDLC SERPL CALC-MCNC: 19.2 MG/DL
WBC # BLD AUTO: 8.1 K/UL (ref 3.6–11)
WBC URNS QL MICRO: NORMAL /HPF (ref 0–4)

## 2025-06-02 PROCEDURE — 3078F DIAST BP <80 MM HG: CPT | Performed by: FAMILY MEDICINE

## 2025-06-02 PROCEDURE — 99214 OFFICE O/P EST MOD 30 MIN: CPT | Performed by: FAMILY MEDICINE

## 2025-06-02 PROCEDURE — 3044F HG A1C LEVEL LT 7.0%: CPT | Performed by: FAMILY MEDICINE

## 2025-06-02 PROCEDURE — 83036 HEMOGLOBIN GLYCOSYLATED A1C: CPT | Performed by: FAMILY MEDICINE

## 2025-06-02 PROCEDURE — 3074F SYST BP LT 130 MM HG: CPT | Performed by: FAMILY MEDICINE

## 2025-06-02 PROCEDURE — 82962 GLUCOSE BLOOD TEST: CPT | Performed by: FAMILY MEDICINE

## 2025-06-02 PROCEDURE — 90471 IMMUNIZATION ADMIN: CPT | Performed by: FAMILY MEDICINE

## 2025-06-02 PROCEDURE — 90750 HZV VACC RECOMBINANT IM: CPT | Performed by: FAMILY MEDICINE

## 2025-06-02 RX ORDER — EPINEPHRINE 0.3 MG/.3ML
INJECTION SUBCUTANEOUS
Qty: 2 EACH | Refills: 3 | Status: SHIPPED | OUTPATIENT
Start: 2025-06-02

## 2025-06-02 SDOH — ECONOMIC STABILITY: FOOD INSECURITY: WITHIN THE PAST 12 MONTHS, YOU WORRIED THAT YOUR FOOD WOULD RUN OUT BEFORE YOU GOT MONEY TO BUY MORE.: NEVER TRUE

## 2025-06-02 SDOH — ECONOMIC STABILITY: FOOD INSECURITY: WITHIN THE PAST 12 MONTHS, THE FOOD YOU BOUGHT JUST DIDN'T LAST AND YOU DIDN'T HAVE MONEY TO GET MORE.: NEVER TRUE

## 2025-06-02 ASSESSMENT — PATIENT HEALTH QUESTIONNAIRE - PHQ9
8. MOVING OR SPEAKING SO SLOWLY THAT OTHER PEOPLE COULD HAVE NOTICED. OR THE OPPOSITE, BEING SO FIGETY OR RESTLESS THAT YOU HAVE BEEN MOVING AROUND A LOT MORE THAN USUAL: NOT AT ALL
2. FEELING DOWN, DEPRESSED OR HOPELESS: NOT AT ALL
8. MOVING OR SPEAKING SO SLOWLY THAT OTHER PEOPLE COULD HAVE NOTICED. OR THE OPPOSITE, BEING SO FIGETY OR RESTLESS THAT YOU HAVE BEEN MOVING AROUND A LOT MORE THAN USUAL: NOT AT ALL
1. LITTLE INTEREST OR PLEASURE IN DOING THINGS: NOT AT ALL
4. FEELING TIRED OR HAVING LITTLE ENERGY: NOT AT ALL
6. FEELING BAD ABOUT YOURSELF - OR THAT YOU ARE A FAILURE OR HAVE LET YOURSELF OR YOUR FAMILY DOWN: NOT AT ALL
10. IF YOU CHECKED OFF ANY PROBLEMS, HOW DIFFICULT HAVE THESE PROBLEMS MADE IT FOR YOU TO DO YOUR WORK, TAKE CARE OF THINGS AT HOME, OR GET ALONG WITH OTHER PEOPLE: NOT DIFFICULT AT ALL
SUM OF ALL RESPONSES TO PHQ QUESTIONS 1-9: 0
SUM OF ALL RESPONSES TO PHQ QUESTIONS 1-9: 0
9. THOUGHTS THAT YOU WOULD BE BETTER OFF DEAD, OR OF HURTING YOURSELF: NOT AT ALL
10. IF YOU CHECKED OFF ANY PROBLEMS, HOW DIFFICULT HAVE THESE PROBLEMS MADE IT FOR YOU TO DO YOUR WORK, TAKE CARE OF THINGS AT HOME, OR GET ALONG WITH OTHER PEOPLE: NOT DIFFICULT AT ALL
3. TROUBLE FALLING OR STAYING ASLEEP: NOT AT ALL
SUM OF ALL RESPONSES TO PHQ QUESTIONS 1-9: 0
3. TROUBLE FALLING OR STAYING ASLEEP: NOT AT ALL
5. POOR APPETITE OR OVEREATING: NOT AT ALL
SUM OF ALL RESPONSES TO PHQ QUESTIONS 1-9: 0
4. FEELING TIRED OR HAVING LITTLE ENERGY: NOT AT ALL
2. FEELING DOWN, DEPRESSED OR HOPELESS: NOT AT ALL
7. TROUBLE CONCENTRATING ON THINGS, SUCH AS READING THE NEWSPAPER OR WATCHING TELEVISION: NOT AT ALL
6. FEELING BAD ABOUT YOURSELF - OR THAT YOU ARE A FAILURE OR HAVE LET YOURSELF OR YOUR FAMILY DOWN: NOT AT ALL
7. TROUBLE CONCENTRATING ON THINGS, SUCH AS READING THE NEWSPAPER OR WATCHING TELEVISION: NOT AT ALL
1. LITTLE INTEREST OR PLEASURE IN DOING THINGS: NOT AT ALL
5. POOR APPETITE OR OVEREATING: NOT AT ALL
SUM OF ALL RESPONSES TO PHQ QUESTIONS 1-9: 0
SUM OF ALL RESPONSES TO PHQ QUESTIONS 1-9: 0
9. THOUGHTS THAT YOU WOULD BE BETTER OFF DEAD, OR OF HURTING YOURSELF: NOT AT ALL

## 2025-06-02 ASSESSMENT — ANXIETY QUESTIONNAIRES: GAD7 TOTAL SCORE: 0

## 2025-06-02 ASSESSMENT — ENCOUNTER SYMPTOMS
RHINORRHEA: 0
COUGH: 0
ABDOMINAL PAIN: 0
CHEST TIGHTNESS: 0
SHORTNESS OF BREATH: 0
BLOOD IN STOOL: 0
CONSTIPATION: 0

## 2025-06-02 NOTE — PROGRESS NOTES
Chief Complaint   Patient presents with    6 Month Follow-Up         Here for 6 month well check.        \"Have you been to the ER, urgent care clinic since your last visit?  Hospitalized since your last visit?\"    NO    “Have you seen or consulted any other health care providers outside of [facility] since your last visit?”    NO            Click Here for Release of Records Request

## 2025-06-02 NOTE — PROGRESS NOTES
Subjective:      Patient ID: Nohemi Awan is a 59 y.o. female.    HPI  Follow up on chronic medical problems.  Overall feeling well.   Cardiovascular Review:  The patient has hypertension.  Tolerating losartan   Diet and Lifestyle: generally follows a low fat low cholesterol diet, generally follows a low sodium diet, exercises sporadically  Home BP Monitorin-140s/80s  Pertinent ROS: taking medications as instructed, no medication side effects noted, no TIA's, no chest pain on exertion, no dyspnea on exertion, no swelling of ankles.  DM type II follow up:  A1c was 6.8% in 2023 visit. Was started on ozmepic but stopped medication d/t abd pain and constipation.  Has tried to change some of her eating habits to help better with her BS.   Compliant w/ diabetic diet most of the time.  Has not been doing as much exercise.     Denies any tingling sensation, polyuria and polydipsia.  No blurred vision.  No significant weight changes.    Hypercholesterolemia follow up:  Compliant w/ meds, low fat, low cholesterol diet.  Doing well on lipitor.  Exercising some.  No muscle nor abdominal pain, no skin discoloration.  Patient fasting today.  Anxiety Review:  Patient is seen for sleep disturbance and anxiety.  Current treatment includes Xanax prn and Effexor.  Effexor helping with hot flashes.    Anxiety has been improved.   No SI/HI    Ongoing symptoms include: insomnia.  She is using ambien for sleep.    Reported side effects from the treatment: none.     HM:  Mammo: b/l mastectomy  Colonoscopy: can't get d/t not having insurance for now.        Past Medical History:   Diagnosis Date    Anemia 10/3/13    \"SINCE I WAS A KID\"    Anxiety     DURING CHEMO; PT STAYS XANAX AS OF 10/3/13    Cancer ([facility]) [phone]    breast,chemo ended 10/2012    Chronic pain     si joints sacrum    Depression     Fibromyalgia     Nausea & vomiting     used patch in past with good results    Other ill-defined conditions(779.76)

## 2025-06-04 ENCOUNTER — RESULTS FOLLOW-UP (OUTPATIENT)
Age: 60
End: 2025-06-04

## 2025-08-13 ENCOUNTER — HOSPITAL ENCOUNTER (EMERGENCY)
Facility: HOSPITAL | Age: 60
Discharge: HOME | End: 2025-08-13
Attending: EMERGENCY MEDICINE
Payer: COMMERCIAL

## 2025-08-13 VITALS
HEART RATE: 64 BPM | TEMPERATURE: 98.6 F | DIASTOLIC BLOOD PRESSURE: 78 MMHG | OXYGEN SATURATION: 96 % | BODY MASS INDEX: 37.03 KG/M2 | SYSTOLIC BLOOD PRESSURE: 155 MMHG | WEIGHT: 196 LBS | RESPIRATION RATE: 17 BRPM

## 2025-08-13 DIAGNOSIS — R20.2 FACIAL PARESTHESIA: ICD-10-CM

## 2025-08-13 DIAGNOSIS — M79.602 LEFT ARM PAIN: Primary | ICD-10-CM

## 2025-08-13 DIAGNOSIS — M54.12 CERVICAL RADICULOPATHY: ICD-10-CM

## 2025-08-13 LAB
ALBUMIN SERPL-MCNC: 3.9 G/DL (ref 3.5–5.2)
ALBUMIN/GLOB SERPL: 1.4 (ref 1.1–2.2)
ALP SERPL-CCNC: 111 U/L (ref 35–104)
ALT SERPL-CCNC: 23 U/L (ref 10–35)
ANION GAP SERPL CALC-SCNC: 12 MMOL/L (ref 2–14)
AST SERPL-CCNC: 21 U/L (ref 10–35)
BASOPHILS # BLD: 0.09 K/UL (ref 0–0.1)
BASOPHILS NFR BLD: 1.3 % (ref 0–1)
BILIRUB SERPL-MCNC: 0.4 MG/DL (ref 0–1.2)
BUN SERPL-MCNC: 13 MG/DL (ref 6–20)
BUN/CREAT SERPL: 19 (ref 12–20)
CALCIUM SERPL-MCNC: 9.3 MG/DL (ref 8.6–10)
CHLORIDE SERPL-SCNC: 103 MMOL/L (ref 98–107)
CO2 SERPL-SCNC: 24 MMOL/L (ref 20–29)
COMMENT:: NORMAL
CREAT SERPL-MCNC: 0.69 MG/DL (ref 0.6–1)
DIFFERENTIAL METHOD BLD: ABNORMAL
EKG ATRIAL RATE: 64 BPM
EKG DIAGNOSIS: NORMAL
EKG P AXIS: 68 DEGREES
EKG P-R INTERVAL: 162 MS
EKG Q-T INTERVAL: 428 MS
EKG QRS DURATION: 114 MS
EKG QTC CALCULATION (BAZETT): 441 MS
EKG R AXIS: 65 DEGREES
EKG T AXIS: 77 DEGREES
EKG VENTRICULAR RATE: 64 BPM
EOSINOPHIL # BLD: 0.18 K/UL (ref 0–0.4)
EOSINOPHIL NFR BLD: 2.6 % (ref 0–7)
ERYTHROCYTE [DISTWIDTH] IN BLOOD BY AUTOMATED COUNT: 13.9 % (ref 11.5–14.5)
GLOBULIN SER CALC-MCNC: 2.8 G/DL (ref 2–4)
GLUCOSE SERPL-MCNC: 117 MG/DL (ref 65–100)
HCT VFR BLD AUTO: 37.1 % (ref 35–47)
HGB BLD-MCNC: 11.6 G/DL (ref 11.5–16)
IMM GRANULOCYTES # BLD AUTO: 0.03 K/UL (ref 0–0.04)
IMM GRANULOCYTES NFR BLD AUTO: 0.4 % (ref 0–0.5)
LYMPHOCYTES # BLD: 2.18 K/UL (ref 0.8–3.5)
LYMPHOCYTES NFR BLD: 31 % (ref 12–49)
MCH RBC QN AUTO: 25.9 PG (ref 26–34)
MCHC RBC AUTO-ENTMCNC: 31.3 G/DL (ref 30–36.5)
MCV RBC AUTO: 82.8 FL (ref 80–99)
MONOCYTES # BLD: 0.45 K/UL (ref 0–1)
MONOCYTES NFR BLD: 6.4 % (ref 5–13)
NEUTS SEG # BLD: 4.1 K/UL (ref 1.8–8)
NEUTS SEG NFR BLD: 58.3 % (ref 32–75)
NRBC # BLD: 0 K/UL (ref 0–0.01)
NRBC BLD-RTO: 0 PER 100 WBC
PLATELET # BLD AUTO: 274 K/UL (ref 150–400)
PMV BLD AUTO: 12 FL (ref 8.9–12.9)
POTASSIUM SERPL-SCNC: 4.7 MMOL/L (ref 3.5–5.1)
PROT SERPL-MCNC: 6.6 G/DL (ref 6.4–8.3)
RBC # BLD AUTO: 4.48 M/UL (ref 3.8–5.2)
SODIUM SERPL-SCNC: 138 MMOL/L (ref 136–145)
SPECIMEN HOLD: NORMAL
TROPONIN T SERPL HS-MCNC: <6 NG/L (ref 0–14)
WBC # BLD AUTO: 7 K/UL (ref 3.6–11)

## 2025-08-13 PROCEDURE — 99284 EMERGENCY DEPT VISIT MOD MDM: CPT

## 2025-08-13 PROCEDURE — 96374 THER/PROPH/DIAG INJ IV PUSH: CPT

## 2025-08-13 PROCEDURE — 36415 COLL VENOUS BLD VENIPUNCTURE: CPT

## 2025-08-13 PROCEDURE — 85025 COMPLETE CBC W/AUTO DIFF WBC: CPT

## 2025-08-13 PROCEDURE — 6360000002 HC RX W HCPCS: Performed by: EMERGENCY MEDICINE

## 2025-08-13 PROCEDURE — 93005 ELECTROCARDIOGRAM TRACING: CPT | Performed by: STUDENT IN AN ORGANIZED HEALTH CARE EDUCATION/TRAINING PROGRAM

## 2025-08-13 PROCEDURE — 80053 COMPREHEN METABOLIC PANEL: CPT

## 2025-08-13 PROCEDURE — 84484 ASSAY OF TROPONIN QUANT: CPT

## 2025-08-13 RX ORDER — KETOROLAC TROMETHAMINE 30 MG/ML
30 INJECTION, SOLUTION INTRAMUSCULAR; INTRAVENOUS
Status: COMPLETED | OUTPATIENT
Start: 2025-08-13 | End: 2025-08-13

## 2025-08-13 RX ORDER — NAPROXEN 500 MG/1
500 TABLET ORAL 2 TIMES DAILY WITH MEALS
Qty: 20 TABLET | Refills: 0 | Status: SHIPPED | OUTPATIENT
Start: 2025-08-13 | End: 2025-08-23

## 2025-08-13 RX ADMIN — KETOROLAC TROMETHAMINE 30 MG: 30 INJECTION, SOLUTION INTRAMUSCULAR at 12:41

## 2025-08-13 ASSESSMENT — PAIN - FUNCTIONAL ASSESSMENT
PAIN_FUNCTIONAL_ASSESSMENT: ACTIVITIES ARE NOT PREVENTED
PAIN_FUNCTIONAL_ASSESSMENT: 0-10
PAIN_FUNCTIONAL_ASSESSMENT: 0-10

## 2025-08-13 ASSESSMENT — PAIN DESCRIPTION - DESCRIPTORS: DESCRIPTORS: ACHING

## 2025-08-13 ASSESSMENT — PAIN DESCRIPTION - ORIENTATION
ORIENTATION: LEFT;POSTERIOR
ORIENTATION: LEFT

## 2025-08-13 ASSESSMENT — PAIN DESCRIPTION - PAIN TYPE: TYPE: ACUTE PAIN

## 2025-08-13 ASSESSMENT — PAIN DESCRIPTION - FREQUENCY: FREQUENCY: CONTINUOUS

## 2025-08-13 ASSESSMENT — PAIN SCALES - GENERAL
PAINLEVEL_OUTOF10: 1
PAINLEVEL_OUTOF10: 2

## 2025-08-13 ASSESSMENT — PAIN DESCRIPTION - ONSET: ONSET: ON-GOING

## 2025-08-13 ASSESSMENT — PAIN DESCRIPTION - LOCATION
LOCATION: ARM
LOCATION: ARM

## 2025-08-21 DIAGNOSIS — K21.9 GASTRO-ESOPHAGEAL REFLUX DISEASE WITHOUT ESOPHAGITIS: ICD-10-CM

## 2025-08-22 RX ORDER — PANTOPRAZOLE SODIUM 40 MG/1
40 TABLET, DELAYED RELEASE ORAL DAILY
Qty: 90 TABLET | Refills: 3 | OUTPATIENT
Start: 2025-08-22